# Patient Record
Sex: MALE | Race: BLACK OR AFRICAN AMERICAN | NOT HISPANIC OR LATINO | Employment: UNEMPLOYED | URBAN - METROPOLITAN AREA
[De-identification: names, ages, dates, MRNs, and addresses within clinical notes are randomized per-mention and may not be internally consistent; named-entity substitution may affect disease eponyms.]

---

## 2017-01-25 ENCOUNTER — TRANSCRIBE ORDERS (OUTPATIENT)
Dept: LAB | Facility: HOSPITAL | Age: 62
End: 2017-01-25

## 2017-02-07 ENCOUNTER — OFFICE VISIT (OUTPATIENT)
Dept: FAMILY MEDICINE CLINIC | Facility: CLINIC | Age: 62
End: 2017-02-07

## 2017-02-07 VITALS
HEART RATE: 71 BPM | SYSTOLIC BLOOD PRESSURE: 110 MMHG | TEMPERATURE: 98 F | BODY MASS INDEX: 26.22 KG/M2 | WEIGHT: 177 LBS | OXYGEN SATURATION: 98 % | HEIGHT: 69 IN | DIASTOLIC BLOOD PRESSURE: 60 MMHG

## 2017-02-07 DIAGNOSIS — J00 ACUTE NASOPHARYNGITIS: Primary | ICD-10-CM

## 2017-02-07 PROBLEM — R42 VERTIGO: Status: ACTIVE | Noted: 2017-02-07

## 2017-02-07 PROBLEM — N41.0 ACUTE PROSTATITIS: Status: ACTIVE | Noted: 2017-02-07

## 2017-02-07 PROBLEM — F52.21 ERECTILE DYSFUNCTION OF NONORGANIC ORIGIN: Status: ACTIVE | Noted: 2017-02-07

## 2017-02-07 PROBLEM — R07.9 CHEST PAIN: Status: ACTIVE | Noted: 2017-02-07

## 2017-02-07 PROBLEM — R13.10 DYSPHAGIA: Status: ACTIVE | Noted: 2017-02-07

## 2017-02-07 PROBLEM — I10 HYPERTENSION: Status: ACTIVE | Noted: 2017-02-07

## 2017-02-07 PROBLEM — M54.50 LOW BACK PAIN: Status: ACTIVE | Noted: 2017-02-07

## 2017-02-07 PROBLEM — H81.10 BENIGN PAROXYSMAL POSITIONAL VERTIGO: Status: ACTIVE | Noted: 2017-02-07

## 2017-02-07 PROBLEM — K21.9 GASTROESOPHAGEAL REFLUX DISEASE: Status: ACTIVE | Noted: 2017-02-07

## 2017-02-07 PROBLEM — H83.09 LABYRINTHITIS: Status: ACTIVE | Noted: 2017-02-07

## 2017-02-07 PROBLEM — B19.20 HEPATITIS C VIRUS INFECTION: Status: ACTIVE | Noted: 2017-02-07

## 2017-02-07 PROBLEM — R94.31 ABNORMAL ELECTROCARDIOGRAM: Status: ACTIVE | Noted: 2017-02-07

## 2017-02-07 PROBLEM — N40.0 BENIGN PROSTATIC HYPERTROPHY WITHOUT URINARY OBSTRUCTION: Status: ACTIVE | Noted: 2017-02-07

## 2017-02-07 LAB
EXPIRATION DATE: NORMAL
FLUAV AG NPH QL: NORMAL
FLUBV AG NPH QL: NORMAL
INTERNAL CONTROL: NORMAL
Lab: NORMAL

## 2017-02-07 PROCEDURE — 87804 INFLUENZA ASSAY W/OPTIC: CPT | Performed by: NURSE PRACTITIONER

## 2017-02-07 PROCEDURE — 99213 OFFICE O/P EST LOW 20 MIN: CPT | Performed by: NURSE PRACTITIONER

## 2017-02-07 RX ORDER — AZITHROMYCIN 250 MG/1
TABLET, FILM COATED ORAL
Qty: 6 TABLET | Refills: 0 | Status: SHIPPED | OUTPATIENT
Start: 2017-02-07 | End: 2017-03-08

## 2017-02-07 NOTE — PROGRESS NOTES
Subjective   Mp Wen is a 61 y.o. male.     History of Present Illness Pt presents with two days history of sinus drainage and fever 100.8, body aches, sneezing  and chills . Denies cough, congestion, SOB, abdominal pain, or chest pain. Pt states people at work and at [employer] sick. Pt took sinus medication with no relief. Pt had the flu vacc in Nov 2016.     The following portions of the patient's history were reviewed and updated as appropriate: allergies, current medications, past family history, past medical history, past social history, past surgical history and problem list.    Review of Systems   Constitutional: Positive for chills and fever. Negative for fatigue and unexpected weight change.   HENT: Positive for rhinorrhea and sneezing. Negative for congestion, hearing loss, nosebleeds, sore throat, trouble swallowing and voice change.    Eyes: Positive for discharge. Negative for pain, redness and visual disturbance.   Respiratory: Negative for cough, chest tightness, shortness of breath and wheezing.    Cardiovascular: Negative for chest pain, palpitations and leg swelling.   Gastrointestinal: Negative for abdominal distention, abdominal pain, anal bleeding, blood in stool, constipation, diarrhea, nausea and vomiting.   Endocrine: Negative for cold intolerance, heat intolerance, polydipsia, polyphagia and polyuria.   Genitourinary: Negative for dysuria, flank pain, frequency and hematuria.   Musculoskeletal: Positive for myalgias. Negative for arthralgias, gait problem and joint swelling.   Skin: Negative for color change and rash.   Neurological: Positive for headaches. Negative for dizziness, tremors, seizures, syncope, speech difficulty, weakness and numbness.   Hematological: Negative.    Psychiatric/Behavioral: Negative.        Objective   Physical Exam   Constitutional: He is oriented to person, place, and time. He appears well-developed and well-nourished. No distress.   HENT:   Head:  Normocephalic and atraumatic.   Right Ear: External ear normal.   Left Ear: External ear normal.   Nose: Nose normal.   Mouth/Throat: Oropharynx is clear and moist. No oropharyngeal exudate.   Eyes: Conjunctivae are normal. Right eye exhibits no discharge. Left eye exhibits no discharge. No scleral icterus.   Neck: Normal range of motion. Neck supple.   Cardiovascular: Normal rate, regular rhythm and normal heart sounds.  Exam reveals no gallop and no friction rub.    No murmur heard.  Pulmonary/Chest: Effort normal and breath sounds normal. No respiratory distress. He has no wheezes. He has no rales.   Musculoskeletal: Normal range of motion. He exhibits no edema or deformity.   Lymphadenopathy:     He has no cervical adenopathy.   Neurological: He is alert and oriented to person, place, and time. He displays normal reflexes. No cranial nerve deficit. Coordination normal.   Skin: Skin is warm and dry. No rash noted.   Psychiatric: He has a normal mood and affect. His behavior is normal. Judgment and thought content normal.   Vitals reviewed.      Assessment/Plan   Mp was seen today for flu symptoms.    Diagnoses and all orders for this visit:    Acute nasopharyngitis  -     azithromycin (ZITHROMAX Z-PIERRE) 250 MG tablet; Take 2 tablets the first day, then 1 tablet daily for 4 days.  -     POC Influenza A / B      Symptomatic treatment. Try OTC antihistamine like Claritin or Zyrtec. Follow up symptoms persist or worsen.

## 2017-03-08 ENCOUNTER — OFFICE VISIT (OUTPATIENT)
Dept: FAMILY MEDICINE CLINIC | Facility: CLINIC | Age: 62
End: 2017-03-08

## 2017-03-08 VITALS
HEIGHT: 69 IN | SYSTOLIC BLOOD PRESSURE: 122 MMHG | HEART RATE: 109 BPM | WEIGHT: 180 LBS | BODY MASS INDEX: 26.66 KG/M2 | OXYGEN SATURATION: 95 % | TEMPERATURE: 100.9 F | DIASTOLIC BLOOD PRESSURE: 80 MMHG

## 2017-03-08 DIAGNOSIS — R60.0 EDEMA OF LEFT LOWER EXTREMITY: ICD-10-CM

## 2017-03-08 DIAGNOSIS — M79.605 LEG PAIN, LEFT: Primary | ICD-10-CM

## 2017-03-08 LAB
BASOPHILS # BLD AUTO: 0.03 10*3/MM3 (ref 0–0.2)
BASOPHILS NFR BLD AUTO: 0.3 % (ref 0–1)
DEPRECATED RDW RBC AUTO: 45.6 FL (ref 37–54)
EOSINOPHIL # BLD AUTO: 0.05 10*3/MM3 (ref 0.1–0.3)
EOSINOPHIL NFR BLD AUTO: 0.5 % (ref 0–3)
ERYTHROCYTE [DISTWIDTH] IN BLOOD BY AUTOMATED COUNT: 13.6 % (ref 11.3–14.5)
HCT VFR BLD AUTO: 40.5 % (ref 38.9–50.9)
HGB BLD-MCNC: 13.5 G/DL (ref 13.1–17.5)
IMM GRANULOCYTES # BLD: 0.02 10*3/MM3 (ref 0–0.03)
IMM GRANULOCYTES NFR BLD: 0.2 % (ref 0–0.6)
LYMPHOCYTES # BLD AUTO: 1.09 10*3/MM3 (ref 0.6–4.8)
LYMPHOCYTES NFR BLD AUTO: 10.6 % (ref 24–44)
MCH RBC QN AUTO: 30.5 PG (ref 27–31)
MCHC RBC AUTO-ENTMCNC: 33.3 G/DL (ref 32–36)
MCV RBC AUTO: 91.4 FL (ref 80–99)
MONOCYTES # BLD AUTO: 0.95 10*3/MM3 (ref 0–1)
MONOCYTES NFR BLD AUTO: 9.3 % (ref 0–12)
NEUTROPHILS # BLD AUTO: 8.12 10*3/MM3 (ref 1.5–8.3)
NEUTROPHILS NFR BLD AUTO: 79.1 % (ref 41–71)
PLATELET # BLD AUTO: 198 10*3/MM3 (ref 150–450)
PMV BLD AUTO: 10.7 FL (ref 6–12)
RBC # BLD AUTO: 4.43 10*6/MM3 (ref 4.2–5.76)
URATE SERPL-MCNC: 6.6 MG/DL (ref 3.7–9.2)
WBC NRBC COR # BLD: 10.26 10*3/MM3 (ref 3.5–10.8)

## 2017-03-08 PROCEDURE — 84550 ASSAY OF BLOOD/URIC ACID: CPT | Performed by: FAMILY MEDICINE

## 2017-03-08 PROCEDURE — 85025 COMPLETE CBC W/AUTO DIFF WBC: CPT | Performed by: FAMILY MEDICINE

## 2017-03-08 PROCEDURE — 36415 COLL VENOUS BLD VENIPUNCTURE: CPT | Performed by: FAMILY MEDICINE

## 2017-03-08 PROCEDURE — 99213 OFFICE O/P EST LOW 20 MIN: CPT | Performed by: FAMILY MEDICINE

## 2017-03-08 RX ORDER — AMOXICILLIN AND CLAVULANATE POTASSIUM 875; 125 MG/1; MG/1
1 TABLET, FILM COATED ORAL 2 TIMES DAILY
Qty: 14 TABLET | Refills: 0 | Status: SHIPPED | OUTPATIENT
Start: 2017-03-08 | End: 2017-03-10

## 2017-03-08 NOTE — PROGRESS NOTES
"Subjective   Mp Wen is a 62 y.o. male.     History of Present Illness   The patient presents today with severe pain and swelling of his left foot.    He states today he also noticed a knot/lump in the left upper thigh/groin.  No known injury.    Also temperature today of 101.9 at home and 100.9 here in the office on recheck.    Prior hx of MRSA infection of right leg.  Also with prior hx of hepatitis C, treated with harvoni.      The following portions of the patient's history were reviewed and updated as appropriate: allergies, current medications, past social history and problem list.    Review of Systems   Musculoskeletal:        Left foot pain and swelling.  And left groin pain with painful knot,         Objective   Visit Vitals   • /80   • Pulse 109   • Temp (!) 100.9 °F (38.3 °C)   • Ht 69\" (175.3 cm)   • Wt 180 lb (81.6 kg)   • SpO2 95%   • BMI 26.58 kg/m2     Physical Exam   Constitutional: He appears well-developed and well-nourished.   Cardiovascular:   No murmur heard.  Pulmonary/Chest: He has no wheezes. He has no rales.   Musculoskeletal: He exhibits edema.   Tender at dorsum of left foot.  Ankle and toes nontender.    Small, mildly tender node at left groin.    Left calf and shin with 1 plus edema.  Mild tenderness of left calf.   Skin:   Small area of erythema at center of dorsum of left foot.  No induration. No vesicles.   Nursing note and vitals reviewed.      Assessment/Plan   Problem List Items Addressed This Visit     None      Visit Diagnoses     Leg pain, left    -  Primary    Edema of left lower extremity                  Rest and prop foot up as much as possible.    Rx for Augmentin 875 mg twice a day for 7 days.    Sample for Xarelto 15 mg to take one tablet twice a day for 2 days.    Check a venous duplex scan of the left leg.  Check a CBC and Uric acid level.    Follow up on Friday.      Scribed for Dr Denny Howard by Edith Javed CMA.    I, Denny Howard MD, personally " performed the services described in this documentation, as scribed by Edith Javed in my presence, and is both accurate and complete.

## 2017-03-10 ENCOUNTER — OFFICE VISIT (OUTPATIENT)
Dept: FAMILY MEDICINE CLINIC | Facility: CLINIC | Age: 62
End: 2017-03-10

## 2017-03-10 VITALS
TEMPERATURE: 98.6 F | WEIGHT: 175 LBS | HEIGHT: 69 IN | HEART RATE: 104 BPM | BODY MASS INDEX: 25.92 KG/M2 | SYSTOLIC BLOOD PRESSURE: 110 MMHG | OXYGEN SATURATION: 97 % | DIASTOLIC BLOOD PRESSURE: 78 MMHG

## 2017-03-10 DIAGNOSIS — L03.116 CELLULITIS OF LEFT FOOT: Primary | ICD-10-CM

## 2017-03-10 PROCEDURE — 96372 THER/PROPH/DIAG INJ SC/IM: CPT | Performed by: FAMILY MEDICINE

## 2017-03-10 PROCEDURE — 99214 OFFICE O/P EST MOD 30 MIN: CPT | Performed by: FAMILY MEDICINE

## 2017-03-10 RX ORDER — CEFTRIAXONE 500 MG/1
500 INJECTION, POWDER, FOR SOLUTION INTRAMUSCULAR; INTRAVENOUS EVERY 24 HOURS
Status: DISCONTINUED | OUTPATIENT
Start: 2017-03-10 | End: 2017-03-13

## 2017-03-10 RX ORDER — SULFAMETHOXAZOLE AND TRIMETHOPRIM 800; 160 MG/1; MG/1
1 TABLET ORAL 2 TIMES DAILY
Qty: 14 TABLET | Refills: 0 | Status: SHIPPED | OUTPATIENT
Start: 2017-03-10 | End: 2017-03-16 | Stop reason: SDUPTHER

## 2017-03-10 RX ADMIN — CEFTRIAXONE 500 MG: 500 INJECTION, POWDER, FOR SOLUTION INTRAMUSCULAR; INTRAVENOUS at 15:19

## 2017-03-10 NOTE — PROGRESS NOTES
"Subjective   Mp Wen is a 62 y.o. male.     History of Present Illness   The patient is here for a follow up on left foot and leg pain and swelling. Venous duplex scan of the left lower extremity revealed no deep vein thrombosis. Laboratory studies were unremarkable.    He states the pain and swelling is worse and now having more redness.    States he is unable to bear weight on it.    The following portions of the patient's history were reviewed and updated as appropriate: allergies, current medications, past social history and problem list.    Review of Systems   Constitutional: Positive for chills and fever.   HENT: Negative for congestion.    Eyes: Negative for visual disturbance.   Respiratory: Negative for cough and shortness of breath.    Cardiovascular: Negative for chest pain, palpitations and leg swelling.   Gastrointestinal: Negative for abdominal pain.   Musculoskeletal: Positive for gait problem. Negative for back pain.   Skin: Positive for color change.   Hematological: Does not bruise/bleed easily.       Objective   Visit Vitals   • /78   • Pulse 104   • Temp 98.6 °F (37 °C)   • Ht 69\" (175.3 cm)   • Wt 175 lb (79.4 kg)   • SpO2 97%   • BMI 25.84 kg/m2     Physical Exam   Constitutional: He appears well-developed and well-nourished.   Eyes: Pupils are equal, round, and reactive to light.   Cardiovascular: Normal rate, regular rhythm and normal heart sounds.    Pulmonary/Chest: Effort normal and breath sounds normal. He has no wheezes. He has no rales.   Skin: Rash noted. There is erythema.   Examination of the left foot reveals a.m. wide distribution of erythema and tenderness of the skin at the dorsum of the foot. There is no involvement of the sole. The ankle joint is nontender and the distal aspect of the toes themselves are nontender. The central portion of the mid foot, however, is very states tender at its midportion. The patient has pain with weightbearing. There is no tenderness of " the ankle and none of the knee. There is a small node palpable. The left groin which is tender.   Nursing note and vitals reviewed.      Assessment/Plan   Problem List Items Addressed This Visit     None      Visit Diagnoses     Cellulitis of left foot    -  Primary              Prop foot up as much as possible.    Stop the Augmentin. Stop the Xarelto.    Rx for Bactrim DS twice a day for 7 days.  Rx for Dicloxacillin 500 mg 4 times a day #28+0.    Rx for Hydrocodone 7.5/325 mg 4 times a day as needed #20+0.    Injection of Ceftriaxone 500 mg IM today.    Refer to Infectious disease.    Follow up on Monday    Go to the ER if foot swelling and redness worsens.      Scribed for Dr Denny Howard by Edith Javed CMA.    I, Denny Howard MD, personally performed the services described in this documentation, as scribed by Edith Javed in my presence, and is both accurate and complete.

## 2017-03-13 ENCOUNTER — OFFICE VISIT (OUTPATIENT)
Dept: FAMILY MEDICINE CLINIC | Facility: CLINIC | Age: 62
End: 2017-03-13

## 2017-03-13 ENCOUNTER — HOSPITAL ENCOUNTER (OUTPATIENT)
Dept: GENERAL RADIOLOGY | Facility: HOSPITAL | Age: 62
Discharge: HOME | End: 2017-03-13
Attending: FAMILY MEDICINE | Admitting: FAMILY MEDICINE

## 2017-03-13 VITALS
SYSTOLIC BLOOD PRESSURE: 102 MMHG | HEART RATE: 82 BPM | TEMPERATURE: 98.2 F | DIASTOLIC BLOOD PRESSURE: 68 MMHG | BODY MASS INDEX: 26.07 KG/M2 | OXYGEN SATURATION: 98 % | WEIGHT: 176 LBS | HEIGHT: 69 IN

## 2017-03-13 DIAGNOSIS — L03.119 CELLULITIS OF FOOT: Primary | ICD-10-CM

## 2017-03-13 DIAGNOSIS — L03.119 CELLULITIS OF FOOT: ICD-10-CM

## 2017-03-13 PROBLEM — H81.10 BENIGN PAROXYSMAL POSITIONAL VERTIGO: Status: RESOLVED | Noted: 2017-02-07 | Resolved: 2017-03-13

## 2017-03-13 PROBLEM — R42 VERTIGO: Status: RESOLVED | Noted: 2017-02-07 | Resolved: 2017-03-13

## 2017-03-13 PROBLEM — R07.9 CHEST PAIN: Status: RESOLVED | Noted: 2017-02-07 | Resolved: 2017-03-13

## 2017-03-13 PROBLEM — N41.0 ACUTE PROSTATITIS: Status: RESOLVED | Noted: 2017-02-07 | Resolved: 2017-03-13

## 2017-03-13 PROBLEM — H83.09 LABYRINTHITIS: Status: RESOLVED | Noted: 2017-02-07 | Resolved: 2017-03-13

## 2017-03-13 PROCEDURE — 99213 OFFICE O/P EST LOW 20 MIN: CPT | Performed by: FAMILY MEDICINE

## 2017-03-13 PROCEDURE — 73630 X-RAY EXAM OF FOOT: CPT

## 2017-03-13 NOTE — PROGRESS NOTES
"Subjective   Mp Wen is a 62 y.o. male.     History of Present Illness   The patient is here for a follow up on cellulitis of his left foot.    He states he is doing better.  Still sore but not as bad. Able to put weight on foot now.        The following portions of the patient's history were reviewed and updated as appropriate: allergies, current medications, past social history and problem list.    Review of Systems   Constitutional: Positive for appetite change (decreased).   Gastrointestinal: Positive for nausea.   Musculoskeletal:        Pain of left foot         Objective   Visit Vitals   • /68   • Pulse 82   • Temp 98.2 °F (36.8 °C)   • Ht 69\" (175.3 cm)   • Wt 176 lb (79.8 kg)   • SpO2 98%   • BMI 25.99 kg/m2     Physical Exam   Constitutional: He appears well-developed and well-nourished.   Cardiovascular: Normal rate, regular rhythm and normal heart sounds.    Pulmonary/Chest: Effort normal and breath sounds normal.   Skin:   The area of redness at the dorsum of the foot has diminished in distribution and the tenderness is decreased in intensity. There is a small area centrally at the dorsum of the foot that is still mildly tender.   Nursing note and vitals reviewed.      Assessment/Plan   Problem List Items Addressed This Visit     None      Visit Diagnoses     Cellulitis of foot    -  Primary    left                Finish out the Dicloxacillin and Bactrim.    Use Yogurt or cottage cheese once or twice a day or use OTC probiotic.    Check an x-ray of the left foot.    Follow up on Thursday.    Scribed for Dr Denny Howard by Edith Javed CMA.    I, Denny Howard MD, personally performed the services described in this documentation, as scribed by Edith Javed in my presence, and is both accurate and complete.        "

## 2017-03-16 ENCOUNTER — OFFICE VISIT (OUTPATIENT)
Dept: FAMILY MEDICINE CLINIC | Facility: CLINIC | Age: 62
End: 2017-03-16

## 2017-03-16 VITALS
DIASTOLIC BLOOD PRESSURE: 70 MMHG | SYSTOLIC BLOOD PRESSURE: 108 MMHG | WEIGHT: 179 LBS | BODY MASS INDEX: 26.51 KG/M2 | OXYGEN SATURATION: 96 % | HEIGHT: 69 IN | TEMPERATURE: 97.6 F | HEART RATE: 71 BPM

## 2017-03-16 DIAGNOSIS — L03.116 CELLULITIS OF LEFT FOOT: ICD-10-CM

## 2017-03-16 PROCEDURE — 99213 OFFICE O/P EST LOW 20 MIN: CPT | Performed by: FAMILY MEDICINE

## 2017-03-16 RX ORDER — SULFAMETHOXAZOLE AND TRIMETHOPRIM 800; 160 MG/1; MG/1
1 TABLET ORAL 2 TIMES DAILY
Qty: 14 TABLET | Refills: 0 | Status: SHIPPED | OUTPATIENT
Start: 2017-03-16 | End: 2017-08-23

## 2017-03-16 NOTE — PROGRESS NOTES
"Subjective   Mp Wen is a 62 y.o. male.     History of Present Illness    The patient is here for a follow up on  left foot.    He states he is doing much better  Still some redness and tenderness but much improved.          The following portions of the patient's history were reviewed and updated as appropriate: allergies, current medications, past social history and problem list.    Review of Systems   Gastrointestinal: Positive for diarrhea (occasional).   Musculoskeletal:        Left foot pain         Objective   Visit Vitals   • /70   • Pulse 71   • Temp 97.6 °F (36.4 °C)   • Ht 69\" (175.3 cm)   • Wt 179 lb (81.2 kg)   • SpO2 96%   • BMI 26.43 kg/m2     Physical Exam   Constitutional: He appears well-developed and well-nourished.   Cardiovascular: Normal rate, regular rhythm and normal heart sounds.    Pulmonary/Chest: Effort normal and breath sounds normal.   Skin:   Exam of foot reveals no erythema and no tenderness.   Nursing note and vitals reviewed.      Assessment/Plan   Problem List Items Addressed This Visit     None      Visit Diagnoses     Cellulitis of left foot                  Drink plenty fluids.    Rx for 7 more days on Bactrim DS #14+0.    Follow up as needed.    Scribed for Dr Denny Howard by Edith Javed CMA.    I, Denny Howard MD, personally performed the services described in this documentation, as scribed by Edith Javed in my presence, and is both accurate and complete.    "

## 2017-04-25 RX ORDER — LISINOPRIL 20 MG/1
TABLET ORAL
Qty: 90 TABLET | Refills: 0 | Status: SHIPPED | OUTPATIENT
Start: 2017-04-25 | End: 2017-07-26 | Stop reason: SDUPTHER

## 2017-07-26 RX ORDER — LISINOPRIL 20 MG/1
TABLET ORAL
Qty: 90 TABLET | Refills: 0 | Status: SHIPPED | OUTPATIENT
Start: 2017-07-26 | End: 2017-10-21 | Stop reason: SDUPTHER

## 2017-08-23 ENCOUNTER — OFFICE VISIT (OUTPATIENT)
Dept: FAMILY MEDICINE CLINIC | Facility: CLINIC | Age: 62
End: 2017-08-23

## 2017-08-23 VITALS
HEART RATE: 85 BPM | BODY MASS INDEX: 26.22 KG/M2 | HEIGHT: 69 IN | WEIGHT: 177 LBS | SYSTOLIC BLOOD PRESSURE: 128 MMHG | DIASTOLIC BLOOD PRESSURE: 70 MMHG | OXYGEN SATURATION: 95 % | TEMPERATURE: 99.8 F

## 2017-08-23 DIAGNOSIS — K76.0 HEPATIC STEATOSIS: Primary | ICD-10-CM

## 2017-08-23 PROCEDURE — 99213 OFFICE O/P EST LOW 20 MIN: CPT | Performed by: FAMILY MEDICINE

## 2017-08-23 NOTE — PROGRESS NOTES
"Subjective   Mp Wen is a 62 y.o. male.     History of Present Illness   The patient is here for a follow up on episodic vertigo and fill out FMLA forms.  Also a follow up on elevated liver function studies.    States he saw Dr Guzmán two weeks ago and she did repeat lab studies.    States he is doing well.  Denies any chest pain or shortness of breath.    The following portions of the patient's history were reviewed and updated as appropriate: allergies, current medications, past social history and problem list.    Review of Systems    Objective   /70  Pulse 85  Temp 99.8 °F (37.7 °C)  Ht 69\" (175.3 cm)  Wt 177 lb (80.3 kg)  SpO2 95%  BMI 26.14 kg/m2  Physical Exam   Constitutional: He appears well-developed and well-nourished.   Cardiovascular: Normal rate, regular rhythm and normal heart sounds.    Pulmonary/Chest: Effort normal and breath sounds normal. He has no wheezes. He has no rales.   Abdominal: Soft. Bowel sounds are normal. He exhibits no distension and no mass. There is no tenderness.   Nursing note and vitals reviewed.      Assessment/Plan   Problem List Items Addressed This Visit     None      Visit Diagnoses     Hepatic steatosis    -  Primary              Drink plenty fluids.    Continue medications as doing.    Follow up in 5 months.  Sooner if needed.                  Scribed for Dr Denny Howard by Edith Javed CMA.          I, Denny Howard MD, personally performed the services described in this documentation, as scribed by Edith Javed in my presence, and is both accurate and complete.      "

## 2017-10-23 RX ORDER — LISINOPRIL 20 MG/1
TABLET ORAL
Qty: 90 TABLET | Refills: 0 | Status: SHIPPED | OUTPATIENT
Start: 2017-10-23 | End: 2018-02-21 | Stop reason: SDUPTHER

## 2017-10-30 RX ORDER — LISINOPRIL 20 MG/1
TABLET ORAL
Qty: 90 TABLET | Refills: 0 | Status: SHIPPED | OUTPATIENT
Start: 2017-10-30 | End: 2018-05-05 | Stop reason: SDUPTHER

## 2018-01-22 ENCOUNTER — TELEPHONE (OUTPATIENT)
Dept: FAMILY MEDICINE CLINIC | Facility: CLINIC | Age: 63
End: 2018-01-22

## 2018-01-22 RX ORDER — SILDENAFIL 100 MG/1
50-100 TABLET, FILM COATED ORAL AS NEEDED
Qty: 10 TABLET | Refills: 5 | Status: SHIPPED | OUTPATIENT
Start: 2018-01-22 | End: 2018-02-21 | Stop reason: SDUPTHER

## 2018-02-21 ENCOUNTER — OFFICE VISIT (OUTPATIENT)
Dept: FAMILY MEDICINE CLINIC | Facility: CLINIC | Age: 63
End: 2018-02-21

## 2018-02-21 VITALS
HEIGHT: 69 IN | BODY MASS INDEX: 27.4 KG/M2 | OXYGEN SATURATION: 98 % | WEIGHT: 185 LBS | RESPIRATION RATE: 16 BRPM | HEART RATE: 89 BPM | TEMPERATURE: 98.6 F | SYSTOLIC BLOOD PRESSURE: 120 MMHG | DIASTOLIC BLOOD PRESSURE: 80 MMHG

## 2018-02-21 DIAGNOSIS — Z12.5 PROSTATE CANCER SCREENING: ICD-10-CM

## 2018-02-21 DIAGNOSIS — M54.40 CHRONIC LOW BACK PAIN WITH SCIATICA, SCIATICA LATERALITY UNSPECIFIED, UNSPECIFIED BACK PAIN LATERALITY: ICD-10-CM

## 2018-02-21 DIAGNOSIS — G89.29 CHRONIC LOW BACK PAIN WITH SCIATICA, SCIATICA LATERALITY UNSPECIFIED, UNSPECIFIED BACK PAIN LATERALITY: ICD-10-CM

## 2018-02-21 DIAGNOSIS — M79.604 PAIN OF RIGHT LOWER EXTREMITY: ICD-10-CM

## 2018-02-21 DIAGNOSIS — Z23 IMMUNIZATION DUE: ICD-10-CM

## 2018-02-21 DIAGNOSIS — Z00.00 HEALTH CARE MAINTENANCE: Primary | ICD-10-CM

## 2018-02-21 PROCEDURE — 90715 TDAP VACCINE 7 YRS/> IM: CPT | Performed by: NURSE PRACTITIONER

## 2018-02-21 PROCEDURE — 90471 IMMUNIZATION ADMIN: CPT | Performed by: NURSE PRACTITIONER

## 2018-02-21 PROCEDURE — 99396 PREV VISIT EST AGE 40-64: CPT | Performed by: NURSE PRACTITIONER

## 2018-02-21 RX ORDER — CYCLOBENZAPRINE HCL 10 MG
10 TABLET ORAL 3 TIMES DAILY PRN
Qty: 60 TABLET | Refills: 1 | Status: SHIPPED | OUTPATIENT
Start: 2018-02-21 | End: 2018-05-23

## 2018-02-21 NOTE — PROGRESS NOTES
Patient is here for annual wellness exam.    Concerns today complains of RLE pain off and on for 3 months. Pain is daily and feels deep below the muscle. It is not positional. Seems to be worse at night. Pain is throbbing in nature and can last up to 30 min at a time. Treated with topical creams with lidocaine without relief. No known injury and new shoes did not help. Pain can be in right lateral calf and also into thigh and right buttock. Pain is not in all three locations at once. It is worse with the weather.    Last health maintenance visit was 4 years . Overall they feel their health is good . 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 Patient's diet is likes to snack but has a varied diet. Exercises regularly not at all . Tobacco use Former smoker. Alcohol use is none . Illicit drug no history of illicit drug use     Screening Tests  Vision Impairment. Uses Glasses/Contacts and Eye Exam Up To Date   Hearingnormal  Dental: Brushes does teeth twice a day . Dental exam every six months?no Has full dentures  Colonoscopy yes  Prostate Exam yes          Immunization History  Tdap? yes  HPV? not applicable  Pneumonia? no  Shingles? yes    The following portions of the patient's history were reviewed and updated as appropriate: allergies, current medications, past family history, past medical history, past social history, past surgical history and problem list.    Past Medical History:   Diagnosis Date   • Abnormal EKG    • Acute bacterial prostatitis    • Benign prostatic hypertrophy    • ED (erectile dysfunction) of non-organic origin    • Esophageal reflux    • Hepatitis C    • Hypertension    • Labyrinthitis    • Retinal detachment    • Retinal detachment    • Vertigo        Family History   Problem Relation Age of Onset   • Coronary artery disease Mother    • Prostate cancer Father    • No Known Problems Paternal Grandmother        Past Surgical History:   Procedure Laterality Date   • HERNIA REPAIR          Social History     Social History   • Marital status:      Spouse name: N/A   • Number of children: N/A   • Years of education: N/A     Occupational History   • [employer]       Social History Main Topics   • Smoking status: Former Smoker     Types: Cigarettes   • Smokeless tobacco: Never Used   • Alcohol use No   • Drug use: No   • Sexual activity: Yes     Partners: Female     Other Topics Concern   • Not on file     Social History Narrative    Lives with significant other        Review of Systems  Do you have pain that bothers you in your daily life? yes  Review of Systems   Constitutional: Negative for fatigue, fever and unexpected weight change.   HENT: Negative for congestion, hearing loss, nosebleeds, rhinorrhea, sore throat, trouble swallowing and voice change.    Eyes: Negative for pain, discharge, redness and visual disturbance.   Respiratory: Negative for cough, chest tightness, shortness of breath and wheezing.    Cardiovascular: Negative for chest pain, palpitations and leg swelling.   Gastrointestinal: Negative for abdominal distention, abdominal pain, anal bleeding, blood in stool, constipation, diarrhea, nausea and vomiting.   Endocrine: Negative for cold intolerance, heat intolerance, polydipsia, polyphagia and polyuria.   Genitourinary: Negative for dysuria, flank pain, frequency and hematuria.   Musculoskeletal: Positive for myalgias. Negative for arthralgias, gait problem and joint swelling.   Skin: Negative for color change and rash.   Neurological: Negative for dizziness, tremors, seizures, syncope, speech difficulty, weakness, numbness and headaches.   Hematological: Negative.    Psychiatric/Behavioral: Negative.        Objective   Physical Exam   Constitutional: He is oriented to person, place, and time. He appears well-developed and well-nourished. No distress.   HENT:   Head: Normocephalic and atraumatic.   Right Ear: External ear normal.   Left Ear: External ear normal.   Nose: Nose  normal.   Mouth/Throat: Oropharynx is clear and moist. No oropharyngeal exudate.   Eyes: Conjunctivae are normal. Right eye exhibits no discharge. Left eye exhibits no discharge. No scleral icterus.   Neck: Normal range of motion. Neck supple. No thyromegaly present.   Cardiovascular: Normal rate, regular rhythm, normal heart sounds and intact distal pulses.  Exam reveals no gallop and no friction rub.    No murmur heard.  Pulmonary/Chest: Effort normal and breath sounds normal. No respiratory distress. He has no wheezes. He has no rales.   Abdominal: Soft. Bowel sounds are normal. He exhibits no distension and no mass. There is no tenderness. There is no rebound and no guarding.   Musculoskeletal: Normal range of motion. He exhibits no edema, tenderness or deformity.    +2 patellar DTR bilat. Strong dorsiflexion of the toes bilat. Toe/heel gait is intact. Pain with palpation of  right sciatic notch. Spams of paraspinous muscles appreciated on the right areas     Lymphadenopathy:     He has no cervical adenopathy.   Neurological: He is alert and oriented to person, place, and time. He has normal reflexes. He displays normal reflexes. No cranial nerve deficit. Coordination normal.   Skin: Skin is warm and dry. No rash noted.   Psychiatric: He has a normal mood and affect. His behavior is normal. Judgment and thought content normal.   Vitals reviewed.       Mp was seen today for annual exam and leg pain.    Diagnoses and all orders for this visit:    Health care maintenance  -     CBC & Differential; Future  -     Comprehensive Metabolic Panel; Future  -     Lipid Panel; Future  -     T4, Free; Future  -     TSH; Future  -     Vitamin D 25 Hydroxy; Future    Immunization due  -     Tdap Vaccine Greater Than or Equal To 6yo IM    Pain of right lower extremity  -     FREDERICK With / DsDNA, RNP, Sjogrens A / B, Smith; Future  -     C-reactive Protein; Future  -     Rheumatoid Factor; Future  -     Sedimentation Rate;  Future  -     Uric Acid; Future    Chronic low back pain with sciatica, sciatica laterality unspecified, unspecified back pain laterality  -     diclofenac (VOLTAREN) 50 MG EC tablet; Take 1 tablet by mouth 2 (Two) Times a Day.    Prostate cancer screening  -     PSA Screen; Future    Other orders  -     cyclobenzaprine (FLEXERIL) 10 MG tablet; Take 1 tablet by mouth 3 (Three) Times a Day As Needed for Muscle Spasms.      1. VIS provided.  2. Patient Counseling:  --Nutrition: Stressed importance of moderation in sodium/caffeine intake, saturated fat and cholesterol, caloric balance, sufficient intake of fresh fruits, vegetables, fiber, calcium, iron, and 1 mg of folate supplement per day (for females capable of pregnancy).  --Exercise: Stressed the importance of regular exercise.   --Injury prevention: Discussed safety belts, safety helmets, smoke detector, smoking near bedding or upholstery.   --Dental health: Discussed importance of regular tooth brushing, flossing, and dental visits.  --Immunizations reviewed.  --Discussed benefits of screening colonoscopy.  --After hours service discussed with patient    3. Discussed the patient's BMI with him.  The BMI is above average; BMI management plan is completed  4. Follow up next physical in 1 year  5. Discussed the nature of the disease including, risks, complications, implications, management, safe and proper use of medications. Encouraged therapeutic lifestyle changes including low calorie diet with plenty of fruits and vegetables, daily exercise, medication compliance, and keeping scheduled follow up appointments with me and any other providers. Encouraged patient to have appointment for complete physical, fasting labs, appropriate screenings, and immunizations on an annual basis.  6. Return in 2 weeks. He may need x-ray, PT and steroids. Consider RLS.  7. Caution GI upset with nsaids. No driving with flexeril.

## 2018-02-23 ENCOUNTER — LAB (OUTPATIENT)
Dept: FAMILY MEDICINE CLINIC | Facility: CLINIC | Age: 63
End: 2018-02-23

## 2018-02-23 DIAGNOSIS — Z00.00 HEALTH CARE MAINTENANCE: ICD-10-CM

## 2018-02-23 DIAGNOSIS — M79.604 PAIN OF RIGHT LOWER EXTREMITY: ICD-10-CM

## 2018-02-23 DIAGNOSIS — Z12.5 PROSTATE CANCER SCREENING: ICD-10-CM

## 2018-02-23 LAB
25(OH)D3 SERPL-MCNC: 8.9 NG/ML
ALBUMIN SERPL-MCNC: 4.4 G/DL (ref 3.2–4.8)
ALBUMIN/GLOB SERPL: 1.3 G/DL (ref 1.5–2.5)
ALP SERPL-CCNC: 75 U/L (ref 25–100)
ALT SERPL W P-5'-P-CCNC: 23 U/L (ref 7–40)
ANION GAP SERPL CALCULATED.3IONS-SCNC: 5 MMOL/L (ref 3–11)
ARTICHOKE IGE QN: 199 MG/DL (ref 0–130)
AST SERPL-CCNC: 25 U/L (ref 0–33)
BASOPHILS # BLD AUTO: 0.04 10*3/MM3 (ref 0–0.2)
BASOPHILS NFR BLD AUTO: 0.8 % (ref 0–1)
BILIRUB SERPL-MCNC: 0.8 MG/DL (ref 0.3–1.2)
BUN BLD-MCNC: 12 MG/DL (ref 9–23)
BUN/CREAT SERPL: 12 (ref 7–25)
CALCIUM SPEC-SCNC: 9.3 MG/DL (ref 8.7–10.4)
CHLORIDE SERPL-SCNC: 103 MMOL/L (ref 99–109)
CHOLEST SERPL-MCNC: 248 MG/DL (ref 0–200)
CO2 SERPL-SCNC: 30 MMOL/L (ref 20–31)
CREAT BLD-MCNC: 1 MG/DL (ref 0.6–1.3)
CRP SERPL-MCNC: 0.04 MG/DL (ref 0–1)
DEPRECATED RDW RBC AUTO: 42.3 FL (ref 37–54)
EOSINOPHIL # BLD AUTO: 0.12 10*3/MM3 (ref 0–0.3)
EOSINOPHIL NFR BLD AUTO: 2.4 % (ref 0–3)
ERYTHROCYTE [DISTWIDTH] IN BLOOD BY AUTOMATED COUNT: 13 % (ref 11.3–14.5)
ERYTHROCYTE [SEDIMENTATION RATE] IN BLOOD: 24 MM/HR (ref 0–20)
GFR SERPL CREATININE-BSD FRML MDRD: 91 ML/MIN/1.73
GLOBULIN UR ELPH-MCNC: 3.3 GM/DL
GLUCOSE BLD-MCNC: 90 MG/DL (ref 70–100)
HCT VFR BLD AUTO: 41.5 % (ref 38.9–50.9)
HDLC SERPL-MCNC: 43 MG/DL (ref 40–60)
HGB BLD-MCNC: 13.7 G/DL (ref 13.1–17.5)
IMM GRANULOCYTES # BLD: 0 10*3/MM3 (ref 0–0.03)
IMM GRANULOCYTES NFR BLD: 0 % (ref 0–0.6)
LYMPHOCYTES # BLD AUTO: 2.91 10*3/MM3 (ref 0.6–4.8)
LYMPHOCYTES NFR BLD AUTO: 57.1 % (ref 24–44)
MCH RBC QN AUTO: 29.7 PG (ref 27–31)
MCHC RBC AUTO-ENTMCNC: 33 G/DL (ref 32–36)
MCV RBC AUTO: 90 FL (ref 80–99)
MONOCYTES # BLD AUTO: 0.49 10*3/MM3 (ref 0–1)
MONOCYTES NFR BLD AUTO: 9.6 % (ref 0–12)
NEUTROPHILS # BLD AUTO: 1.54 10*3/MM3 (ref 1.5–8.3)
NEUTROPHILS NFR BLD AUTO: 30.1 % (ref 41–71)
PLATELET # BLD AUTO: 205 10*3/MM3 (ref 150–450)
PMV BLD AUTO: 11 FL (ref 6–12)
POTASSIUM BLD-SCNC: 4.3 MMOL/L (ref 3.5–5.5)
PROT SERPL-MCNC: 7.7 G/DL (ref 5.7–8.2)
PSA SERPL-MCNC: 0.4 NG/ML (ref 0–4)
RBC # BLD AUTO: 4.61 10*6/MM3 (ref 4.2–5.76)
SODIUM BLD-SCNC: 138 MMOL/L (ref 132–146)
T4 FREE SERPL-MCNC: 0.89 NG/DL (ref 0.89–1.76)
TRIGL SERPL-MCNC: 165 MG/DL (ref 0–150)
TSH SERPL DL<=0.05 MIU/L-ACNC: 1.34 MIU/ML (ref 0.35–5.35)
URATE SERPL-MCNC: 7.7 MG/DL (ref 3.7–9.2)
WBC NRBC COR # BLD: 5.1 10*3/MM3 (ref 3.5–10.8)

## 2018-02-23 PROCEDURE — 84443 ASSAY THYROID STIM HORMONE: CPT | Performed by: NURSE PRACTITIONER

## 2018-02-23 PROCEDURE — 86038 ANTINUCLEAR ANTIBODIES: CPT | Performed by: NURSE PRACTITIONER

## 2018-02-23 PROCEDURE — 80061 LIPID PANEL: CPT | Performed by: NURSE PRACTITIONER

## 2018-02-23 PROCEDURE — 80053 COMPREHEN METABOLIC PANEL: CPT | Performed by: NURSE PRACTITIONER

## 2018-02-23 PROCEDURE — 85652 RBC SED RATE AUTOMATED: CPT | Performed by: NURSE PRACTITIONER

## 2018-02-23 PROCEDURE — G0103 PSA SCREENING: HCPCS | Performed by: NURSE PRACTITIONER

## 2018-02-23 PROCEDURE — 82306 VITAMIN D 25 HYDROXY: CPT | Performed by: NURSE PRACTITIONER

## 2018-02-23 PROCEDURE — 85025 COMPLETE CBC W/AUTO DIFF WBC: CPT | Performed by: NURSE PRACTITIONER

## 2018-02-23 PROCEDURE — 84550 ASSAY OF BLOOD/URIC ACID: CPT | Performed by: NURSE PRACTITIONER

## 2018-02-23 PROCEDURE — 86431 RHEUMATOID FACTOR QUANT: CPT | Performed by: NURSE PRACTITIONER

## 2018-02-23 PROCEDURE — 86140 C-REACTIVE PROTEIN: CPT | Performed by: NURSE PRACTITIONER

## 2018-02-23 PROCEDURE — 84439 ASSAY OF FREE THYROXINE: CPT | Performed by: NURSE PRACTITIONER

## 2018-02-26 ENCOUNTER — TELEPHONE (OUTPATIENT)
Dept: FAMILY MEDICINE CLINIC | Facility: CLINIC | Age: 63
End: 2018-02-26

## 2018-02-26 DIAGNOSIS — E55.9 VITAMIN D DEFICIENCY: ICD-10-CM

## 2018-02-26 DIAGNOSIS — E78.2 MIXED HYPERLIPIDEMIA: Primary | ICD-10-CM

## 2018-02-26 LAB
ANA SER QL: NEGATIVE
RHEUMATOID FACT SERPL-ACNC: NEGATIVE [IU]/ML

## 2018-02-26 RX ORDER — ERGOCALCIFEROL 1.25 MG/1
50000 CAPSULE ORAL WEEKLY
Qty: 4 CAPSULE | Refills: 2 | Status: SHIPPED | OUTPATIENT
Start: 2018-02-26 | End: 2018-05-13 | Stop reason: SDUPTHER

## 2018-02-26 RX ORDER — ATORVASTATIN CALCIUM 20 MG/1
20 TABLET, FILM COATED ORAL DAILY
Qty: 30 TABLET | Refills: 5 | Status: SHIPPED | OUTPATIENT
Start: 2018-02-26 | End: 2018-04-23 | Stop reason: SDUPTHER

## 2018-03-07 ENCOUNTER — OFFICE VISIT (OUTPATIENT)
Dept: FAMILY MEDICINE CLINIC | Facility: CLINIC | Age: 63
End: 2018-03-07

## 2018-03-07 VITALS
TEMPERATURE: 98.3 F | SYSTOLIC BLOOD PRESSURE: 132 MMHG | BODY MASS INDEX: 26.81 KG/M2 | DIASTOLIC BLOOD PRESSURE: 74 MMHG | WEIGHT: 181 LBS | HEIGHT: 69 IN | HEART RATE: 87 BPM | OXYGEN SATURATION: 97 %

## 2018-03-07 DIAGNOSIS — E78.00 PURE HYPERCHOLESTEROLEMIA: ICD-10-CM

## 2018-03-07 DIAGNOSIS — M54.31 SCIATICA OF RIGHT SIDE: Primary | ICD-10-CM

## 2018-03-07 PROCEDURE — 99213 OFFICE O/P EST LOW 20 MIN: CPT | Performed by: FAMILY MEDICINE

## 2018-03-07 RX ORDER — GABAPENTIN 300 MG/1
300 CAPSULE ORAL NIGHTLY
Qty: 30 CAPSULE | Refills: 2
Start: 2018-03-07 | End: 2018-05-23

## 2018-03-07 NOTE — PATIENT INSTRUCTIONS
"Fat and Cholesterol Restricted Diet  Getting too much fat and cholesterol in your diet may cause health problems. Following this diet helps keep your fat and cholesterol at normal levels. This can keep you from getting sick.  What types of fat should I choose?  · Choose monosaturated and polyunsaturated fats. These are found in foods such as olive oil, canola oil, flaxseeds, walnuts, almonds, and seeds.  · Eat more omega-3 fats. Good choices include salmon, mackerel, sardines, tuna, flaxseed oil, and ground flaxseeds.  · Limit saturated fats. These are in animal products such as meats, butter, and cream. They can also be in plant products such as palm oil, palm kernel oil, and coconut oil.  · Avoid foods with partially hydrogenated oils in them. These contain trans fats. Examples of foods that have trans fats are stick margarine, some tub margarines, cookies, crackers, and other baked goods.  What general guidelines do I need to follow?  · Check food labels. Look for the words \"trans fat\" and \"saturated fat.\"  · When preparing a meal:  ¨ Fill half of your plate with vegetables and green salads.  ¨ Fill one fourth of your plate with whole grains. Look for the word \"whole\" as the first word in the ingredient list.  ¨ Fill one fourth of your plate with lean protein foods.  · Eat more foods that have fiber, like apples, carrots, beans, peas, and barley.  · Eat more home-cooked foods. Eat less at restaurants and buffets.  · Limit or avoid alcohol.  · Limit foods high in starch and sugar.  · Limit fried foods.  · Cook foods without frying them. Baking, boiling, grilling, and broiling are all great options.  · Lose weight if you are overweight. Losing even a small amount of weight can help your overall health. It can also help prevent diseases such as diabetes and heart disease.  What foods can I eat?  Grains   Whole grains, such as whole wheat or whole grain breads, crackers, cereals, and pasta. Unsweetened oatmeal, " bulgur, barley, quinoa, or brown rice. Corn or whole wheat flour tortillas.  Vegetables   Fresh or frozen vegetables (raw, steamed, roasted, or grilled). Green salads.  Fruits   All fresh, canned (in natural juice), or frozen fruits.  Meat and Other Protein Products   Ground beef (85% or leaner), grass-fed beef, or beef trimmed of fat. Skinless chicken or turkey. Ground chicken or turkey. Pork trimmed of fat. All fish and seafood. Eggs. Dried beans, peas, or lentils. Unsalted nuts or seeds. Unsalted canned or dry beans.  Dairy   Low-fat dairy products, such as skim or 1% milk, 2% or reduced-fat cheeses, low-fat ricotta or cottage cheese, or plain low-fat yogurt.  Fats and Oils   Tub margarines without trans fats. Light or reduced-fat mayonnaise and salad dressings. Avocado. Olive, canola, sesame, or safflower oils. Natural peanut or almond butter (choose ones without added sugar and oil).  The items listed above may not be a complete list of recommended foods or beverages. Contact your dietitian for more options.   What foods are not recommended?  Grains   White bread. White pasta. White rice. Cornbread. Bagels, pastries, and croissants. Crackers that contain trans fat.  Vegetables   White potatoes. Corn. Creamed or fried vegetables. Vegetables in a cheese sauce.  Fruits   Dried fruits. Canned fruit in light or heavy syrup. Fruit juice.  Meat and Other Protein Products   Fatty cuts of meat. Ribs, chicken wings, carpenter, sausage, bologna, salami, chitterlings, fatback, hot dogs, bratwurst, and packaged luncheon meats. Liver and organ meats.  Dairy   Whole or 2% milk, cream, half-and-half, and cream cheese. Whole milk cheeses. Whole-fat or sweetened yogurt. Full-fat cheeses. Nondairy creamers and whipped toppings. Processed cheese, cheese spreads, or cheese curds.  Sweets and Desserts   Corn syrup, sugars, honey, and molasses. Candy. Jam and jelly. Syrup. Sweetened cereals. Cookies, pies, cakes, donuts, muffins, and ice  cream.  Fats and Oils   Butter, stick margarine, lard, shortening, ghee, or carpenter fat. Coconut, palm kernel, or palm oils.  Beverages   Alcohol. Sweetened drinks (such as sodas, lemonade, and fruit drinks or punches).  The items listed above may not be a complete list of foods and beverages to avoid. Contact your dietitian for more information.   This information is not intended to replace advice given to you by your health care provider. Make sure you discuss any questions you have with your health care provider.  Document Released: 06/18/2013 Document Revised: 08/24/2017 Document Reviewed: 03/19/2015  [employer] Interactive Patient Education © 2017 [employer]

## 2018-03-07 NOTE — PROGRESS NOTES
"Subjective   Mp Wen is a 63 y.o. male.     History of Present Illness   The patient is here today to follow up on right leg pain and to discuss recent lab studies.    Right lower extremity pain off and on x 2 months. Some times in the thigh and into the right buttock.  States he also has pain in the right ankle that is worse with palpation.    Diclofenac has not helped.    The following portions of the patient's history were reviewed and updated as appropriate: allergies, current medications, past social history and problem list.    Review of Systems   Respiratory: Negative for shortness of breath.    Cardiovascular: Negative for chest pain.   Musculoskeletal:        Right lower extremity pain and right ankle pain         Objective   /74  Pulse 87  Temp 98.3 °F (36.8 °C)  Ht 175.3 cm (69.02\")  Wt 82.1 kg (181 lb)  SpO2 97%  BMI 26.72 kg/m2  Physical Exam   Constitutional: He appears well-developed and well-nourished.   HENT:   Mouth/Throat: Oropharynx is clear and moist.   Eyes: Pupils are equal, round, and reactive to light.   Cardiovascular: Normal rate and regular rhythm.    Pulmonary/Chest: Effort normal and breath sounds normal.   Musculoskeletal: Normal range of motion. He exhibits no edema.   Neurological: He is alert.   Nursing note and vitals reviewed.      Assessment/Plan   Problem List Items Addressed This Visit     None      Visit Diagnoses     Sciatica of right side    -  Primary    Pure hypercholesterolemia                  Drink plenty fluids.  Work on low fat diet.    Do some low back exercises.    Continue medications as doing.    Rx for Gabapentin 300 mg  Nightly #30+2.  Benito # [id number] reviewed.        Follow up in 3 months fasting.          Scribed for Dr Denny Howard by Edith Javed CMA.          I, Denny Howard MD, personally performed the services described in this documentation, as scribed by Edith Javed in my presence, and is both accurate and complete.      "

## 2018-03-14 ENCOUNTER — TELEPHONE (OUTPATIENT)
Dept: FAMILY MEDICINE CLINIC | Facility: CLINIC | Age: 63
End: 2018-03-14

## 2018-03-14 NOTE — TELEPHONE ENCOUNTER
Dr wsift spoke with pt.  ----- Message from Dana Lunsford Rep sent at 3/9/2018  3:59 PM EST -----  Regarding: MED INTERACTIONS  Contact: [phone]  PT WOULD LIKE TO SPEAK TO SOMEONE ABOUT THE MEDS HE IS ON. HE FEELS LIKE HIS MEDS MAYBE INTERACTING TOGETHER.

## 2018-04-23 DIAGNOSIS — E78.2 MIXED HYPERLIPIDEMIA: ICD-10-CM

## 2018-04-23 RX ORDER — ATORVASTATIN CALCIUM 20 MG/1
20 TABLET, FILM COATED ORAL DAILY
Qty: 90 TABLET | Refills: 0 | Status: SHIPPED | OUTPATIENT
Start: 2018-04-23 | End: 2018-10-29 | Stop reason: SDUPTHER

## 2018-05-06 RX ORDER — LISINOPRIL 20 MG/1
TABLET ORAL
Qty: 90 TABLET | Refills: 0 | Status: SHIPPED | OUTPATIENT
Start: 2018-05-06 | End: 2018-11-19 | Stop reason: SDUPTHER

## 2018-05-07 RX ORDER — LISINOPRIL 20 MG/1
TABLET ORAL
Qty: 90 TABLET | Refills: 0 | Status: SHIPPED | OUTPATIENT
Start: 2018-05-07 | End: 2018-05-23 | Stop reason: SDUPTHER

## 2018-05-13 DIAGNOSIS — E55.9 VITAMIN D DEFICIENCY: ICD-10-CM

## 2018-05-16 RX ORDER — ERGOCALCIFEROL 1.25 MG/1
50000 CAPSULE ORAL WEEKLY
Qty: 4 CAPSULE | Refills: 2 | Status: SHIPPED | OUTPATIENT
Start: 2018-05-16 | End: 2018-10-06 | Stop reason: SDUPTHER

## 2018-05-23 ENCOUNTER — OFFICE VISIT (OUTPATIENT)
Dept: FAMILY MEDICINE CLINIC | Facility: CLINIC | Age: 63
End: 2018-05-23

## 2018-05-23 VITALS
TEMPERATURE: 98.1 F | BODY MASS INDEX: 27.16 KG/M2 | OXYGEN SATURATION: 98 % | HEART RATE: 79 BPM | DIASTOLIC BLOOD PRESSURE: 62 MMHG | WEIGHT: 183.4 LBS | HEIGHT: 69 IN | SYSTOLIC BLOOD PRESSURE: 118 MMHG

## 2018-05-23 DIAGNOSIS — M17.12 PRIMARY OSTEOARTHRITIS OF LEFT KNEE: Primary | ICD-10-CM

## 2018-05-23 PROCEDURE — 99213 OFFICE O/P EST LOW 20 MIN: CPT | Performed by: FAMILY MEDICINE

## 2018-05-23 RX ORDER — MELOXICAM 15 MG/1
15 TABLET ORAL DAILY
Qty: 30 TABLET | Refills: 1 | Status: SHIPPED | OUTPATIENT
Start: 2018-05-23 | End: 2018-10-29 | Stop reason: SDUPTHER

## 2018-05-23 NOTE — PROGRESS NOTES
"Subjective   Mp Wen is a 63 y.o. male.     History of Present Illness   The patient is here for a follow up on bilateral leg pain.    States he is still have pain. Sometime severe. The pain is worse in the left knee. Hurts with walking and standing.  Denies any chest pain or shortness of breath.    The following portions of the patient's history were reviewed and updated as appropriate: allergies, current medications, past social history and problem list.    Review of Systems   Respiratory: Negative for shortness of breath.    Cardiovascular: Negative for chest pain.   Musculoskeletal: Positive for arthralgias (left knee).       Objective   /62 (BP Location: Left arm, Patient Position: Sitting, Cuff Size: Adult)   Pulse 79   Temp 98.1 °F (36.7 °C) (Temporal Artery )   Ht 175.3 cm (69.02\")   Wt 83.2 kg (183 lb 6.4 oz)   SpO2 98%   BMI 27.07 kg/m²   Physical Exam   Constitutional: He appears well-developed and well-nourished.   Cardiovascular: Normal rate and regular rhythm.    Pulmonary/Chest: Effort normal and breath sounds normal.   Musculoskeletal:   Tenderness at the medial aspect of the left knee joint.  Is no effusion.   Nursing note and vitals reviewed.      Assessment/Plan   Problem List Items Addressed This Visit     None      Visit Diagnoses     Primary osteoarthritis of left knee    -  Primary              Drink plenty fluids.  Continue the back exercises. Do some straight leg raising exercises.    Stop the Gabapentin and the Cyclobenzaprine.  Rx for Meloxicam 15 mg daily #30+1.    Continue other medications as doing.    Check an xray of the left knee. Report results by letter.    Follow up as needed.              Scribed for Dr Denny Howard by Edith Javed CMA.          I, Denny Howard MD, personally performed the services described in this documentation, as scribed by Edith Javed in my presence, and is both accurate and complete.      "

## 2018-05-29 ENCOUNTER — HOSPITAL ENCOUNTER (OUTPATIENT)
Dept: GENERAL RADIOLOGY | Facility: HOSPITAL | Age: 63
Discharge: HOME | End: 2018-05-29
Attending: FAMILY MEDICINE | Admitting: FAMILY MEDICINE

## 2018-05-29 DIAGNOSIS — M17.12 PRIMARY OSTEOARTHRITIS OF LEFT KNEE: ICD-10-CM

## 2018-05-29 PROCEDURE — 73560 X-RAY EXAM OF KNEE 1 OR 2: CPT

## 2018-07-26 ENCOUNTER — OFFICE VISIT (OUTPATIENT)
Dept: FAMILY MEDICINE CLINIC | Facility: CLINIC | Age: 63
End: 2018-07-26

## 2018-07-26 VITALS
HEART RATE: 80 BPM | HEIGHT: 69 IN | TEMPERATURE: 99 F | SYSTOLIC BLOOD PRESSURE: 110 MMHG | OXYGEN SATURATION: 98 % | DIASTOLIC BLOOD PRESSURE: 70 MMHG | WEIGHT: 175.2 LBS | BODY MASS INDEX: 25.95 KG/M2

## 2018-07-26 DIAGNOSIS — J40 BRONCHITIS: Primary | ICD-10-CM

## 2018-07-26 PROCEDURE — 99213 OFFICE O/P EST LOW 20 MIN: CPT | Performed by: FAMILY MEDICINE

## 2018-07-26 RX ORDER — AMOXICILLIN AND CLAVULANATE POTASSIUM 875; 125 MG/1; MG/1
1 TABLET, FILM COATED ORAL 2 TIMES DAILY
Qty: 20 TABLET | Refills: 0 | Status: SHIPPED | OUTPATIENT
Start: 2018-07-26 | End: 2018-08-05

## 2018-07-26 RX ORDER — FLUTICASONE PROPIONATE 50 MCG
2 SPRAY, SUSPENSION (ML) NASAL DAILY
Qty: 1 BOTTLE | Refills: 2 | Status: SHIPPED | OUTPATIENT
Start: 2018-07-26 | End: 2021-05-24

## 2018-07-26 RX ORDER — BENZONATATE 100 MG/1
100 CAPSULE ORAL 3 TIMES DAILY PRN
Qty: 15 CAPSULE | Refills: 1 | Status: SHIPPED | OUTPATIENT
Start: 2018-07-26 | End: 2018-11-19

## 2018-07-26 NOTE — PROGRESS NOTES
"Subjective   Mp Wen is a 63 y.o. male.     Cough   This is a new problem. The current episode started in the past 7 days. The cough is productive of purulent sputum and productive of blood-tinged sputum (brown to yellow). Associated symptoms include nasal congestion and postnasal drip. Pertinent negatives include no chest pain, chills, fever or shortness of breath. The symptoms are aggravated by lying down. He has tried OTC cough suppressant (moist heat) for the symptoms. The treatment provided mild relief.      States he is still having left knee pain. Taking Meloxicam 15 mg daily    The following portions of the patient's history were reviewed and updated as appropriate: allergies, current medications, past social history and problem list.    Review of Systems   Constitutional: Negative for chills and fever.   HENT: Positive for congestion (head), postnasal drip and sinus pressure.    Respiratory: Positive for cough. Negative for shortness of breath.    Cardiovascular: Negative for chest pain.       Objective   /70 (BP Location: Left arm, Patient Position: Sitting, Cuff Size: Adult)   Pulse 80   Temp 99 °F (37.2 °C) (Temporal Artery )   Ht 175.3 cm (69.02\")   Wt 79.5 kg (175 lb 3.2 oz)   SpO2 98%   BMI 25.86 kg/m²   Physical Exam   Constitutional: He appears well-developed and well-nourished.   Cardiovascular: Normal rate and regular rhythm.    Pulmonary/Chest: Effort normal.   Nursing note and vitals reviewed.      Assessment/Plan   Problem List Items Addressed This Visit     None      Visit Diagnoses     Bronchitis    -  Primary              Drink plenty fluids.    Continue Meloxicam. Can use OTC Tylenol as needed. Avoid Advil and Aleve.    Rx for Augmentin 875 mg twice a day for 10 days #20+0.  Rx for Benzonatate 100 mg three times a day #15+1.  Rx for Fluticasone nasal spray 2 sprays in each nostril daily #1+2.    Follow up as needed.                  Scribed for Dr Denny Howard by Edith" Tegan CARPENTER.          I, Denny Howard MD, personally performed the services described in this documentation, as scribed by Edith Javed in my presence, and is both accurate and complete.

## 2018-07-30 DIAGNOSIS — E55.9 VITAMIN D DEFICIENCY: ICD-10-CM

## 2018-08-02 DIAGNOSIS — E55.9 VITAMIN D DEFICIENCY: ICD-10-CM

## 2018-08-02 RX ORDER — ERGOCALCIFEROL 1.25 MG/1
50000 CAPSULE ORAL WEEKLY
Qty: 4 CAPSULE | Refills: 2 | OUTPATIENT
Start: 2018-08-02

## 2018-08-03 DIAGNOSIS — E55.9 VITAMIN D DEFICIENCY: ICD-10-CM

## 2018-08-03 RX ORDER — ERGOCALCIFEROL 1.25 MG/1
50000 CAPSULE ORAL WEEKLY
Qty: 4 CAPSULE | Refills: 2 | OUTPATIENT
Start: 2018-08-03

## 2018-10-06 DIAGNOSIS — E55.9 VITAMIN D DEFICIENCY: ICD-10-CM

## 2018-10-07 RX ORDER — ERGOCALCIFEROL 1.25 MG/1
CAPSULE ORAL
Qty: 4 CAPSULE | Refills: 0 | Status: SHIPPED | OUTPATIENT
Start: 2018-10-07 | End: 2018-10-29 | Stop reason: SDUPTHER

## 2018-10-29 DIAGNOSIS — M17.12 PRIMARY OSTEOARTHRITIS OF LEFT KNEE: ICD-10-CM

## 2018-10-29 DIAGNOSIS — E78.2 MIXED HYPERLIPIDEMIA: ICD-10-CM

## 2018-10-29 DIAGNOSIS — E55.9 VITAMIN D DEFICIENCY: ICD-10-CM

## 2018-10-29 RX ORDER — GABAPENTIN 300 MG/1
CAPSULE ORAL
Qty: 30 CAPSULE | OUTPATIENT
Start: 2018-10-29

## 2018-10-29 RX ORDER — ERGOCALCIFEROL 1.25 MG/1
CAPSULE ORAL
Qty: 4 CAPSULE | Refills: 0 | Status: SHIPPED | OUTPATIENT
Start: 2018-10-29 | End: 2018-11-19 | Stop reason: SDUPTHER

## 2018-10-30 RX ORDER — LISINOPRIL 20 MG/1
TABLET ORAL
Qty: 90 TABLET | Refills: 0 | Status: SHIPPED | OUTPATIENT
Start: 2018-10-30 | End: 2019-01-27 | Stop reason: SDUPTHER

## 2018-10-30 RX ORDER — MELOXICAM 15 MG/1
TABLET ORAL
Qty: 30 TABLET | Refills: 1 | Status: SHIPPED | OUTPATIENT
Start: 2018-10-30 | End: 2018-11-19

## 2018-10-30 RX ORDER — ATORVASTATIN CALCIUM 20 MG/1
20 TABLET, FILM COATED ORAL DAILY
Qty: 90 TABLET | Refills: 0 | Status: SHIPPED | OUTPATIENT
Start: 2018-10-30 | End: 2019-01-27 | Stop reason: SDUPTHER

## 2018-11-16 ENCOUNTER — TELEPHONE (OUTPATIENT)
Dept: FAMILY MEDICINE CLINIC | Facility: CLINIC | Age: 63
End: 2018-11-16

## 2018-11-16 NOTE — TELEPHONE ENCOUNTER
----- Message from Dana Lunsford Rep sent at 11/16/2018  3:50 PM EST -----  Regarding: CALL PT  Contact: [phone]  PT WOULD LIKE TO SPEAK TO YOU OR DR IZQUIERDO TODAY BEFORE YOU LEAVE IF POSSIBLE ABOUT HIS HEALTH ISSUES.

## 2018-11-16 NOTE — TELEPHONE ENCOUNTER
S/w patient regarding his health concerns.     Questions - pains in legs and was Rx medications to help with pain and none are working. Pain is getting worse. Pt states it is hard to walk. Pt states this is in both legs. When he came in previously, pain was only on right leg but now the pain is also in the left knee. Pt states Dr. Howard suggested an orthopedic. Can you refer him to orthopedics or does he need to come in to see you again?     Please advise or call patient at [phone]    Thank you.

## 2018-11-16 NOTE — TELEPHONE ENCOUNTER
I spoke to the patient on the telephone.  He is been having increasing pain in both of his knees.  I recommended that he return to see me on Monday at about 3:15.  We can consider a getting a cortisone injection for him at that time and also setting him up for regular x-rays.  If indicated we can refer him then to orthopedics.  In the meantime he can use some naproxen over-the-counter.

## 2018-11-19 ENCOUNTER — OFFICE VISIT (OUTPATIENT)
Dept: FAMILY MEDICINE CLINIC | Facility: CLINIC | Age: 63
End: 2018-11-19

## 2018-11-19 VITALS
BODY MASS INDEX: 27.34 KG/M2 | RESPIRATION RATE: 20 BRPM | HEART RATE: 92 BPM | DIASTOLIC BLOOD PRESSURE: 80 MMHG | OXYGEN SATURATION: 99 % | HEIGHT: 69 IN | TEMPERATURE: 98.6 F | WEIGHT: 184.6 LBS | SYSTOLIC BLOOD PRESSURE: 128 MMHG

## 2018-11-19 DIAGNOSIS — E55.9 VITAMIN D DEFICIENCY: ICD-10-CM

## 2018-11-19 DIAGNOSIS — Z12.11 SCREEN FOR COLON CANCER: ICD-10-CM

## 2018-11-19 DIAGNOSIS — M79.604 BILATERAL LEG PAIN: Primary | ICD-10-CM

## 2018-11-19 DIAGNOSIS — M79.605 BILATERAL LEG PAIN: Primary | ICD-10-CM

## 2018-11-19 PROCEDURE — 99213 OFFICE O/P EST LOW 20 MIN: CPT | Performed by: FAMILY MEDICINE

## 2018-11-19 RX ORDER — ERGOCALCIFEROL 1.25 MG/1
50000 CAPSULE ORAL
Qty: 10 CAPSULE | Refills: 0 | Status: SHIPPED | OUTPATIENT
Start: 2018-11-19 | End: 2019-03-29 | Stop reason: SDUPTHER

## 2018-11-19 NOTE — PROGRESS NOTES
"Subjective   Mp Wen is a 63 y.o. male seen today for Med Refill and Pain (hips down into legs).     History of Present Illness   The patient is here for a follow up on bilateral hip pain that radiates down both legs.  Also pain in both knees.  Hurts all the time. Worse with walking and sometimes will give away.  States it is like a stinging pain.  States he is taking Meloxicam 15 mg and has tried Aleve with some relief.  Denies any chest pain or shortness of breath.      The following portions of the patient's history were reviewed and updated as appropriate: allergies, current medications, past social history and problem list.    Review of Systems   Constitutional: Negative for fever.   Eyes: Negative for visual disturbance.   Respiratory: Negative for shortness of breath.    Cardiovascular: Negative for chest pain.   Genitourinary: Negative for difficulty urinating.   Musculoskeletal: Positive for arthralgias and gait problem. Negative for back pain, joint swelling, myalgias and neck pain.       Objective   /80   Pulse 92   Temp 98.6 °F (37 °C) (Temporal)   Resp 20   Ht 175.3 cm (69\")   Wt 83.7 kg (184 lb 9.6 oz)   SpO2 99%   BMI 27.26 kg/m²   Physical Exam   Constitutional: He appears well-developed and well-nourished.   Musculoskeletal:   Left knee exam reveals no effusion.  There is tenderness medially and to a lesser extent laterally at the joint line.  There is no edema.  There is no tenderness proximally in the left thigh.  Examination of the right knee reveals no effusion.  There is no joint line tenderness.  No edema.   Nursing note and vitals reviewed.      Assessment/Plan   Problem List Items Addressed This Visit     None      Visit Diagnoses     Bilateral leg pain    -  Primary    Vitamin D deficiency        Screen for colon cancer                  Drink plenty fluids.    Stop Aleve and Meloxicam.  Stop the Gabapentin.    Continue other medications as doing.    Refill Vitamin D " 50,000 unit every 7 days #10+0.    Rx for Tramadol 50 mg twice a day #60+1.  Use with OTC Tylenol.    Refer to Orthopedics. Dr Granados or Waqar.    Schedule for a screening colonoscopy.    Follow up in 2 months.            Scribed for Dr Denny Howard by Edith Javed CMA.          I, Denny Howard MD, personally performed the services described in this documentation, as scribed by Edith Javed in my presence, and is both accurate and complete.

## 2018-11-21 ENCOUNTER — TELEPHONE (OUTPATIENT)
Dept: FAMILY MEDICINE CLINIC | Facility: CLINIC | Age: 63
End: 2018-11-21

## 2018-11-21 NOTE — TELEPHONE ENCOUNTER
"LFT MESSAGE TO RT MY CALL----- Message from Dana Lunsford Rep sent at 11/20/2018  4:01 PM EST -----  Regarding: ISSUES WITH MEDS  Contact: [phone]  PT CALLED TO LET YOU KNOW THAT HE FELLS \"DRUCK\" ON THE TRAMADOL. HE SAYS THAT IT HAS TAKEN THE PAIN AWAY BUT HE FEELS ILL AND IS NOT ABLE TO FUNCTION AS HE SHOULD AND HAD TO LEAVE WORK.     [facility]    "

## 2018-11-26 DIAGNOSIS — E55.9 VITAMIN D DEFICIENCY: ICD-10-CM

## 2018-11-27 RX ORDER — ERGOCALCIFEROL 1.25 MG/1
CAPSULE ORAL
Qty: 4 CAPSULE | Refills: 0 | OUTPATIENT
Start: 2018-11-27

## 2018-12-17 ENCOUNTER — OFFICE VISIT (OUTPATIENT)
Dept: ORTHOPEDIC SURGERY | Facility: CLINIC | Age: 63
End: 2018-12-17

## 2018-12-17 VITALS — HEIGHT: 69 IN | HEART RATE: 78 BPM | OXYGEN SATURATION: 98 % | WEIGHT: 181 LBS | BODY MASS INDEX: 26.81 KG/M2

## 2018-12-17 DIAGNOSIS — M17.12 PRIMARY OSTEOARTHRITIS OF LEFT KNEE: Primary | ICD-10-CM

## 2018-12-17 PROCEDURE — 99203 OFFICE O/P NEW LOW 30 MIN: CPT | Performed by: ORTHOPAEDIC SURGERY

## 2018-12-17 PROCEDURE — 20610 DRAIN/INJ JOINT/BURSA W/O US: CPT | Performed by: ORTHOPAEDIC SURGERY

## 2018-12-17 RX ORDER — TRAMADOL HYDROCHLORIDE 50 MG/1
TABLET ORAL
Refills: 1 | COMMUNITY
Start: 2018-11-19 | End: 2019-04-03

## 2018-12-17 RX ORDER — ROPIVACAINE HYDROCHLORIDE 5 MG/ML
4 INJECTION, SOLUTION EPIDURAL; INFILTRATION; PERINEURAL
Status: COMPLETED | OUTPATIENT
Start: 2018-12-17 | End: 2018-12-17

## 2018-12-17 RX ORDER — TRIAMCINOLONE ACETONIDE 40 MG/ML
40 INJECTION, SUSPENSION INTRA-ARTICULAR; INTRAMUSCULAR
Status: COMPLETED | OUTPATIENT
Start: 2018-12-17 | End: 2018-12-17

## 2018-12-17 RX ADMIN — ROPIVACAINE HYDROCHLORIDE 4 ML: 5 INJECTION, SOLUTION EPIDURAL; INFILTRATION; PERINEURAL at 16:23

## 2018-12-17 RX ADMIN — TRIAMCINOLONE ACETONIDE 40 MG: 40 INJECTION, SUSPENSION INTRA-ARTICULAR; INTRAMUSCULAR at 16:23

## 2018-12-17 NOTE — PROGRESS NOTES
[facility]    Subjective     Chief Complaint   Patient presents with   • Left Knee - Pain        HPI    Mp Wen is a 63 y.o. male.  He presents today for evaluation of left knee pain.  He has had left knee pain for 3-4 months, following no injury.  The pain is associated with swelling, popping and grinding.  It is worse with walking, standing, sitting and climbing stairs.  Pain is 7-8 out of 10, aching, burning, throbbing and stabbing.  Over time it is worsening.      Patient Active Problem List   Diagnosis   • Abnormal electrocardiogram   • Benign prostatic hypertrophy without urinary obstruction   • Dysphagia   • Erectile dysfunction of nonorganic origin   • Gastroesophageal reflux disease   • Hepatitis C virus infection   • Hypertension   • Low back pain   • Retinal detachment   • Esophageal reflux   • Vertigo   • Labyrinthitis   • Hepatitis C   • ED (erectile dysfunction) of non-organic origin   • Abnormal EKG     Past Medical History:   Diagnosis Date   • Abnormal EKG    • Acute bacterial prostatitis    • Benign prostatic hypertrophy    • ED (erectile dysfunction) of non-organic origin    • Esophageal reflux    • Hepatitis C    • Hypertension    • Labyrinthitis    • Retinal detachment    • Retinal detachment    • Vertigo       Past Surgical History:   Procedure Laterality Date   • HERNIA REPAIR        Family History   Problem Relation Age of Onset   • Coronary artery disease Mother    • Prostate cancer Father    • No Known Problems Paternal Grandmother      Social History     Socioeconomic History   • Marital status:      Spouse name: Not on file   • Number of children: Not on file   • Years of education: Not on file   • Highest education level: Not on file   Social Needs   • Financial resource strain: Not on file   • Food insecurity - worry: Not on file   • Food insecurity - inability: Not on file   • Transportation needs - medical: Not on file   • Transportation  needs - non-medical: Not on file   Occupational History   • 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     • atorvastatin (LIPITOR) 20 MG tablet TAKE 1 TABLET BY MOUTH DAILY. 90 tablet 0   • fluticasone (FLONASE) 50 MCG/ACT nasal spray 2 sprays into the nostril(s) as directed by provider Daily. Administer 2 sprays in each nostril for each dose. 1 bottle 2   • lisinopril (PRINIVIL,ZESTRIL) 20 MG tablet TAKE 1 TABLET BY MOUTH EVERY DAY 90 tablet 0   • meclizine (ANTIVERT) 25 MG tablet Take 1 tablet by mouth 3 (three) times a day as needed.     • sildenafil (REVATIO) 20 MG tablet Take 20 mg by mouth Daily As Needed.     • traMADol (ULTRAM) 50 MG tablet TAKE 1 TABLET BY MOUTH TWICE A DAY LEG PAIN**TAKE WITH TYLENOL 325 MG  1   • vitamin D (ERGOCALCIFEROL) 61929 units capsule capsule Take 1 capsule by mouth Every 7 (Seven) Days. 10 capsule 0     No current facility-administered medications on file prior to visit.       No Known Allergies     Review of Systems   Constitutional: Negative for activity change, appetite change, chills, diaphoresis, fatigue, fever and unexpected weight change.   HENT: Negative for congestion, dental problem, drooling, ear discharge, ear pain, facial swelling, hearing loss, mouth sores, nosebleeds, postnasal drip, rhinorrhea, sinus pressure, sneezing, sore throat, tinnitus, trouble swallowing and voice change.    Eyes: Negative for photophobia, pain, discharge, redness, itching and visual disturbance.   Respiratory: Negative for apnea, cough,  "choking, chest tightness, shortness of breath, wheezing and stridor.    Cardiovascular: Positive for leg swelling. Negative for chest pain and palpitations.   Gastrointestinal: Negative for abdominal distention, abdominal pain, anal bleeding, blood in stool, constipation, diarrhea, nausea, rectal pain and vomiting.   Endocrine: Negative for cold intolerance, heat intolerance, polydipsia, polyphagia and polyuria.   Genitourinary: Negative for decreased urine volume, difficulty urinating, dysuria, enuresis, flank pain, frequency, genital sores, hematuria and urgency.   Musculoskeletal: Positive for joint swelling. Negative for arthralgias, back pain, gait problem, myalgias, neck pain and neck stiffness.        Joint Pain    Skin: Negative for color change, pallor, rash and wound.   Allergic/Immunologic: Negative for environmental allergies, food allergies and immunocompromised state.   Neurological: Negative for dizziness, tremors, seizures, syncope, facial asymmetry, speech difficulty, weakness, light-headedness, numbness and headaches.   Hematological: Negative for adenopathy. Does not bruise/bleed easily.   Psychiatric/Behavioral: Negative for agitation, behavioral problems, confusion, decreased concentration, dysphoric mood, hallucinations, self-injury, sleep disturbance and suicidal ideas. The patient is not nervous/anxious and is not hyperactive.         Objective      Physical Exam  Pulse 78   Ht 176 cm (69.29\")   Wt 82.1 kg (180 lb 16 oz)   SpO2 98%   BMI 26.50 kg/m²     Body mass index is 26.5 kg/m².    General:   Mental Status:  Alert   Appearance: Cooperative, in no acute distress   Build and Nutrition: Well-nourished and well developed male   Orientation: Alert and oriented to person, place and time   Posture: Normal   Gait: Normal    Integument:   Left knee: No skin lesions, no rash, no ecchymosis    Neurologic:   Sensation:    Left foot: Intact to light touch on the dorsal and plantar " aspect   Motor:  Left lower extremity: 5/5 quadriceps, hamstrings, ankle dorsiflexors, and ankle plantar flexors  Vascular:   Left lower extremity: 2+ dorsalis pedis pulse, prompt capillary refill    Lower Extremities:   Left Knee:    Tenderness:  Medial and lateral joint line tenderness    Effusion:  None    Swelling:  None    Crepitus:  Positive    Atrophy:  None    Range of motion:  Extension: 0°       Flexion: 125°  Instability:  No varus laxity, no valgus laxity, negative anterior drawer  Deformities:  Mild varus        Imaging/Studies  Imaging Results (last 24 hours)     Procedure Component Value Units Date/Time    XR Knee 4+ View Left [[id number]] Resulted:  12/17/18 1608     Updated:  12/17/18 1609    Narrative:       Left Knee Radiographs  Indication: left knee pain  Views: Standing AP's and skiers of both knees, with lateral and sunrise   views of the left knee    Comparison: Nonweightbearing films from 5/29/2018    Findings:   Arthritic changes are seen, with medial joint space narrowing, mild   peaking of the tibial spine, and mild patellofemoral spurring.  No acute   bony abnormalities.            Assessment and Plan     Mp was seen today for pain.    Diagnoses and all orders for this visit:    Primary osteoarthritis of left knee  -     Cancel: XR Knee 4+ View Right  -     XR Knee 4+ View Left  -     Large Joint Arthrocentesis: L knee        I reviewed my findings with patient today.  At this point, we will try an intra-articular injection for both diagnostic and therapeutic purposes.  I will see him back in 2 months, for recheck.  I will see him back sooner for any problems.  Further imaging with MRI may be also considered in the future if he has no improvement or worsening.    Of note, he had 30% improvement just a few minutes following the injection.    Return in about 2 months (around 2/17/2019).      Medical Decision Making  Management Options : prescription/IM medicine  Data/Risk: radiology  tests and independent visualization of imaging, lab tests, or EMG/NCV      Jose Granados MD  12/17/18  4:32 PM

## 2018-12-17 NOTE — PROGRESS NOTES
Procedure   Large Joint Arthrocentesis: L knee  Date/Time: 12/17/2018 4:23 PM  Consent given by: patient  Site marked: site marked  Timeout: Immediately prior to procedure a time out was called to verify the correct patient, procedure, equipment, support staff and site/side marked as required   Supporting Documentation  Indications: pain   Procedure Details  Location: knee - L knee  Preparation: Patient was prepped and draped in the usual sterile fashion  Needle size: 22 G  Medications administered: 40 mg triamcinolone acetonide 40 MG/ML; 4 mL ropivacaine 0.5 %  Patient tolerance: patient tolerated the procedure well with no immediate complications

## 2018-12-18 RX ORDER — GABAPENTIN 300 MG/1
CAPSULE ORAL
Qty: 30 CAPSULE | OUTPATIENT
Start: 2018-12-18

## 2018-12-27 DIAGNOSIS — Z12.11 SCREENING FOR COLON CANCER: Primary | ICD-10-CM

## 2018-12-27 RX ORDER — SODIUM, POTASSIUM,MAG SULFATES 17.5-3.13G
SOLUTION, RECONSTITUTED, ORAL ORAL
Qty: 2 BOTTLE | Refills: 0 | Status: SHIPPED | OUTPATIENT
Start: 2018-12-27 | End: 2019-04-03

## 2019-01-03 ENCOUNTER — TELEPHONE (OUTPATIENT)
Dept: GASTROENTEROLOGY | Facility: CLINIC | Age: 64
End: 2019-01-03

## 2019-01-03 NOTE — TELEPHONE ENCOUNTER
PATIENT CALLED LVM REGARDING QUESTIONS TO PREP  RETURNED PATIENT CALL, ANSWERED QUESTION FOR HIS INSTRUCTION;  PATIENT VOICED UNDERSTANDING.

## 2019-01-03 NOTE — TELEPHONE ENCOUNTER
Patient called. Went over Suprep bowel prep instructions with patient. Patient voiced understanding.

## 2019-01-04 ENCOUNTER — LAB REQUISITION (OUTPATIENT)
Dept: LAB | Facility: HOSPITAL | Age: 64
End: 2019-01-04

## 2019-01-04 ENCOUNTER — OUTSIDE FACILITY SERVICE (OUTPATIENT)
Dept: GASTROENTEROLOGY | Facility: CLINIC | Age: 64
End: 2019-01-04

## 2019-01-04 DIAGNOSIS — Z12.11 ENCOUNTER FOR SCREENING FOR MALIGNANT NEOPLASM OF COLON: ICD-10-CM

## 2019-01-04 PROCEDURE — 88305 TISSUE EXAM BY PATHOLOGIST: CPT | Performed by: INTERNAL MEDICINE

## 2019-01-04 PROCEDURE — 45385 COLONOSCOPY W/LESION REMOVAL: CPT | Performed by: INTERNAL MEDICINE

## 2019-01-07 LAB
CYTO UR: NORMAL
LAB AP CASE REPORT: NORMAL
LAB AP CLINICAL INFORMATION: NORMAL
PATH REPORT.FINAL DX SPEC: NORMAL
PATH REPORT.GROSS SPEC: NORMAL

## 2019-01-11 ENCOUNTER — TELEPHONE (OUTPATIENT)
Dept: GASTROENTEROLOGY | Facility: CLINIC | Age: 64
End: 2019-01-11

## 2019-01-11 DIAGNOSIS — K64.8 INTERNAL HEMORRHOIDS: Primary | ICD-10-CM

## 2019-01-11 NOTE — TELEPHONE ENCOUNTER
I spoke with Mr. Wen today regarding the results of the pathology.  He did have 4 adenomatous type polyps and will need a repeat examination in 3 years.  There was also evidence of hemorrhoids and an area in the anorectal region that was inflamed along the column hemorrhoid.  I will go ahead and refer him to a colorectal colleague at the [facility].  The patient is agreeable for this referral.

## 2019-01-27 DIAGNOSIS — E78.2 MIXED HYPERLIPIDEMIA: ICD-10-CM

## 2019-01-29 RX ORDER — ATORVASTATIN CALCIUM 20 MG/1
TABLET, FILM COATED ORAL
Qty: 90 TABLET | Refills: 0 | Status: SHIPPED | OUTPATIENT
Start: 2019-01-29 | End: 2019-04-28 | Stop reason: SDUPTHER

## 2019-01-29 RX ORDER — LISINOPRIL 20 MG/1
TABLET ORAL
Qty: 90 TABLET | Refills: 0 | Status: SHIPPED | OUTPATIENT
Start: 2019-01-29 | End: 2019-04-28 | Stop reason: SDUPTHER

## 2019-01-31 DIAGNOSIS — E78.2 MIXED HYPERLIPIDEMIA: ICD-10-CM

## 2019-01-31 RX ORDER — ATORVASTATIN CALCIUM 20 MG/1
TABLET, FILM COATED ORAL
Qty: 90 TABLET | Refills: 0 | OUTPATIENT
Start: 2019-01-31

## 2019-01-31 RX ORDER — LISINOPRIL 20 MG/1
TABLET ORAL
Qty: 90 TABLET | Refills: 0 | OUTPATIENT
Start: 2019-01-31

## 2019-02-18 ENCOUNTER — OFFICE VISIT (OUTPATIENT)
Dept: ORTHOPEDIC SURGERY | Facility: CLINIC | Age: 64
End: 2019-02-18

## 2019-02-18 VITALS — HEART RATE: 91 BPM | WEIGHT: 180.34 LBS | OXYGEN SATURATION: 98 % | BODY MASS INDEX: 26.71 KG/M2 | HEIGHT: 69 IN

## 2019-02-18 DIAGNOSIS — M17.12 PRIMARY OSTEOARTHRITIS OF LEFT KNEE: Primary | ICD-10-CM

## 2019-02-18 PROCEDURE — 99212 OFFICE O/P EST SF 10 MIN: CPT | Performed by: ORTHOPAEDIC SURGERY

## 2019-02-18 NOTE — PROGRESS NOTES
[facility] Note    Subjective     Chief Complaint   Patient presents with   • Left Knee - Follow-up     2 month        HPI    Mp Wen is a 63 y.o. male.  He follows up today for his left knee.  No new complaints today.  Pain is improved from his last visit following the injection.  However, he is experiencing some giving way.  It bothers him at work in particular.  The pain is dull when it occurs, but his primary problem is the giving way at the current time.      Patient Active Problem List   Diagnosis   • Abnormal electrocardiogram   • Benign prostatic hypertrophy without urinary obstruction   • Dysphagia   • Erectile dysfunction of nonorganic origin   • Gastroesophageal reflux disease   • Hepatitis C virus infection   • Hypertension   • Low back pain   • Retinal detachment   • Esophageal reflux   • Vertigo   • Labyrinthitis   • Hepatitis C   • ED (erectile dysfunction) of non-organic origin   • Abnormal EKG     Past Medical History:   Diagnosis Date   • Abnormal EKG    • Acute bacterial prostatitis    • Benign prostatic hypertrophy    • ED (erectile dysfunction) of non-organic origin    • Esophageal reflux    • Hepatitis C    • Hypertension    • Labyrinthitis    • Retinal detachment    • Retinal detachment    • Vertigo       Past Surgical History:   Procedure Laterality Date   • HERNIA REPAIR        Family History   Problem Relation Age of Onset   • Coronary artery disease Mother    • Prostate cancer Father    • No Known Problems Paternal Grandmother      Social History     Socioeconomic History   • Marital status:      Spouse name: Not on file   • Number of children: Not on file   • Years of education: Not on file   • Highest education level: Not on file   Social Needs   • Financial resource strain: Not on file   • Food insecurity - worry: Not on file   • Food insecurity - inability: Not on file   • Transportation needs - medical: Not on file   • Transportation needs -  non-medical: Not on file   Occupational History   • 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     • atorvastatin (LIPITOR) 20 MG tablet TAKE 1 TABLET BY MOUTH EVERY DAY 90 tablet 0   • fluticasone (FLONASE) 50 MCG/ACT nasal spray 2 sprays into the nostril(s) as directed by provider Daily. Administer 2 sprays in each nostril for each dose. 1 bottle 2   • lisinopril (PRINIVIL,ZESTRIL) 20 MG tablet TAKE 1 TABLET BY MOUTH EVERY DAY 90 tablet 0   • meclizine (ANTIVERT) 25 MG tablet Take 1 tablet by mouth 3 (three) times a day as needed.     • sildenafil (REVATIO) 20 MG tablet Take 20 mg by mouth Daily As Needed.     • sodium-potassium-magnesium sulfates (SUPREP BOWEL PREP KIT) 17.5-3.13-1.6 GM/177ML solution oral solution Dispense 1 kit. Follow instructions that were mailed to your home. If you didn't receive these call [phone]. 2 bottle 0   • traMADol (ULTRAM) 50 MG tablet TAKE 1 TABLET BY MOUTH TWICE A DAY LEG PAIN**TAKE WITH TYLENOL 325 MG  1   • vitamin D (ERGOCALCIFEROL) 62418 units capsule capsule Take 1 capsule by mouth Every 7 (Seven) Days. 10 capsule 0     No current facility-administered medications on file prior to visit.       No Known Allergies     Review of Systems   Constitutional: Negative.  Negative for activity change, appetite change, chills, diaphoresis, fatigue, fever and unexpected weight change.   HENT: Negative.  Negative for congestion, dental problem, drooling, ear discharge, ear pain, facial swelling, hearing loss, mouth sores,  "nosebleeds, postnasal drip, rhinorrhea, sinus pressure, sneezing, sore throat, tinnitus, trouble swallowing and voice change.    Eyes: Negative.  Negative for photophobia, pain, discharge, redness, itching and visual disturbance.   Respiratory: Negative.  Negative for apnea, cough, choking, chest tightness, shortness of breath, wheezing and stridor.    Cardiovascular: Negative.  Negative for chest pain, palpitations and leg swelling.   Gastrointestinal: Negative.  Negative for abdominal distention, abdominal pain, anal bleeding, blood in stool, constipation, diarrhea, nausea, rectal pain and vomiting.   Endocrine: Negative.  Negative for cold intolerance, heat intolerance, polydipsia, polyphagia and polyuria.   Genitourinary: Negative.  Negative for decreased urine volume, difficulty urinating, dysuria, enuresis, flank pain, frequency, genital sores, hematuria and urgency.   Musculoskeletal: Positive for arthralgias. Negative for back pain, gait problem, joint swelling, myalgias, neck pain and neck stiffness.   Skin: Negative.  Negative for color change, pallor, rash and wound.   Allergic/Immunologic: Negative.  Negative for environmental allergies, food allergies and immunocompromised state.   Neurological: Negative.  Negative for dizziness, tremors, seizures, syncope, facial asymmetry, speech difficulty, weakness, light-headedness, numbness and headaches.   Hematological: Negative.  Negative for adenopathy. Does not bruise/bleed easily.   Psychiatric/Behavioral: Negative.  Negative for agitation, behavioral problems, confusion, decreased concentration, dysphoric mood, hallucinations, self-injury, sleep disturbance and suicidal ideas. The patient is not nervous/anxious and is not hyperactive.         Objective      Physical Exam  Pulse 91   Ht 176.5 cm (69.49\")   Wt 81.8 kg (180 lb 5.4 oz)   SpO2 98%   BMI 26.26 kg/m²     Body mass index is 26.26 kg/m².    General:   Mental Status:  Alert   Appearance: " Cooperative, in no acute distress   Build and Nutrition: Well-nourished and well developed male   Orientation: Alert and oriented to person, place and time   Posture: Normal   Gait: Normal    Integument:   Left knee: No skin lesions, no rash, no ecchymosis    Lower Extremities:   Left Knee:    Tenderness:  None    Effusion:  None    Swelling:  None    Crepitus: Positive    Range of motion:  Extension: 0°       Flexion: 130°  Instability: No varus laxity, no valgus laxity, negative anterior drawer  Deformities:  Varus        Assessment and Plan     Mp was seen today for follow-up.    Diagnoses and all orders for this visit:    Primary osteoarthritis of left knee  -     Ambulatory Referral to Physical Therapy Evaluate and treat        I reviewed my findings with the patient today.  Left knee pain has improved following the injection.  He is experiencing some giving way.  I recommended some physical therapy, and a referral was provided.  I will see him back in 3 months, but sooner for any problems.    Return in about 3 months (around 5/18/2019).      Medical Decision Making  Management Options : physical/occupational therapy      Jose Granados MD  02/18/19  4:35 PM

## 2019-03-11 ENCOUNTER — TELEPHONE (OUTPATIENT)
Dept: FAMILY MEDICINE CLINIC | Facility: CLINIC | Age: 64
End: 2019-03-11

## 2019-03-11 NOTE — TELEPHONE ENCOUNTER
----- Message from Dana Montes sent at 3/7/2019  2:46 PM EST -----  Regarding: [facility]  Contact: [phone]  PLEASE CALL PT WITH THE STATUS OF HIS [facility] PAPERWORK

## 2019-03-29 DIAGNOSIS — E55.9 VITAMIN D DEFICIENCY: ICD-10-CM

## 2019-04-01 RX ORDER — ERGOCALCIFEROL 1.25 MG/1
50000 CAPSULE ORAL
Qty: 10 CAPSULE | Refills: 0 | Status: SHIPPED | OUTPATIENT
Start: 2019-04-01 | End: 2019-04-03

## 2019-04-03 ENCOUNTER — OFFICE VISIT (OUTPATIENT)
Dept: FAMILY MEDICINE CLINIC | Facility: CLINIC | Age: 64
End: 2019-04-03

## 2019-04-03 VITALS
RESPIRATION RATE: 12 BRPM | WEIGHT: 181 LBS | DIASTOLIC BLOOD PRESSURE: 70 MMHG | HEART RATE: 74 BPM | SYSTOLIC BLOOD PRESSURE: 116 MMHG | BODY MASS INDEX: 26.35 KG/M2 | OXYGEN SATURATION: 99 % | TEMPERATURE: 98 F

## 2019-04-03 DIAGNOSIS — E55.9 VITAMIN D DEFICIENCY: ICD-10-CM

## 2019-04-03 DIAGNOSIS — R60.0 EDEMA OF RIGHT LOWER EXTREMITY: Primary | ICD-10-CM

## 2019-04-03 DIAGNOSIS — M79.604 PAIN OF RIGHT LOWER EXTREMITY: ICD-10-CM

## 2019-04-03 LAB — 25(OH)D3 SERPL-MCNC: 38.3 NG/ML

## 2019-04-03 PROCEDURE — 82306 VITAMIN D 25 HYDROXY: CPT | Performed by: FAMILY MEDICINE

## 2019-04-03 PROCEDURE — 36415 COLL VENOUS BLD VENIPUNCTURE: CPT | Performed by: FAMILY MEDICINE

## 2019-04-03 PROCEDURE — 99214 OFFICE O/P EST MOD 30 MIN: CPT | Performed by: FAMILY MEDICINE

## 2019-04-03 RX ORDER — NAPROXEN 500 MG/1
500 TABLET ORAL 2 TIMES DAILY WITH MEALS
Qty: 20 TABLET | Refills: 1 | Status: SHIPPED | OUTPATIENT
Start: 2019-04-03 | End: 2020-01-20

## 2019-04-03 NOTE — PROGRESS NOTES
Subjective   Mp Wen is a 64 y.o. male seen today for Leg Swelling.     History of Present Illness   The patient is here today with c/o pain, weakness and swelling in the right leg.    States he started to have pain and swelling in the right leg. States he has pain in the medial aspect  and posterior knee. States he can't bend the knee very well. Pain is a 4 on the scale of 1 - 10. Worse with activity. Will sometimes be an 8 to 10 on the pain scale.  Saw Dr Granados for the left leg and was given a steroid injection in the left knee and referred to PT. States his left knee is better but will occasionally give away.  Denies any chest pain or shortness of breath.    The patient has a prior history of vitamin D deficiency.  His level was 914 months ago.  He took 50,000 units a week for 10 weeks but is taken none since that time.  He needs to have a level rechecked.    The following portions of the patient's history were reviewed and updated as appropriate: allergies, current medications, past social history and problem list.    Review of Systems   Respiratory: Negative for shortness of breath.    Cardiovascular: Negative for chest pain.   Musculoskeletal: Positive for arthralgias (right knee) and joint swelling.       Objective   /70   Pulse 74   Temp 98 °F (36.7 °C)   Resp 12   Wt 82.1 kg (181 lb)   SpO2 99%   BMI 26.35 kg/m²   Physical Exam   Constitutional: He is oriented to person, place, and time. He appears well-developed and well-nourished.   Cardiovascular: Normal rate and regular rhythm.   Pulmonary/Chest: Effort normal and breath sounds normal. He has no rales.   Musculoskeletal: Normal range of motion. He exhibits edema and tenderness.   Examination of the right lower extremity reveals some tenderness along the medial aspect of the right calf.  There is some mild edema but no palpable cord.  No anterior edema.   Neurological: He is alert and oriented to person, place, and time.   Nursing note  and vitals reviewed.      Assessment/Plan   Problem List Items Addressed This Visit     None      Visit Diagnoses     Edema of right lower extremity    -  Primary    Pain of right lower extremity        Vitamin D deficiency            I believe his right lower extremity complaint likely a is a tendinitis of the flexor musculature of the left calf.  We will obtain a venous duplex to rule out DVT and go ahead and place him on naproxen.      Drink plenty fluids.    Continue medications as doing.    Check a Vitamin D level. Report results by letter.    Check a Venous duplex of the right lower extremity.    Follow up as needed.                Scribed for Dr Denny Howard by Edith Javed CMA.

## 2019-04-11 ENCOUNTER — HOSPITAL ENCOUNTER (OUTPATIENT)
Dept: CARDIOLOGY | Facility: HOSPITAL | Age: 64
Discharge: HOME | End: 2019-04-11
Admitting: FAMILY MEDICINE

## 2019-04-11 VITALS — HEIGHT: 69 IN | WEIGHT: 181 LBS | BODY MASS INDEX: 26.81 KG/M2

## 2019-04-11 DIAGNOSIS — R60.0 EDEMA OF RIGHT LOWER EXTREMITY: ICD-10-CM

## 2019-04-11 DIAGNOSIS — M79.604 PAIN OF RIGHT LOWER EXTREMITY: ICD-10-CM

## 2019-04-11 PROCEDURE — 93971 EXTREMITY STUDY: CPT | Performed by: INTERNAL MEDICINE

## 2019-04-11 PROCEDURE — 93971 EXTREMITY STUDY: CPT

## 2019-04-12 LAB
BH CV LOWER VASCULAR LEFT COMMON FEMORAL AUGMENT: NORMAL
BH CV LOWER VASCULAR LEFT COMMON FEMORAL COMPRESS: NORMAL
BH CV LOWER VASCULAR LEFT COMMON FEMORAL PHASIC: NORMAL
BH CV LOWER VASCULAR LEFT COMMON FEMORAL SPONT: NORMAL
BH CV LOWER VASCULAR RIGHT COMMON FEMORAL AUGMENT: NORMAL
BH CV LOWER VASCULAR RIGHT COMMON FEMORAL COMPRESS: NORMAL
BH CV LOWER VASCULAR RIGHT COMMON FEMORAL PHASIC: NORMAL
BH CV LOWER VASCULAR RIGHT COMMON FEMORAL SPONT: NORMAL
BH CV LOWER VASCULAR RIGHT DISTAL FEMORAL COMPRESS: NORMAL
BH CV LOWER VASCULAR RIGHT GASTRONEMIUS COMPRESS: NORMAL
BH CV LOWER VASCULAR RIGHT GREATER SAPH AK COMPRESS: NORMAL
BH CV LOWER VASCULAR RIGHT GREATER SAPH BK COMPRESS: NORMAL
BH CV LOWER VASCULAR RIGHT LESSER SAPH COMPRESS: NORMAL
BH CV LOWER VASCULAR RIGHT MID FEMORAL AUGMENT: NORMAL
BH CV LOWER VASCULAR RIGHT MID FEMORAL COMPRESS: NORMAL
BH CV LOWER VASCULAR RIGHT MID FEMORAL PHASIC: NORMAL
BH CV LOWER VASCULAR RIGHT MID FEMORAL SPONT: NORMAL
BH CV LOWER VASCULAR RIGHT PERONEAL COMPRESS: NORMAL
BH CV LOWER VASCULAR RIGHT POPLITEAL AUGMENT: NORMAL
BH CV LOWER VASCULAR RIGHT POPLITEAL COMPETENT: NORMAL
BH CV LOWER VASCULAR RIGHT POPLITEAL COMPRESS: NORMAL
BH CV LOWER VASCULAR RIGHT POPLITEAL PHASIC: NORMAL
BH CV LOWER VASCULAR RIGHT POPLITEAL SPONT: NORMAL
BH CV LOWER VASCULAR RIGHT POSTERIOR TIBIAL COMPRESS: NORMAL
BH CV LOWER VASCULAR RIGHT PROFUNDA FEMORAL COMPRESS: NORMAL
BH CV LOWER VASCULAR RIGHT PROXIMAL FEMORAL COMPRESS: NORMAL
BH CV LOWER VASCULAR RIGHT SAPHENOFEMORAL JUNCTION AUGMENT: NORMAL
BH CV LOWER VASCULAR RIGHT SAPHENOFEMORAL JUNCTION COMPRESS: NORMAL
BH CV LOWER VASCULAR RIGHT SAPHENOFEMORAL JUNCTION PHASIC: NORMAL
BH CV LOWER VASCULAR RIGHT SAPHENOFEMORAL JUNCTION SPONT: NORMAL
BH CV VAS POP FLUID COLLECTED: 1

## 2019-04-28 DIAGNOSIS — E78.2 MIXED HYPERLIPIDEMIA: ICD-10-CM

## 2019-04-29 RX ORDER — ATORVASTATIN CALCIUM 20 MG/1
TABLET, FILM COATED ORAL
Qty: 90 TABLET | Refills: 0 | Status: SHIPPED | OUTPATIENT
Start: 2019-04-29 | End: 2019-07-28 | Stop reason: SDUPTHER

## 2019-04-29 RX ORDER — LISINOPRIL 20 MG/1
TABLET ORAL
Qty: 90 TABLET | Refills: 0 | Status: SHIPPED | OUTPATIENT
Start: 2019-04-29 | End: 2019-07-28 | Stop reason: SDUPTHER

## 2019-05-01 ENCOUNTER — OFFICE VISIT (OUTPATIENT)
Dept: ORTHOPEDIC SURGERY | Facility: CLINIC | Age: 64
End: 2019-05-01

## 2019-05-01 VITALS — BODY MASS INDEX: 26.81 KG/M2 | WEIGHT: 181 LBS | HEART RATE: 60 BPM | HEIGHT: 69 IN | OXYGEN SATURATION: 98 %

## 2019-05-01 DIAGNOSIS — M17.11 PRIMARY OSTEOARTHRITIS OF RIGHT KNEE: Primary | ICD-10-CM

## 2019-05-01 PROCEDURE — 99213 OFFICE O/P EST LOW 20 MIN: CPT | Performed by: ORTHOPAEDIC SURGERY

## 2019-05-01 NOTE — PROGRESS NOTES
[facility]    Subjective     Chief Complaint   Patient presents with   • Right Knee - Pain        HPI    Mp Wen is a 64 y.o. male.  He presents today for evaluation of his right knee.  He was having right knee pain for a few months, but then it resolved a couple of weeks ago when he started an anti-inflammatory.  Pain is currently 1 out of 10, worsens with walking and standing.  He also has pain with standing at work, and the pain is associated with swelling.  No prior history of trauma.      Patient Active Problem List   Diagnosis   • Abnormal electrocardiogram   • Benign prostatic hypertrophy without urinary obstruction   • Dysphagia   • Erectile dysfunction of nonorganic origin   • Gastroesophageal reflux disease   • Hepatitis C virus infection   • Hypertension   • Low back pain   • Retinal detachment   • Esophageal reflux   • Vertigo   • Labyrinthitis   • Hepatitis C   • ED (erectile dysfunction) of non-organic origin   • Abnormal EKG     Past Medical History:   Diagnosis Date   • Abnormal EKG    • Acute bacterial prostatitis    • Benign prostatic hypertrophy    • ED (erectile dysfunction) of non-organic origin    • Esophageal reflux    • Hepatitis C    • Hypertension    • Labyrinthitis    • Retinal detachment    • Retinal detachment    • Vertigo       Past Surgical History:   Procedure Laterality Date   • HERNIA REPAIR        Family History   Problem Relation Age of Onset   • Coronary artery disease Mother    • Prostate cancer Father    • No Known Problems Paternal Grandmother      Social History     Socioeconomic History   • Marital status:      Spouse name: Not on file   • Number of children: Not on file   • Years of education: Not on file   • Highest education level: Not on file   Occupational History   • 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     • atorvastatin (LIPITOR) 20 MG tablet TAKE 1 TABLET BY MOUTH EVERY DAY 90 tablet 0   • fluticasone (FLONASE) 50 MCG/ACT nasal spray 2 sprays into the nostril(s) as directed by provider Daily. Administer 2 sprays in each nostril for each dose. 1 bottle 2   • lisinopril (PRINIVIL,ZESTRIL) 20 MG tablet TAKE 1 TABLET BY MOUTH EVERY DAY 90 tablet 0   • meclizine (ANTIVERT) 25 MG tablet Take 1 tablet by mouth 3 (three) times a day as needed.     • naproxen (NAPROSYN) 500 MG tablet Take 1 tablet by mouth 2 (Two) Times a Day With Meals. 20 tablet 1   • sildenafil (REVATIO) 20 MG tablet Take 20 mg by mouth Daily As Needed.       No current facility-administered medications on file prior to visit.       No Known Allergies     Review of Systems   Constitutional: Negative for activity change, appetite change, chills, diaphoresis, fatigue, fever and unexpected weight change.   HENT: Negative for congestion, dental problem, drooling, ear discharge, ear pain, facial swelling, hearing loss, mouth sores, nosebleeds, postnasal drip, rhinorrhea, sinus pressure, sneezing, sore throat, tinnitus, trouble swallowing and voice change.    Eyes: Negative for photophobia, pain, discharge, redness, itching and visual disturbance.   Respiratory: Negative for apnea, cough, choking, chest tightness, shortness of breath, wheezing and stridor.    Cardiovascular: Negative for chest pain, palpitations and leg swelling.   Gastrointestinal: Negative for abdominal distention, abdominal pain, anal bleeding, blood in stool, constipation, diarrhea, nausea, rectal pain and vomiting.   Endocrine: Negative for cold intolerance, heat intolerance, polydipsia,  "polyphagia and polyuria.   Genitourinary: Negative for decreased urine volume, difficulty urinating, dysuria, enuresis, flank pain, frequency, genital sores, hematuria and urgency.   Musculoskeletal: Positive for arthralgias. Negative for back pain, gait problem, joint swelling, myalgias, neck pain and neck stiffness.   Skin: Negative for color change, pallor, rash and wound.   Allergic/Immunologic: Negative for environmental allergies, food allergies and immunocompromised state.   Neurological: Negative for dizziness, tremors, seizures, syncope, facial asymmetry, speech difficulty, weakness, light-headedness, numbness and headaches.   Hematological: Negative for adenopathy. Does not bruise/bleed easily.   Psychiatric/Behavioral: Negative for agitation, behavioral problems, confusion, decreased concentration, dysphoric mood, hallucinations, self-injury, sleep disturbance and suicidal ideas. The patient is not nervous/anxious and is not hyperactive.         Objective      Physical Exam  Pulse 60   Ht 175.3 cm (69.02\")   Wt 82.1 kg (180 lb 16 oz)   SpO2 98%   BMI 26.72 kg/m²     Body mass index is 26.72 kg/m².    General:   Mental Status:  Alert   Appearance: Cooperative, in no acute distress   Build and Nutrition: Well-nourished well-developed male   Orientation: Alert and oriented to person, place and time   Posture: Normal   Gait: Normal    Integument:   Right knee: No skin lesions, no rash, no ecchymosis    Neurologic:   Sensation:    Right foot: Intact to light touch on the dorsal and plantar aspect   Motor:  Right lower extremity: 5/5 quadriceps, hamstrings, ankle dorsiflexors, and ankle plantar flexors    Vascular:   Right lower extremity: 2+ dorsalis pedis pulse, prompt capillary refill    Lower Extremities:   Right Knee:    Tenderness:  None    Effusion:  Trace    Swelling:  None    Crepitus:  None    Atrophy:  None    Range of motion:  Extension: 0°       Flexion: 130°  Instability:  No varus laxity, no " valgus laxity, negative anterior drawer  Deformities:  None      Imaging/Studies  Imaging Results (last 24 hours)     Procedure Component Value Units Date/Time    XR Knee 4+ View Right [[id number]] Resulted:  05/01/19 1614     Updated:  05/01/19 1615    Narrative:       Right Knee Radiographs  Indication: right knee pain  Views: Standing AP's and skiers of both knees, with lateral and sunrise   views of the right knee    Comparison: no prior studies available    Findings:   Medial joint space narrowing, bipartite patella, no acute bony   abnormalities, with mild patellofemoral spurring.    Impression: Right knee arthritis            Assessment and Plan     Mp was seen today for pain.    Diagnoses and all orders for this visit:    Primary osteoarthritis of right knee  -     XR Knee 4+ View Right  -     Ambulatory Referral to Physical Therapy Evaluate and treat        1. Primary osteoarthritis of right knee        I reviewed my findings with patient today.  He does have some right knee arthritis, and he is going to start doing physical therapy for his left knee.  We will add on the right knee.  I will see him back in 6 weeks to ensure improvement, but sooner for any problems.  He has already had improvement with the use of naproxen.    Return in about 6 weeks (around 6/12/2019).      Medical Decision Making  Management Options : prescription/IM medicine and physical/occupational therapy  Data/Risk: radiology tests and independent visualization of imaging, lab tests, or EMG/NCV      Jose Granados MD  05/01/19  5:00 PM

## 2019-05-08 ENCOUNTER — HOSPITAL ENCOUNTER (OUTPATIENT)
Dept: PHYSICAL THERAPY | Facility: HOSPITAL | Age: 64
Setting detail: THERAPIES SERIES
Discharge: HOME | End: 2019-05-08

## 2019-05-08 DIAGNOSIS — M17.11 PRIMARY OSTEOARTHRITIS OF RIGHT KNEE: Primary | ICD-10-CM

## 2019-05-08 DIAGNOSIS — M25.561 CHRONIC PAIN OF BOTH KNEES: Primary | ICD-10-CM

## 2019-05-08 DIAGNOSIS — G89.29 CHRONIC PAIN OF BOTH KNEES: Primary | ICD-10-CM

## 2019-05-08 DIAGNOSIS — M25.562 CHRONIC PAIN OF BOTH KNEES: Primary | ICD-10-CM

## 2019-05-08 DIAGNOSIS — M17.0 PRIMARY OSTEOARTHRITIS OF BOTH KNEES: ICD-10-CM

## 2019-05-08 PROCEDURE — 97161 PT EVAL LOW COMPLEX 20 MIN: CPT

## 2019-05-09 NOTE — THERAPY EVALUATION
"    Outpatient Physical Therapy Ortho Initial Evaluation  [facility]     Patient Name: Mp Wen  : 1955  MRN: [id number]  Today's Date: 2019      Visit Date: 2019    Patient Active Problem List   Diagnosis   • Abnormal electrocardiogram   • Benign prostatic hypertrophy without urinary obstruction   • Dysphagia   • Erectile dysfunction of nonorganic origin   • Gastroesophageal reflux disease   • Hepatitis C virus infection   • Hypertension   • Low back pain   • Retinal detachment   • Esophageal reflux   • Vertigo   • Labyrinthitis   • Hepatitis C   • ED (erectile dysfunction) of non-organic origin   • Abnormal EKG        Past Medical History:   Diagnosis Date   • Abnormal EKG    • Acute bacterial prostatitis    • Benign prostatic hypertrophy    • ED (erectile dysfunction) of non-organic origin    • Esophageal reflux    • Hepatitis C    • Hypertension    • Labyrinthitis    • Retinal detachment    • Retinal detachment    • Vertigo         Past Surgical History:   Procedure Laterality Date   • HERNIA REPAIR       Visit Dx:     ICD-10-CM ICD-9-CM   1. Chronic pain of both knees M25.561 719.46    M25.562 338.29    G89.29    2. Primary osteoarthritis of both knees M17.0 715.16     Patient History     Row Name 19 1550             History    Chief Complaint  Pain  -AC      Type of Pain  Knee pain  -AC      Date Current Problem(s) Began  -- Several months ago, progressively worsening  -AC      Brief Description of Current Complaint  Pt initially had L knee pain, then noticed later on R knee swelling. Was screened for blood clots and returned to Dr. Granados and requested therapy for both knees. Pain has become proressively worse, unsure of any patterns except maybe with weather changes. Other times notices pain simply at rest. Left leg feels like it might \"pop out\" at times with walking. Tried compression sleeve, which improved sensation unless he twists on LLE. Has pain with squats. Notices pain " walking from car into work, on feet 8 hrs/day.  -AC      Previous treatment for THIS PROBLEM  Injections;Medication L knee injection, didn't help; NSAIDs  -AC      Patient/Caregiver Goals  Relieve pain;Return to prior level of function  -AC      Patient's Rating of General Health  Very good  -AC      Occupation/sports/leisure activities  Pt builds air conditioners. Cares ofr a pet, yard/house work. States he isn't able to run with grandkids before onset of pain.  -AC      Patient seeing anyone else for problem(s)?  [place]  -AC      How has patient tried to help current problem?  Biofreeze, heating pad, heated knee brace (none helped), shoe inserts  -AC      What clinical tests have you had for this problem?  X-ray  -AC      Results of Clinical Tests  Arthritic changes; mild patellofemoral spurring, medial joint space narrowing  -AC      History of Previous Related Injuries  None  -AC         Pain     Pain Location  Knee R: superior to knee; L: anterior knee  -AC      Pain at Present  0  -AC      Pain at Best  0  -AC      Pain at Worst  7;8  -AC      Pain Frequency  Several days a week  -AC      Pain Description  Throbbing;Nagging  -AC      What Performance Factors Make the Current Problem(s) WORSE?  Pivoting on LLE, increased activity, squatting  -AC      What Performance Factors Make the Current Problem(s) BETTER?  Rest  -AC      Is your sleep disturbed?  Yes  -AC      Is medication used to assist with sleep?  No  -AC      Total hours of sleep per night  5-6  -AC      What position do you sleep in?  Right sidelying;Left sidelying Favor L side with knees apart to avoidpressure on medial L   -AC      Difficulties at work?  Inr'd pain with standing  -AC      Difficulties with ADL's?  Squatting, sharp turns  -AC      Difficulties with recreational activities?  Running with grandkids  -AC         Fall Risk Assessment    Any falls in the past year:  No  -AC         Daily Activities    Primary Language  English  -AC       How does patient learn best?  Listening;Reading;Demonstration  -AC      Barriers to learning  None  -AC      Pt Participated in POC and Goals  Yes  -AC        User Key  (r) = Recorded By, (t) = Taken By, (c) = Cosigned By    Initials Name Provider Type    AC Krista Banda, PT Physical Therapist        PT Ortho     Row Name 05/08/19 8930       Posture/Observations    Posture/Observations Comments  Squat elicited medial joint line pain at mid-range  -AC       Quarter Clearing    Quarter Clearing  Lower Quarter Clearing  -AC       DTR- Lower Quarter Clearing    Patellar tendon (L2-4)  Bilateral:;2- Normal response  -AC    Achilles tendon (S1-2)  Bilateral:;2- Normal response  -AC       Sensory Screen for Light Touch- Lower Quarter Clearing    L1 (inguinal area)  Bilateral:;Intact  -AC    L2 (anterior mid thigh)  Left:;Diminished;Right:;Intact  -AC    L3 (distal anterior thigh)  Right:;Diminished;Left:;Intact  -AC    L4 (medial lower leg/foot)  Bilateral:;Intact  -AC    L5 (lateral lower leg/great toe)  Bilateral:;Intact  -AC    S1 (bottom of foot)  Bilateral:;Intact  -AC       Myotomal Screen- Lower Quarter Clearing    Hip flexion (L2)  Right:;5 (Normal);Left:;4+ (Good +) Increased medial L knee pain  -AC    Knee extension (L3)  Bilateral:;4+ (Good +) Elicited crepitus in L knee  -AC    Ankle DF (L4)  Bilateral:;4+ (Good +)  -AC    Great toe extension (L5)  Bilateral:;5 (Normal)  -AC    Ankle PF (S1)  Right:;4 (Good);Left:;5 (Normal)  -AC    Knee flexion (S2)  Bilateral:;4+ (Good +) Joint line pain   -AC       Special Tests/Palpation    Special Tests/Palpation  Knee  -AC       Knee Palpation    Knee Palpation?  Yes  -AC    Medial Joint Line  Bilateral:;Tender  -AC       Patellar Accessory Motions    Patellar Accessory Motions Tested?  Yes L hypermobile in all directions; p! with med-lat/inf glides  -AC       General ROM    RT Lower Ext  Rt Knee Extension/Flexion  -AC    LT Lower Ext  Lt Knee Extension/Flexion   -AC       Right Lower Ext    Rt Knee Extension/Flexion AROM  0-126  -AC       Left Lower Ext    Lt Knee Extension/Flexion AROM  0-124  -AC       MMT (Manual Muscle Testing)    Rt Lower Ext  Rt Hip Internal (Medial) Rotation;Rt Hip External (Lateral) Rotation;Rt Hip ABduction;Rt Hip Extension  -AC    Lt Lower Ext  Lt Hip Internal (Medial) Rotation;Lt Hip External (Lateral) Rotation;Lt Hip ABduction;Lt Hip Extension  -AC       MMT Right Lower Ext    Rt Hip Extension MMT, Gross Movement  (4/5) good  -AC    Rt Hip ABduction MMT, Gross Movement  (4/5) good  -AC    Rt Hip Internal (Medial) Rotation MMT, Gross Movement  (4+/5) good plus  -AC    Rt Hip External (Lateral) Rotation MMT, Gross Movement  (4+/5) good plus  -AC       MMT Left Lower Ext    Lt Hip Extension MMT, Gross Movement  (4-/5) good minus  -AC    Lt Hip ABduction MMT, Gross Movement  (3+/5) fair plus  -AC    Lt Hip Internal (Medial) Rotation MMT, Gross Movement  (4/5) good  -AC    Lt Hip External (Lateral) Rotation MMT, Gross Movement  (4/5) good Jt line pain  -AC      User Key  (r) = Recorded By, (t) = Taken By, (c) = Cosigned By    Initials Name Provider Type    AC Krista Banda, PT Physical Therapist        Therapy Education  Education Details: HEP provided including: bridges with band, standing hip ABD with band, SLR, and monster walks with band. Red TB provided.  Given: HEP  Program: New  How Provided: Verbal, Demonstration, Written  Provided to: Patient  Level of Understanding: Verbalized     PT OP Goals     Row Name 05/08/19 5877          PT Short Term Goals    STG Date to Achieve  05/30/19  -AC     STG 1  Decrease bilateral knee pain frequency/intensity by > or = 50%.  -AC     STG 1 Progress  New  -AC     STG 2  Perform independent HEP to improve strength/stability of bilateral knees.  -AC     STG 2 Progress  New  -AC     STG 3  Improve hip strength to grossly 4+/5 bilaterally.  -AC     STG 3 Progress  New  -AC        Long Term Goals    LTG  Date to Achieve  06/20/19  -AC     LTG 1  Decrease bilateral knee pain frequency/intensity by > or = 75%.  -AC     LTG 1 Progress  New  -AC     LTG 2  Improve LEFS to > or = 60/80 to reflect a significant improvement in functional mobility.  -AC     LTG 2 Progress  New  -AC     LTG 3  Enable ability to walk into work, squat, and play with grandchildren with minimal-no knee pain (< or = 3/10).  -AC     LTG 3 Progress  New  -AC        Time Calculation    PT Goal Re-Cert Due Date  08/07/19  -AC       User Key  (r) = Recorded By, (t) = Taken By, (c) = Cosigned By    Initials Name Provider Type    AC Krista Banda, PT Physical Therapist        PT Assessment/Plan     Row Name 05/08/19 5846          PT Assessment    Functional Limitations  Limitation in home management;Impaired locomotion;Performance in leisure activities;Performance in work activities;Limitations in community activities  -AC     Impairments  Pain;Joint integrity;Joint mobility;Muscle strength;Sensation;Impaired muscle endurance  -AC     Assessment Comments  Pt presents with signs and symptoms consistent with diagnoses, with evolving symptoms of low complexity.  Pt has associated limitations in strength, pain, and limitations with walking, work, and leisure activities. PT intervention is warranted to reduce pain with daily activities and return to PLOF.   -AC     Please refer to paper survey for additional self-reported information  Yes  -AC     Rehab Potential  Good  -AC     Patient/caregiver participated in establishment of treatment plan and goals  Yes  -AC     Patient would benefit from skilled therapy intervention  Yes  -AC        PT Plan    PT Frequency  1x/week;2x/week  -AC     Predicted Duration of Therapy Intervention (Therapy Eval)  12 weeks   -AC     Planned CPT's?  PT EVAL LOW COMPLEXITY: 54238;PT THER PROC EA 15 MIN: 99939;PT MANUAL THERAPY EA 15 MIN: 28125;PT GAIT TRAINING EA 15 MIN: 18944;PT HOT/COLD PACK WC NONMCARE: 02716;PT SELF  CARE/MGMT/TRAIN 15 MIN: 24595;PT RE-EVAL: 96992;PT THER ACT EA 15 MIN: 77661;PT NEUROMUSC RE-EDUCATION EA 15 MIN: 81183;PT SELF CARE/HOME MGMT/TRAIN EA 15: 29311;PT ELECTRICAL STIM UNATTEND: ;PT THER MASS EA 15 MIN: 21951;PT THER SUPP EA 15 MIN;PT IONTOPHORESIS EA 15 MIN: 58733  -AC     PT Plan Comments  Progress mobility/strengthening exercises as tolerated, with manual therapy and modalities as needed.  -AC       User Key  (r) = Recorded By, (t) = Taken By, (c) = Cosigned By    Initials Name Provider Type    AC Krista Banda, PT Physical Therapist        Outcome Measure Options: Lower Extremity Functional Scale (LEFS)  Lower Extremity Functional Index  Any of your usual work, housework or school activities: A little bit of difficulty  Your usual hobbies, recreational or sporting activities: Quite a bit of difficulty  Getting into or out of the bath: A little bit of difficulty  Walking between rooms: No difficulty  Putting on your shoes or socks: Moderate difficulty  Squatting: Quite a bit of difficulty  Lifting an object, like a bag of groceries from the floor: Quite a bit of difficulty  Performing light activities around your home: A little bit of difficulty  Performing heavy activities around your home: Moderate difficulty  Getting into or out of a car: No difficulty  Walking 2 blocks: A little bit of difficulty  Walking a mile: A little bit of difficulty  Going up or down 10 stairs (about 1 flight of stairs): A little bit of difficulty  Standing for 1 hour: A little bit of difficulty  Sitting for 1 hour: A little bit of difficulty  Running on even ground: Quite a bit of difficulty  Running on uneven ground: Quite a bit of difficulty  Making sharp turns while running fast: Extreme difficulty or unable to perform activity  Hopping: A little bit of difficulty  Rolling over in bed: No difficulty  Total: 48    Time Calculation:     Start Time: 1550     Therapy Charges for Today     Code Description  Service Date Service Provider Modifiers Qty    26555651939 HC PT EVAL LOW COMPLEXITY 4 5/8/2019 Krista Banda, PT GP 1        PT G-Codes  Outcome Measure Options: Lower Extremity Functional Scale (LEFS)  Total: 48     Krista Banda, PT  5/9/2019

## 2019-05-16 ENCOUNTER — HOSPITAL ENCOUNTER (OUTPATIENT)
Dept: PHYSICAL THERAPY | Facility: HOSPITAL | Age: 64
Setting detail: THERAPIES SERIES
Discharge: HOME | End: 2019-05-16

## 2019-05-16 DIAGNOSIS — M25.561 CHRONIC PAIN OF BOTH KNEES: Primary | ICD-10-CM

## 2019-05-16 DIAGNOSIS — M25.562 CHRONIC PAIN OF BOTH KNEES: Primary | ICD-10-CM

## 2019-05-16 DIAGNOSIS — G89.29 CHRONIC PAIN OF BOTH KNEES: Primary | ICD-10-CM

## 2019-05-16 PROCEDURE — 97110 THERAPEUTIC EXERCISES: CPT | Performed by: PHYSICAL THERAPIST

## 2019-05-16 NOTE — THERAPY TREATMENT NOTE
Outpatient Physical Therapy Ortho Treatment Note   [facility]     Patient Name: Mp Wen  : 1955  MRN: [id number]  Today's Date: 2019      Visit Date: 2019    Visit Dx:    ICD-10-CM ICD-9-CM   1. Chronic pain of both knees M25.561 719.46    M25.562 338.29    G89.29        Patient Active Problem List   Diagnosis   • Abnormal electrocardiogram   • Benign prostatic hypertrophy without urinary obstruction   • Dysphagia   • Erectile dysfunction of nonorganic origin   • Gastroesophageal reflux disease   • Hepatitis C virus infection   • Hypertension   • Low back pain   • Retinal detachment   • Esophageal reflux   • Vertigo   • Labyrinthitis   • Hepatitis C   • ED (erectile dysfunction) of non-organic origin   • Abnormal EKG        Past Medical History:   Diagnosis Date   • Abnormal EKG    • Acute bacterial prostatitis    • Benign prostatic hypertrophy    • ED (erectile dysfunction) of non-organic origin    • Esophageal reflux    • Hepatitis C    • Hypertension    • Labyrinthitis    • Retinal detachment    • Retinal detachment    • Vertigo         Past Surgical History:   Procedure Laterality Date   • HERNIA REPAIR                         PT Assessment/Plan     Row Name 19 6341          PT Assessment    Assessment Comments  Ethan stretching and flexibility across anterior hips demonstrate signficant limitations left > right.  Client provided written HEP for stretching which should benefit anterior knee pain.    -CR        PT Plan    PT Plan Comments  Cont with POC.  flexibility and strength in hips as able.   -CR       User Key  (r) = Recorded By, (t) = Taken By, (c) = Cosigned By    Initials Name Provider Type    Joao Juares, PT Physical Therapist            Exercises     Row Name 19 1700 19 1600          Subjective Comments    Subjective Comments  --  Client reports bilateral knee pain continues to be bothersome  -CR        Subjective Pain    Able to rate  subjective pain?  --  yes  -CR     Pre-Treatment Pain Level  --  2  -CR     Post-Treatment Pain Level  --  2  -CR        Total Minutes    28275 - PT Therapeutic Exercise Minutes  --  30  -CR     77871 - PT Manual Therapy Minutes  5  -CR  --        Exercise 1    Exercise Name 1  --  Recubent bike   -CR     Time 1  --  6  -CR        Exercise 2    Exercise Name 2  --  hamstring, quad, kane stretching  -CR     Reps 2  --  5  -CR     Time 2  --  30  -CR     Additional Comments  --  bilaterally  -CR        Exercise 3    Exercise Name 3  --  bridging  -CR     Reps 3  --  2  -CR     Time 3  --  10  -CR        Exercise 4    Exercise Name 4  --  SLR   -CR     Sets 4  --  2  -CR     Reps 4  --  10  -CR     Additional Comments  --  bilaterally  -CR        Exercise 5    Exercise Name 5  --  monster walks  -CR     Sets 5  --  10  -CR     Additional Comments  --  red t band   -CR       User Key  (r) = Recorded By, (t) = Taken By, (c) = Cosigned By    Initials Name Provider Type    Joao Juares PT Physical Therapist                      Manual Rx (last 36 hours)      Manual Treatments     Row Name 05/16/19 1700             Total Minutes    03208 - PT Manual Therapy Minutes  5  -CR         Manual Rx 1    Manual Rx 1 Location  patellar mobilziations  -CR      Manual Rx 1 Type  bilateral  -CR        User Key  (r) = Recorded By, (t) = Taken By, (c) = Cosigned By    Initials Name Provider Type    Joao Juares PT Physical Therapist                             Time Calculation:   Start Time: 1700  Therapy Charges for Today     Code Description Service Date Service Provider Modifiers Qty    18054464326  PT THER PROC EA 15 MIN 5/16/2019 Joao Uribe, PT GP 2                    Joao Uribe PT  5/16/2019

## 2019-05-21 ENCOUNTER — HOSPITAL ENCOUNTER (OUTPATIENT)
Dept: PHYSICAL THERAPY | Facility: HOSPITAL | Age: 64
Setting detail: THERAPIES SERIES
Discharge: HOME | End: 2019-05-21

## 2019-05-21 DIAGNOSIS — G89.29 CHRONIC PAIN OF BOTH KNEES: Primary | ICD-10-CM

## 2019-05-21 DIAGNOSIS — M25.562 CHRONIC PAIN OF BOTH KNEES: Primary | ICD-10-CM

## 2019-05-21 DIAGNOSIS — M25.561 CHRONIC PAIN OF BOTH KNEES: Primary | ICD-10-CM

## 2019-05-21 PROCEDURE — 97110 THERAPEUTIC EXERCISES: CPT | Performed by: PHYSICAL THERAPIST

## 2019-05-21 NOTE — THERAPY TREATMENT NOTE
Outpatient Physical Therapy Ortho Treatment Note   [place]     Patient Name: Mp Wen  : 1955  MRN: [id number]  Today's Date: 2019      Visit Date: 2019    Visit Dx:    ICD-10-CM ICD-9-CM   1. Chronic pain of both knees M25.561 719.46    M25.562 338.29    G89.29        Patient Active Problem List   Diagnosis   • Abnormal electrocardiogram   • Benign prostatic hypertrophy without urinary obstruction   • Dysphagia   • Erectile dysfunction of nonorganic origin   • Gastroesophageal reflux disease   • Hepatitis C virus infection   • Hypertension   • Low back pain   • Retinal detachment   • Esophageal reflux   • Vertigo   • Labyrinthitis   • Hepatitis C   • ED (erectile dysfunction) of non-organic origin   • Abnormal EKG        Past Medical History:   Diagnosis Date   • Abnormal EKG    • Acute bacterial prostatitis    • Benign prostatic hypertrophy    • ED (erectile dysfunction) of non-organic origin    • Esophageal reflux    • Hepatitis C    • Hypertension    • Labyrinthitis    • Retinal detachment    • Retinal detachment    • Vertigo         Past Surgical History:   Procedure Laterality Date   • HERNIA REPAIR                         PT Assessment/Plan     Row Name 19 2554          PT Assessment    Assessment Comments  Client continues to demosntrate inflexibility throughout bilateral LE.  He will continue to benefit from flexibility and strength program for generalized motion and function.    -CR        PT Plan    PT Plan Comments  Cont with POC.   -CR       User Key  (r) = Recorded By, (t) = Taken By, (c) = Cosigned By    Initials Name Provider Type    Joao Juares, PT Physical Therapist            Exercises     Row Name 19 1700 19 1600          Subjective Comments    Subjective Comments  --  Client reports being able to tell when he misses a day because knees get stiff  -CR        Subjective Pain    Able to rate subjective pain?  --  yes  -CR      "Pre-Treatment Pain Level  --  1  -CR        Total Minutes    94947 - PT Therapeutic Exercise Minutes  --  35  -CR     47200 - PT Manual Therapy Minutes  5  -CR  --        Exercise 1    Exercise Name 1  --  Recubent bike   -CR     Time 1  --  6  -CR        Exercise 2    Exercise Name 2  --  hamstring, quad, kane stretching  -CR     Reps 2  --  5  -CR     Time 2  --  30  -CR        Exercise 3    Exercise Name 3  --  bridging  -CR     Reps 3  --  2  -CR     Time 3  --  10  -CR        Exercise 4    Exercise Name 4  --  SLR   -CR     Reps 4  --  10  -CR     Additional Comments  --  3#  -CR        Exercise 5    Exercise Name 5  --  monster walks  -CR     Sets 5  --  10  -CR     Additional Comments  --  green  -CR        Exercise 6    Exercise Name 6  --  prone hip extnesion  -CR     Sets 6  --  2  -CR     Reps 6  --  10  -CR     Additional Comments  --  3#  -CR        Exercise 7    Exercise Name 7  --  step ups   -CR     Sets 7  --  2  -CR     Reps 7  --  10  -CR     Additional Comments  --  6\" block  -CR       User Key  (r) = Recorded By, (t) = Taken By, (c) = Cosigned By    Initials Name Provider Type    Joao Juares, DEMETRIUS Physical Therapist                      Manual Rx (last 36 hours)      Manual Treatments     Row Name 05/21/19 1700             Total Minutes    84283 - PT Manual Therapy Minutes  5  -CR         Manual Rx 1    Manual Rx 1 Location  patellar mobilziations  -CR      Manual Rx 1 Type  bilateral  -CR        User Key  (r) = Recorded By, (t) = Taken By, (c) = Cosigned By    Initials Name Provider Type    Joao Juares PT Physical Therapist                             Time Calculation:   Start Time: 1700  Therapy Charges for Today     Code Description Service Date Service Provider Modifiers Qty    20134772265  PT THER PROC EA 15 MIN 5/21/2019 Joao Uribe, PT GP 3                    Joao Uribe PT  5/21/2019     "

## 2019-05-23 ENCOUNTER — HOSPITAL ENCOUNTER (OUTPATIENT)
Dept: PHYSICAL THERAPY | Facility: HOSPITAL | Age: 64
Setting detail: THERAPIES SERIES
Discharge: HOME | End: 2019-05-23

## 2019-05-23 DIAGNOSIS — M25.561 CHRONIC PAIN OF BOTH KNEES: Primary | ICD-10-CM

## 2019-05-23 DIAGNOSIS — M25.562 CHRONIC PAIN OF BOTH KNEES: Primary | ICD-10-CM

## 2019-05-23 DIAGNOSIS — G89.29 CHRONIC PAIN OF BOTH KNEES: Primary | ICD-10-CM

## 2019-05-23 PROCEDURE — 97110 THERAPEUTIC EXERCISES: CPT | Performed by: PHYSICAL THERAPIST

## 2019-05-23 NOTE — THERAPY TREATMENT NOTE
Outpatient Physical Therapy Ortho Treatment Note   [place]     Patient Name: Mp Wen  : 1955  MRN: [id number]  Today's Date: 2019      Visit Date: 2019    Visit Dx:    ICD-10-CM ICD-9-CM   1. Chronic pain of both knees M25.561 719.46    M25.562 338.29    G89.29        Patient Active Problem List   Diagnosis   • Abnormal electrocardiogram   • Benign prostatic hypertrophy without urinary obstruction   • Dysphagia   • Erectile dysfunction of nonorganic origin   • Gastroesophageal reflux disease   • Hepatitis C virus infection   • Hypertension   • Low back pain   • Retinal detachment   • Esophageal reflux   • Vertigo   • Labyrinthitis   • Hepatitis C   • ED (erectile dysfunction) of non-organic origin   • Abnormal EKG        Past Medical History:   Diagnosis Date   • Abnormal EKG    • Acute bacterial prostatitis    • Benign prostatic hypertrophy    • ED (erectile dysfunction) of non-organic origin    • Esophageal reflux    • Hepatitis C    • Hypertension    • Labyrinthitis    • Retinal detachment    • Retinal detachment    • Vertigo         Past Surgical History:   Procedure Laterality Date   • HERNIA REPAIR                         PT Assessment/Plan     Row Name 19 2043          PT Assessment    Assessment Comments  Client with soreness at distal quads today likley from kane stretching and monster walking.  Modified HEP to remove monster walk and will see client 2 visits next week to continue to progress and work towards development of HEP.    -CR        PT Plan    PT Plan Comments  Cont with POC and progress towards HEP   -CR       User Key  (r) = Recorded By, (t) = Taken By, (c) = Cosigned By    Initials Name Provider Type    Joao Juares, PT Physical Therapist            Exercises     Row Name 19 1706             Subjective Comments    Subjective Comments  Reprots increased soreness post session   -CR         Subjective Pain    Able to rate subjective pain?   yes  -CR      Pre-Treatment Pain Level  1  -CR         Total Minutes    36768 - PT Therapeutic Exercise Minutes  35  -CR      01326 - PT Manual Therapy Minutes  5  -CR         Exercise 1    Exercise Name 1  Elliptical  -CR      Time 1  6  -CR         Exercise 2    Exercise Name 2  hamstring, quad, kane stretching  -CR      Reps 2  5  -CR      Time 2  30  -CR         Exercise 3    Exercise Name 3  bridging  -CR      Reps 3  2  -CR      Time 3  10  -CR         Exercise 4    Exercise Name 4  SLR   -CR      Sets 4  2  -CR      Reps 4  10  -CR         Exercise 5    Exercise Name 5  monster walks  -CR      Sets 5  10  -CR      Additional Comments  green  -CR         Exercise 6    Exercise Name 6  prone hip extnesion  -CR      Sets 6  2  -CR      Reps 6  10  -CR      Additional Comments  3#  -CR        User Key  (r) = Recorded By, (t) = Taken By, (c) = Cosigned By    Initials Name Provider Type    Joao Juares, DEMETRIUS Physical Therapist                      Manual Rx (last 36 hours)      Manual Treatments     Row Name 05/23/19 1700             Total Minutes    61530 - PT Manual Therapy Minutes  5  -CR         Manual Rx 1    Manual Rx 1 Location  patellar mobilziations  -CR      Manual Rx 1 Type  bilateral  -CR        User Key  (r) = Recorded By, (t) = Taken By, (c) = Cosigned By    Initials Name Provider Type    Joao Juares PT Physical Therapist                             Time Calculation:   Start Time: 1645  Therapy Charges for Today     Code Description Service Date Service Provider Modifiers Qty    [id number]  PT THER PROC EA 15 MIN 5/23/2019 Joao Uribe, PT GP 3                    Joao Uribe PT  5/23/2019

## 2019-05-28 ENCOUNTER — HOSPITAL ENCOUNTER (OUTPATIENT)
Dept: PHYSICAL THERAPY | Facility: HOSPITAL | Age: 64
Setting detail: THERAPIES SERIES
Discharge: HOME | End: 2019-05-28

## 2019-05-28 DIAGNOSIS — G89.29 CHRONIC PAIN OF BOTH KNEES: Primary | ICD-10-CM

## 2019-05-28 DIAGNOSIS — M25.561 CHRONIC PAIN OF BOTH KNEES: Primary | ICD-10-CM

## 2019-05-28 DIAGNOSIS — M25.562 CHRONIC PAIN OF BOTH KNEES: Primary | ICD-10-CM

## 2019-05-28 DIAGNOSIS — M17.0 PRIMARY OSTEOARTHRITIS OF BOTH KNEES: ICD-10-CM

## 2019-05-28 PROCEDURE — 97110 THERAPEUTIC EXERCISES: CPT | Performed by: PHYSICAL THERAPIST

## 2019-05-29 NOTE — THERAPY TREATMENT NOTE
Outpatient Physical Therapy Ortho Treatment Note   [facility]     Patient Name: Mp Wen  : 1955  MRN: [id number]  Today's Date: 2019      Visit Date: 2019    Visit Dx:    ICD-10-CM ICD-9-CM   1. Chronic pain of both knees M25.561 719.46    M25.562 338.29    G89.29    2. Primary osteoarthritis of both knees M17.0 715.16       Patient Active Problem List   Diagnosis   • Abnormal electrocardiogram   • Benign prostatic hypertrophy without urinary obstruction   • Dysphagia   • Erectile dysfunction of nonorganic origin   • Gastroesophageal reflux disease   • Hepatitis C virus infection   • Hypertension   • Low back pain   • Retinal detachment   • Esophageal reflux   • Vertigo   • Labyrinthitis   • Hepatitis C   • ED (erectile dysfunction) of non-organic origin   • Abnormal EKG        Past Medical History:   Diagnosis Date   • Abnormal EKG    • Acute bacterial prostatitis    • Benign prostatic hypertrophy    • ED (erectile dysfunction) of non-organic origin    • Esophageal reflux    • Hepatitis C    • Hypertension    • Labyrinthitis    • Retinal detachment    • Retinal detachment    • Vertigo         Past Surgical History:   Procedure Laterality Date   • HERNIA REPAIR                         PT Assessment/Plan     Row Name 19 1150          PT Assessment    Assessment Comments  Client tolerates progression in strength training today well. He does demonstrate fatigue however is able to complete all sets. Progression towards ongoing HEP and independence with HEP over next few viists.    -CR        PT Plan    PT Frequency  1x/week;2x/week  -CR     PT Plan Comments  Cont with POC and progress toward HEP  -CR       User Key  (r) = Recorded By, (t) = Taken By, (c) = Cosigned By    Initials Name Provider Type    Joao Juares, PT Physical Therapist            Exercises     Row Name 19 0324             Subjective Comments    Subjective Comments  Reports decreased compliance with  HEP over the weekend.  States having a busy weekend of camping.    -CR         Subjective Pain    Able to rate subjective pain?  yes  -CR      Pre-Treatment Pain Level  2  -CR      Post-Treatment Pain Level  2  -CR         Total Minutes    74629 - PT Therapeutic Exercise Minutes  35  -CR         Exercise 1    Exercise Name 1  Elliptical  -CR      Time 1  6  -CR         Exercise 2    Exercise Name 2  hamstring, quad, kane stretching  -CR      Reps 2  5  -CR      Time 2  30  -CR         Exercise 4    Exercise Name 4  SLR   -CR      Sets 4  2  -CR      Reps 4  10  -CR      Additional Comments  3#  -CR         Exercise 5    Exercise Name 5  Standing hip abduction and extesnion  -CR      Additional Comments  red   -CR         Exercise 6    Exercise Name 6  leg press  -CR      Sets 6  2  -CR      Reps 6  10  -CR         Exercise 7    Exercise Name 7  LAQ   -CR      Sets 7  2  -CR      Reps 7  10  -CR        User Key  (r) = Recorded By, (t) = Taken By, (c) = Cosigned By    Initials Name Provider Type    CR Joao Uribe, PT Physical Therapist                                          Time Calculation:   Start Time: 1715  Therapy Charges for Today     Code Description Service Date Service Provider Modifiers Qty    62584304446  PT THER PROC EA 15 MIN 5/28/2019 Joao Uribe, PT GP 2                    Joao Uribe, DEMETRIUS  5/29/2019

## 2019-05-30 ENCOUNTER — HOSPITAL ENCOUNTER (OUTPATIENT)
Dept: PHYSICAL THERAPY | Facility: HOSPITAL | Age: 64
Setting detail: THERAPIES SERIES
Discharge: HOME | End: 2019-05-30

## 2019-05-30 PROCEDURE — 97110 THERAPEUTIC EXERCISES: CPT | Performed by: PHYSICAL THERAPIST

## 2019-05-30 NOTE — THERAPY TREATMENT NOTE
Outpatient Physical Therapy Ortho Treatment Note   [facility]     Patient Name: Mp Wen  : 1955  MRN: [id number]  Today's Date: 2019      Visit Date: 2019    Visit Dx:  No diagnosis found.    Patient Active Problem List   Diagnosis   • Abnormal electrocardiogram   • Benign prostatic hypertrophy without urinary obstruction   • Dysphagia   • Erectile dysfunction of nonorganic origin   • Gastroesophageal reflux disease   • Hepatitis C virus infection   • Hypertension   • Low back pain   • Retinal detachment   • Esophageal reflux   • Vertigo   • Labyrinthitis   • Hepatitis C   • ED (erectile dysfunction) of non-organic origin   • Abnormal EKG        Past Medical History:   Diagnosis Date   • Abnormal EKG    • Acute bacterial prostatitis    • Benign prostatic hypertrophy    • ED (erectile dysfunction) of non-organic origin    • Esophageal reflux    • Hepatitis C    • Hypertension    • Labyrinthitis    • Retinal detachment    • Retinal detachment    • Vertigo         Past Surgical History:   Procedure Laterality Date   • HERNIA REPAIR         PT Ortho     Row Name 19 1701       Subjective Comments    Subjective Comments  Client reports he has been exhausted from week this week.  Left knee intermittent pain but overall feeling well.   -CR       Subjective Pain    Able to rate subjective pain?  yes  -CR    Pre-Treatment Pain Level  2  -CR    Post-Treatment Pain Level  2  -CR       Right Lower Ext    Rt Knee Extension/Flexion AROM  0-135  -CR       Left Lower Ext    Lt Knee Extension/Flexion AROM  0-135  -CR      User Key  (r) = Recorded By, (t) = Taken By, (c) = Cosigned By    Initials Name Provider Type    Joao Juares, PT Physical Therapist                      PT Assessment/Plan     Row Name 19 4239          PT Assessment    Assessment Comments  Client reporting increases in functional outcome scores with improvement in LEFS to 64/80 and AROM improved bilaterally.  At  this time client is appropriate to trial HEP and will folllow up with PT in approximately 2 weeks prior to next MD follow up.  Anticipate transition to HEP at that time.   -CR        PT Plan    PT Plan Comments  2 week trial of HEP  -CR       User Key  (r) = Recorded By, (t) = Taken By, (c) = Cosigned By    Initials Name Provider Type    Joao Juares PT Physical Therapist            Exercises     Row Name 05/30/19 1700             Subjective Comments    Subjective Comments  Client reports he has been exhausted from week this week.  Left knee intermittent pain but overall feeling well.   -CR         Subjective Pain    Able to rate subjective pain?  yes  -CR      Pre-Treatment Pain Level  2  -CR      Post-Treatment Pain Level  2  -CR         Total Minutes    45793 - PT Therapeutic Exercise Minutes  25  -CR         Exercise 1    Exercise Name 1  recumbent bike  -CR      Time 1  6  -CR         Exercise 2    Exercise Name 2  hamstring, quad, kane stretching  -CR      Reps 2  5  -CR      Time 2  30  -CR         Exercise 3    Exercise Name 3  functional measure and ROM, review of HEP technique and POC  -CR      Time 3  15  -CR        User Key  (r) = Recorded By, (t) = Taken By, (c) = Cosigned By    Initials Name Provider Type    Joao Juares PT Physical Therapist                           Therapy Education  Education Details: Review of HEP and pOC  Program: Reinforced  How Provided: Verbal  Provided to: Patient    Outcome Measure Options: Lower Extremity Functional Scale (LEFS)  Lower Extremity Functional Index  Any of your usual work, housework or school activities: A little bit of difficulty  Your usual hobbies, recreational or sporting activities: A little bit of difficulty  Getting into or out of the bath: No difficulty  Walking between rooms: No difficulty  Putting on your shoes or socks: No difficulty  Squatting: A little bit of difficulty  Lifting an object, like a bag of groceries from the  floor: No difficulty  Performing light activities around your home: No difficulty  Performing heavy activities around your home: A little bit of difficulty  Getting into or out of a car: No difficulty  Walking 2 blocks: No difficulty  Walking a mile: No difficulty  Going up or down 10 stairs (about 1 flight of stairs): A little bit of difficulty  Standing for 1 hour: No difficulty  Sitting for 1 hour: No difficulty  Running on even ground: Quite a bit of difficulty  Running on uneven ground: Quite a bit of difficulty  Making sharp turns while running fast: Quite a bit of difficulty  Hopping: Moderate difficulty  Rolling over in bed: No difficulty  Total: 64      Time Calculation:   Start Time: 1715  Therapy Charges for Today     Code Description Service Date Service Provider Modifiers Qty    81402696063 HC PT THER PROC EA 15 MIN 5/30/2019 Joao Uribe, PT GP 2          PT G-Codes  Outcome Measure Options: Lower Extremity Functional Scale (LEFS)  Total: 64         Joao Uribe, PT  5/30/2019

## 2019-06-18 ENCOUNTER — HOSPITAL ENCOUNTER (OUTPATIENT)
Dept: PHYSICAL THERAPY | Facility: HOSPITAL | Age: 64
Setting detail: THERAPIES SERIES
Discharge: HOME | End: 2019-06-18

## 2019-06-18 DIAGNOSIS — G89.29 CHRONIC PAIN OF BOTH KNEES: Primary | ICD-10-CM

## 2019-06-18 DIAGNOSIS — M25.561 CHRONIC PAIN OF BOTH KNEES: Primary | ICD-10-CM

## 2019-06-18 DIAGNOSIS — M25.562 CHRONIC PAIN OF BOTH KNEES: Primary | ICD-10-CM

## 2019-06-18 PROCEDURE — 97110 THERAPEUTIC EXERCISES: CPT | Performed by: PHYSICAL THERAPIST

## 2019-06-18 NOTE — THERAPY DISCHARGE NOTE
Outpatient Physical Therapy Ortho Treatment Note/Discharge Summary   [place]     Patient Name: Mp Wen  : 1955  MRN: [id number]  Today's Date: 2019      Visit Date: 2019    Visit Dx:    ICD-10-CM ICD-9-CM   1. Chronic pain of both knees M25.561 719.46    M25.562 338.29    G89.29        Patient Active Problem List   Diagnosis   • Abnormal electrocardiogram   • Benign prostatic hypertrophy without urinary obstruction   • Dysphagia   • Erectile dysfunction of nonorganic origin   • Gastroesophageal reflux disease   • Hepatitis C virus infection   • Hypertension   • Low back pain   • Retinal detachment   • Esophageal reflux   • Vertigo   • Labyrinthitis   • Hepatitis C   • ED (erectile dysfunction) of non-organic origin   • Abnormal EKG        Past Medical History:   Diagnosis Date   • Abnormal EKG    • Acute bacterial prostatitis    • Benign prostatic hypertrophy    • ED (erectile dysfunction) of non-organic origin    • Esophageal reflux    • Hepatitis C    • Hypertension    • Labyrinthitis    • Retinal detachment    • Retinal detachment    • Vertigo         Past Surgical History:   Procedure Laterality Date   • HERNIA REPAIR         PT Ortho     Row Name 19 1600       Right Lower Ext    Rt Knee Extension/Flexion AROM  0-145  -CR       Left Lower Ext    Lt Knee Extension/Flexion AROM  0-145  -CR       MMT Right Lower Ext    Rt Hip Extension MMT, Gross Movement  (5/5) normal  -CR    Rt Hip ABduction MMT, Gross Movement  (5/5) normal  -CR    Rt Hip Internal (Medial) Rotation MMT, Gross Movement  (5/5) normal  -CR    Rt Hip External (Lateral) Rotation MMT, Gross Movement  (5/5) normal  -CR       MMT Left Lower Ext    Lt Hip Extension MMT, Gross Movement  (4+/5) good plus  -CR    Lt Hip ABduction MMT, Gross Movement  (4+/5) good plus  -CR    Lt Hip Internal (Medial) Rotation MMT, Gross Movement  (4+/5) good plus  -CR    Lt Hip External (Lateral) Rotation MMT, Gross Movement  (4+/5)  good plus  -CR      User Key  (r) = Recorded By, (t) = Taken By, (c) = Cosigned By    Initials Name Provider Type    Joao Juares, PT Physical Therapist                      PT Assessment/Plan     Row Name 06/18/19 3054          PT Assessment    Assessment Comments  Client has completed a total of 6 therapy sessions and demonstrates independence with HEP as well as signficant improvemnts in AROM and strengthe thorughout LE.  LEFS signficantly improved and client is in verbal agreement with POC for discharge to [facility] at this time with excellent results from therapy intervnetion.    -CR        PT Plan    PT Plan Comments  Discharge to [facility] at this time.  Follow up with MD tomorrow.    -CR       User Key  (r) = Recorded By, (t) = Taken By, (c) = Cosigned By    Initials Name Provider Type    Joao Juares, PT Physical Therapist              Exercises     Row Name 06/18/19 1600             Subjective Comments    Subjective Comments  Client to see MD tomorrow, feels left knee is more bothersome than right overall.    -CR         Subjective Pain    Able to rate subjective pain?  yes  -CR      Pre-Treatment Pain Level  1  -CR         Total Minutes    39769 - PT Therapeutic Exercise Minutes  26  -CR         Exercise 1    Exercise Name 1  recumbent bike  -CR      Time 1  6  -CR         Exercise 2    Exercise Name 2  Re assessment and discharge process completed today, included in TE time  -CR      Time 2  20  -CR        User Key  (r) = Recorded By, (t) = Taken By, (c) = Cosigned By    Initials Name Provider Type    Joao Juares, PT Physical Therapist                         PT OP Goals     Row Name 06/18/19 1600          PT Short Term Goals    STG Date to Achieve  05/30/19  -CR     STG 1  Decrease bilateral knee pain frequency/intensity by > or = 50%.  -CR     STG 1 Progress  Met  -CR     STG 2  Perform independent HEP to improve strength/stability of bilateral knees.  -CR     STG 2 Progress  Met   -CR     STG 3  Improve hip strength to grossly 4+/5 bilaterally.  -CR     STG 3 Progress  Met  -CR        Long Term Goals    LTG Date to Achieve  06/20/19  -CR     LTG 1  Decrease bilateral knee pain frequency/intensity by > or = 75%.  -CR     LTG 1 Progress  Met  -CR     LTG 2  Improve LEFS to > or = 60/80 to reflect a significant improvement in functional mobility.  -CR     LTG 2 Progress  Met  -CR     LTG 3  Enable ability to walk into work, squat, and play with grandchildren with minimal-no knee pain (< or = 3/10).  -CR     LTG 3 Progress  Met  -CR       User Key  (r) = Recorded By, (t) = Taken By, (c) = Cosigned By    Initials Name Provider Type    Joao Juares PT Physical Therapist               Outcome Measure Options: Lower Extremity Functional Scale (LEFS)  Lower Extremity Functional Index  Any of your usual work, housework or school activities: No difficulty  Your usual hobbies, recreational or sporting activities: A little bit of difficulty  Getting into or out of the bath: No difficulty  Walking between rooms: Moderate difficulty  Putting on your shoes or socks: No difficulty  Squatting: A little bit of difficulty  Lifting an object, like a bag of groceries from the floor: No difficulty  Performing light activities around your home: No difficulty  Performing heavy activities around your home: A little bit of difficulty  Getting into or out of a car: No difficulty  Walking 2 blocks: Moderate difficulty  Walking a mile: Moderate difficulty  Going up or down 10 stairs (about 1 flight of stairs): A little bit of difficulty  Standing for 1 hour: No difficulty  Sitting for 1 hour: No difficulty  Running on even ground: A little bit of difficulty  Running on uneven ground: A little bit of difficulty  Making sharp turns while running fast: Quite a bit of difficulty  Hopping: No difficulty  Rolling over in bed: No difficulty  Total: 65      Time Calculation:   Start Time: 1630  Therapy Charges for  Today     Code Description Service Date Service Provider Modifiers Qty    11933344894 HC PT THER PROC EA 15 MIN 6/18/2019 Joao Uribe, PT GP 2          PT G-Codes  Outcome Measure Options: Lower Extremity Functional Scale (LEFS)  Total: 65     OP PT Discharge Summary  Date of Discharge: 06/18/19  Reason for Discharge: All goals achieved, Maximum functional potential achieved  Outcomes Achieved: Able to achieve all goals within established timeline  Discharge Destination: Home with home program  Discharge Instructions/Additional Comments: Discharge to [facility] at this time.  Excellent outcome.  All goals met.        Joao Uribe, PT  6/18/2019

## 2019-06-19 ENCOUNTER — OFFICE VISIT (OUTPATIENT)
Dept: ORTHOPEDIC SURGERY | Facility: CLINIC | Age: 64
End: 2019-06-19

## 2019-06-19 VITALS — HEART RATE: 83 BPM | OXYGEN SATURATION: 98 % | WEIGHT: 181 LBS | BODY MASS INDEX: 26.81 KG/M2 | HEIGHT: 69 IN

## 2019-06-19 DIAGNOSIS — M17.12 PRIMARY OSTEOARTHRITIS OF LEFT KNEE: ICD-10-CM

## 2019-06-19 DIAGNOSIS — M17.11 PRIMARY OSTEOARTHRITIS OF RIGHT KNEE: Primary | ICD-10-CM

## 2019-06-19 PROCEDURE — 99213 OFFICE O/P EST LOW 20 MIN: CPT | Performed by: ORTHOPAEDIC SURGERY

## 2019-06-19 NOTE — PROGRESS NOTES
[facility] [place] Orthopaedic Surgery Clinic Note    Subjective     Chief Complaint   Patient presents with   • Right Knee - Follow-up     6 weeks   • Left Knee - Pain, Follow-up        HPI    Mp Wen is a 64 y.o. male.  Was up today for both his knees.  He has had pain in both knees for several months, left greater than right.  The right knee actually improved with physical therapy.  He has only minor pain.  The left knee continues to bother him, although he did have improvement with physical therapy.  He does have residual pain both medially and laterally.  Previous injection with brief relief.      Patient Active Problem List   Diagnosis   • Abnormal electrocardiogram   • Benign prostatic hypertrophy without urinary obstruction   • Dysphagia   • Erectile dysfunction of nonorganic origin   • Gastroesophageal reflux disease   • Hepatitis C virus infection   • Hypertension   • Low back pain   • Retinal detachment   • Esophageal reflux   • Vertigo   • Labyrinthitis   • Hepatitis C   • ED (erectile dysfunction) of non-organic origin   • Abnormal EKG     Past Medical History:   Diagnosis Date   • Abnormal EKG    • Acute bacterial prostatitis    • Benign prostatic hypertrophy    • ED (erectile dysfunction) of non-organic origin    • Esophageal reflux    • Hepatitis C    • Hypertension    • Labyrinthitis    • Retinal detachment    • Retinal detachment    • Vertigo       Past Surgical History:   Procedure Laterality Date   • HERNIA REPAIR        Family History   Problem Relation Age of Onset   • Coronary artery disease Mother    • Prostate cancer Father    • No Known Problems Paternal Grandmother      Social History     Socioeconomic History   • Marital status:      Spouse name: Not on file   • Number of children: Not on file   • Years of education: Not on file   • Highest education level: Not on file   Occupational History   • 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     • atorvastatin (LIPITOR) 20 MG tablet TAKE 1 TABLET BY MOUTH EVERY DAY 90 tablet 0   • fluticasone (FLONASE) 50 MCG/ACT nasal spray 2 sprays into the nostril(s) as directed by provider Daily. Administer 2 sprays in each nostril for each dose. 1 bottle 2   • lisinopril (PRINIVIL,ZESTRIL) 20 MG tablet TAKE 1 TABLET BY MOUTH EVERY DAY 90 tablet 0   • meclizine (ANTIVERT) 25 MG tablet Take 1 tablet by mouth 3 (three) times a day as needed.     • naproxen (NAPROSYN) 500 MG tablet Take 1 tablet by mouth 2 (Two) Times a Day With Meals. 20 tablet 1   • sildenafil (REVATIO) 20 MG tablet Take 20 mg by mouth Daily As Needed.       No current facility-administered medications on file prior to visit.       No Known Allergies     Review of Systems   Constitutional: Negative for activity change, appetite change, chills, diaphoresis, fatigue, fever and unexpected weight change.   HENT: Negative for congestion, dental problem, drooling, ear discharge, ear pain, facial swelling, hearing loss, mouth sores, nosebleeds, postnasal drip, rhinorrhea, sinus pressure, sneezing, sore throat, tinnitus, trouble swallowing and voice change.    Eyes: Negative for photophobia, pain, discharge, redness, itching and visual disturbance.   Respiratory: Negative for apnea, cough, choking, chest tightness, shortness of breath, wheezing and stridor.    Cardiovascular: Negative for chest pain, palpitations and leg swelling.   Gastrointestinal: Negative for abdominal distention, abdominal pain, anal bleeding, blood in stool, constipation, diarrhea, nausea, rectal pain and vomiting.  "  Endocrine: Negative for cold intolerance, heat intolerance, polydipsia, polyphagia and polyuria.   Genitourinary: Negative for decreased urine volume, difficulty urinating, dysuria, enuresis, flank pain, frequency, genital sores, hematuria and urgency.   Musculoskeletal: Positive for arthralgias. Negative for back pain, gait problem, joint swelling, myalgias, neck pain and neck stiffness.   Skin: Negative for color change, pallor, rash and wound.   Allergic/Immunologic: Negative for environmental allergies, food allergies and immunocompromised state.   Neurological: Negative for dizziness, tremors, seizures, syncope, facial asymmetry, speech difficulty, weakness, light-headedness, numbness and headaches.   Hematological: Negative for adenopathy. Does not bruise/bleed easily.   Psychiatric/Behavioral: Negative for agitation, behavioral problems, confusion, decreased concentration, dysphoric mood, hallucinations, self-injury, sleep disturbance and suicidal ideas. The patient is not nervous/anxious and is not hyperactive.         Objective      Physical Exam  Pulse 83   Ht 175.3 cm (69.02\")   Wt 82.1 kg (180 lb 16 oz)   SpO2 98%   BMI 26.72 kg/m²     Body mass index is 26.72 kg/m².    General:   Mental Status:  Alert   Appearance: Cooperative, in no acute distress   Build and Nutrition: Well-nourished well-developed male   Orientation: Alert and oriented to person, place and time   Posture: Normal   Gait: Normal    Integument:   Right knee: no skin lesions, no rash, no ecchymosis   Left knee: no skin lesions, no rash, no ecchymosis    Lower Extremities:   Right Knee:    Tenderness:  None    Effusion:  None    Swelling:  None    Crepitus:  Positive    Atrophy:  None    Range of motion:  Extension: 0°       Flexion: 130°  Instability:  No varus laxity, no valgus laxity, negative anterior drawer  Deformities:  None   Left Knee:    Tenderness:  Medial and lateral joint line tenderness    Effusion:  None    Swelling: "  None    Crepitus: Positive    Atrophy:  None    Range of motion:  Extension: 0°       Flexion: 130°  Instability:  No varus laxity, no valgus laxity, negative anterior drawer  Deformities:  None      Assessment and Plan     Mp was seen today for follow-up, pain and follow-up.    Diagnoses and all orders for this visit:    Primary osteoarthritis of right knee    Primary osteoarthritis of left knee  -     MRI Knee Left Without Contrast; Future        1. Primary osteoarthritis of right knee    2. Primary osteoarthritis of left knee        I reviewed my findings with the patient today.  Right knee improved with physical therapy, as well as some improvement in the left knee, but he is left with residual pain in the left knee, and we discussed options at this point.  He would like to proceed with an MRI of his knee, specifically looking at the menisci.  I will see him back after the MRI, but sooner for any problems.    Return for After Imaging Study.      Medical Decision Making  Data/Risk: radiology tests      Jose Granados MD  06/19/19  6:27 PM

## 2019-06-24 ENCOUNTER — HOSPITAL ENCOUNTER (OUTPATIENT)
Dept: MRI IMAGING | Facility: HOSPITAL | Age: 64
Discharge: HOME | End: 2019-06-24

## 2019-06-24 DIAGNOSIS — M17.12 PRIMARY OSTEOARTHRITIS OF LEFT KNEE: ICD-10-CM

## 2019-07-09 ENCOUNTER — TELEPHONE (OUTPATIENT)
Dept: ORTHOPEDIC SURGERY | Facility: CLINIC | Age: 64
End: 2019-07-09

## 2019-07-09 NOTE — TELEPHONE ENCOUNTER
Called patient back. He states that when he went to get the MRI, they wanted to get an xray of his eye to check for metal. He refused the xray, so they will not do the MRI. He called with questions about this and wanted to know why they would not do the MRI. I tried to explain to him that this is a protocol in place in the radiology dept and that we do not have control over this here. He also thought that he had an MRI done before, but I looked into his chart, and it appears to be a CT scan. I told him that I would talk to Dr Granados about this and get back to him.      Raquel

## 2019-07-09 NOTE — TELEPHONE ENCOUNTER
Patient calling because he is unable to have an MRI because of a previous sx on his eye. He would like to know what to do next. His number [phone].

## 2019-07-11 NOTE — TELEPHONE ENCOUNTER
I spoke to Dr Granados and he states that patient really needs to have MRI done, CT scan will not be as good. I called Radiology at hospital, per Jen in MRI- they are going to waive the fee for the xrays that he needs to have. I called patient and let him know that he does need to have the MRI and explained about the xray fee being waived for him. He understood and will call back to get F/U appt scheduled once he has the MRI scheduled.     Raquel

## 2019-07-16 ENCOUNTER — HOSPITAL ENCOUNTER (OUTPATIENT)
Dept: MRI IMAGING | Facility: HOSPITAL | Age: 64
Discharge: HOME | End: 2019-07-16
Admitting: ORTHOPAEDIC SURGERY

## 2019-07-16 PROCEDURE — 73721 MRI JNT OF LWR EXTRE W/O DYE: CPT

## 2019-07-28 DIAGNOSIS — E78.2 MIXED HYPERLIPIDEMIA: ICD-10-CM

## 2019-07-29 RX ORDER — ATORVASTATIN CALCIUM 20 MG/1
TABLET, FILM COATED ORAL
Qty: 90 TABLET | Refills: 0 | Status: SHIPPED | OUTPATIENT
Start: 2019-07-29 | End: 2019-10-23 | Stop reason: SDUPTHER

## 2019-07-29 RX ORDER — LISINOPRIL 20 MG/1
TABLET ORAL
Qty: 90 TABLET | Refills: 0 | Status: SHIPPED | OUTPATIENT
Start: 2019-07-29 | End: 2019-10-23 | Stop reason: SDUPTHER

## 2019-09-04 ENCOUNTER — OFFICE VISIT (OUTPATIENT)
Dept: ORTHOPEDIC SURGERY | Facility: CLINIC | Age: 64
End: 2019-09-04

## 2019-09-04 VITALS — OXYGEN SATURATION: 98 % | WEIGHT: 190.04 LBS | BODY MASS INDEX: 28.15 KG/M2 | HEIGHT: 69 IN | HEART RATE: 80 BPM

## 2019-09-04 DIAGNOSIS — M17.12 PRIMARY OSTEOARTHRITIS OF LEFT KNEE: Primary | ICD-10-CM

## 2019-09-04 PROCEDURE — 20610 DRAIN/INJ JOINT/BURSA W/O US: CPT | Performed by: ORTHOPAEDIC SURGERY

## 2019-09-04 PROCEDURE — 99213 OFFICE O/P EST LOW 20 MIN: CPT | Performed by: ORTHOPAEDIC SURGERY

## 2019-09-04 RX ORDER — TRIAMCINOLONE ACETONIDE 40 MG/ML
40 INJECTION, SUSPENSION INTRA-ARTICULAR; INTRAMUSCULAR
Status: COMPLETED | OUTPATIENT
Start: 2019-09-04 | End: 2019-09-04

## 2019-09-04 RX ORDER — ROPIVACAINE HYDROCHLORIDE 5 MG/ML
4 INJECTION, SOLUTION EPIDURAL; INFILTRATION; PERINEURAL
Status: COMPLETED | OUTPATIENT
Start: 2019-09-04 | End: 2019-09-04

## 2019-09-04 RX ADMIN — TRIAMCINOLONE ACETONIDE 40 MG: 40 INJECTION, SUSPENSION INTRA-ARTICULAR; INTRAMUSCULAR at 16:53

## 2019-09-04 RX ADMIN — ROPIVACAINE HYDROCHLORIDE 4 ML: 5 INJECTION, SOLUTION EPIDURAL; INFILTRATION; PERINEURAL at 16:53

## 2019-09-04 NOTE — PROGRESS NOTES
Procedure   Large Joint Arthrocentesis: L knee  Date/Time: 9/4/2019 4:53 PM  Consent given by: patient  Site marked: site marked  Timeout: Immediately prior to procedure a time out was called to verify the correct patient, procedure, equipment, support staff and site/side marked as required   Supporting Documentation  Indications: pain   Procedure Details  Location: knee - L knee  Preparation: Patient was prepped and draped in the usual sterile fashion  Needle size: 22 G  Approach: anterolateral  Medications administered: 40 mg triamcinolone acetonide 40 MG/ML; 4 mL ropivacaine 0.5 %  Patient tolerance: patient tolerated the procedure well with no immediate complications

## 2019-09-04 NOTE — PROGRESS NOTES
[facility] Note    Subjective     Chief Complaint   Patient presents with   • Left Knee - Follow-up     After MRI 07/16/2019        HPI    Mp Wen is a 64 y.o. male.  He follows up today for the MRI results of his left knee.  No new complaints today.  Still having some medial pain.  Improvement overall with physical therapy in the past.  Pain is associate with popping, worse with walking and standing.  Pain is currently 6 out of 10, aching in quality.  Symptoms have been present for several months.      Patient Active Problem List   Diagnosis   • Abnormal electrocardiogram   • Benign prostatic hypertrophy without urinary obstruction   • Dysphagia   • Erectile dysfunction of nonorganic origin   • Gastroesophageal reflux disease   • Hepatitis C virus infection   • Hypertension   • Low back pain   • Retinal detachment   • Esophageal reflux   • Vertigo   • Labyrinthitis   • Hepatitis C   • ED (erectile dysfunction) of non-organic origin   • Abnormal EKG     Past Medical History:   Diagnosis Date   • Abnormal EKG    • Acute bacterial prostatitis    • Benign prostatic hypertrophy    • ED (erectile dysfunction) of non-organic origin    • Esophageal reflux    • Hepatitis C    • Hypertension    • Labyrinthitis    • Retinal detachment    • Retinal detachment    • Vertigo       Past Surgical History:   Procedure Laterality Date   • HERNIA REPAIR        Family History   Problem Relation Age of Onset   • Coronary artery disease Mother    • Prostate cancer Father    • No Known Problems Paternal Grandmother      Social History     Socioeconomic History   • Marital status:      Spouse name: Not on file   • Number of children: Not on file   • Years of education: Not on file   • Highest education level: Not on file   Occupational History   • 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     • atorvastatin (LIPITOR) 20 MG tablet TAKE 1 TABLET BY MOUTH EVERY DAY 90 tablet 0   • fluticasone (FLONASE) 50 MCG/ACT nasal spray 2 sprays into the nostril(s) as directed by provider Daily. Administer 2 sprays in each nostril for each dose. 1 bottle 2   • lisinopril (PRINIVIL,ZESTRIL) 20 MG tablet TAKE 1 TABLET BY MOUTH EVERY DAY 90 tablet 0   • meclizine (ANTIVERT) 25 MG tablet Take 1 tablet by mouth 3 (three) times a day as needed.     • naproxen (NAPROSYN) 500 MG tablet Take 1 tablet by mouth 2 (Two) Times a Day With Meals. 20 tablet 1   • sildenafil (REVATIO) 20 MG tablet Take 20 mg by mouth Daily As Needed.       No current facility-administered medications on file prior to visit.       No Known Allergies     Review of Systems   Constitutional: Negative for activity change, appetite change, chills, diaphoresis, fatigue, fever and unexpected weight change.   HENT: Negative for congestion, dental problem, drooling, ear discharge, ear pain, facial swelling, hearing loss, mouth sores, nosebleeds, postnasal drip, rhinorrhea, sinus pressure, sneezing, sore throat, tinnitus, trouble swallowing and voice change.    Eyes: Negative for photophobia, pain, discharge, redness, itching and visual disturbance.   Respiratory: Negative for apnea, cough, choking, chest tightness, shortness of breath, wheezing and stridor.    Cardiovascular: Negative for chest pain, palpitations and leg swelling.   Gastrointestinal: Negative for abdominal distention, abdominal pain, anal bleeding, blood in stool, constipation, diarrhea, nausea, rectal pain and vomiting.   Endocrine: Negative for cold intolerance, heat intolerance,  "polydipsia, polyphagia and polyuria.   Genitourinary: Negative for decreased urine volume, difficulty urinating, dysuria, enuresis, flank pain, frequency, genital sores, hematuria and urgency.   Musculoskeletal: Positive for arthralgias. Negative for back pain, gait problem, joint swelling, myalgias, neck pain and neck stiffness.   Skin: Negative for color change, pallor, rash and wound.   Allergic/Immunologic: Negative for environmental allergies, food allergies and immunocompromised state.   Neurological: Negative for dizziness, tremors, seizures, syncope, facial asymmetry, speech difficulty, weakness, light-headedness, numbness and headaches.   Hematological: Negative for adenopathy. Does not bruise/bleed easily.   Psychiatric/Behavioral: Negative for agitation, behavioral problems, confusion, decreased concentration, dysphoric mood, hallucinations, self-injury, sleep disturbance and suicidal ideas. The patient is not nervous/anxious and is not hyperactive.         Objective      Physical Exam  Pulse 80   Ht 175.3 cm (69.02\")   Wt 86.2 kg (190 lb 0.6 oz)   SpO2 98%   BMI 28.05 kg/m²     Body mass index is 28.05 kg/m².    General:   Mental Status:  Alert   Appearance: Cooperative, in no acute distress   Build and Nutrition: Well-nourished well-developed male   Orientation: Alert and oriented to person, place and time   Posture: Normal   Gait: Normal    Integument:   Left knee: No skin lesions, no rash, no ecchymosis    Lower Extremities:   Left Knee:    Tenderness:  Mild medial joint line tenderness    Effusion:  None    Swelling:  None    Crepitus: Positive    Range of motion:  Extension: 0°       Flexion: 130°  Instability: No varus laxity, no valgus laxity, negative anterior drawer  Deformities:  None      Imaging/Studies    EXAMINATION: MRI KNEE LEFT  WO CONTRAST-     INDICATION: Knee pain, x-ray internal derangement; M17.12-Unilateral  primary osteoarthritis, left knee.     TECHNIQUE: Axial gradient echo " and STIR, sagittal T1 and T2 fat sat,  coronal STIR gradient echo T1 and oblique sagittal T2-weighted images of  the left knee.     COMPARISON: Left knee plain films 12/17/2018.     FINDINGS: Patient's history indicates prior bilateral knee pain, right  improved with physical therapy, persistent left knee pain medially and  laterally. Transient relief with joint injection. Evaluate for meniscal  tear, etc.     Axial images show a normal amount of joint fluid. There is some  generalized thinning of the articular cartilage but no articular  cartilage defects. Patellofemoral ligaments appear intact.  There is an  elongated popliteal fossa cyst approximately 5.0 x 1.2 x 0.5 cm maximal  dimensions.      Sagittal and coronal images show the extensor mechanism to appear  intact. ACL and PCL appear normal. Collateral ligaments appear intact  and normal. There is again mild generalized thinning of the medial and  lateral compartment articular cartilage without obvious defect. Marrow  signal appears normal. Lateral meniscus appears normal. Posterior horn  of medial meniscus shows some degenerative signal extending very near to  the inferior meniscal surface on sagittal T1 image 8 series 6, but not a  well-defined tear, and it is questionable whether this represents  internal degenerative signal instead.     IMPRESSION:  1. Mild generalized knee joint chondromalacia. No focal defect is seen.   2. Small-moderate sized intact appearing popliteal fossa cyst.  3. Abnormal signal in the posterior horn of the medial meniscus only  questionable for a small tear to the inferior articular surface.     D:  07/18/2019  E:  07/18/2019     This report was finalized on 7/22/2019 9:38 AM by DR. Avery Ace MD.    Assessment and Plan     Mp was seen today for follow-up.    Diagnoses and all orders for this visit:    Primary osteoarthritis of left knee  -     Large Joint Arthrocentesis: L knee        1. Primary osteoarthritis of left knee         I reviewed my findings with the patient today.  MRI shows some chondromalacia in the knee, with a small Baker's cyst, and no clear evidence of a meniscus tear, although there may be a small irregularity in the posterior horn.  At this point, we will try an intra-articular injection, and this was provided.  I will see him back in 2 months, but sooner for any problems.    Of note, he had 40% improvement just a few minutes following the injection today.    Return in about 2 months (around 11/4/2019).      Medical Decision Making  Management Options : prescription/IM medicine  Data/Risk: independent visualization of imaging, lab tests, or EMG/NCV      Jose Granados MD  09/04/19  4:59 PM

## 2019-10-07 ENCOUNTER — HOSPITAL ENCOUNTER (EMERGENCY)
Facility: HOSPITAL | Age: 64
Discharge: HOME | End: 2019-10-07
Attending: EMERGENCY MEDICINE | Admitting: EMERGENCY MEDICINE

## 2019-10-07 ENCOUNTER — APPOINTMENT (OUTPATIENT)
Dept: CT IMAGING | Facility: HOSPITAL | Age: 64
End: 2019-10-07

## 2019-10-07 VITALS
BODY MASS INDEX: 27.11 KG/M2 | SYSTOLIC BLOOD PRESSURE: 120 MMHG | OXYGEN SATURATION: 98 % | DIASTOLIC BLOOD PRESSURE: 68 MMHG | WEIGHT: 183 LBS | TEMPERATURE: 98.5 F | HEIGHT: 69 IN | HEART RATE: 68 BPM | RESPIRATION RATE: 16 BRPM

## 2019-10-07 DIAGNOSIS — S39.012A STRAIN OF LUMBAR REGION, INITIAL ENCOUNTER: ICD-10-CM

## 2019-10-07 DIAGNOSIS — S16.1XXA STRAIN OF NECK MUSCLE, INITIAL ENCOUNTER: Primary | ICD-10-CM
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
## 2019-10-07 PROCEDURE — 72125 CT NECK SPINE W/O DYE: CPT

## 2019-10-07 PROCEDURE — 99283 EMERGENCY DEPT VISIT LOW MDM: CPT

## 2019-10-07 RX ORDER — CYCLOBENZAPRINE HCL 10 MG
10 TABLET ORAL 3 TIMES DAILY PRN
Qty: 15 TABLET | Refills: 0 | Status: SHIPPED | OUTPATIENT
Start: 2019-10-07 | End: 2020-01-20

## 2019-10-07 RX ORDER — ACETAMINOPHEN 500 MG
1000 TABLET ORAL ONCE
Status: COMPLETED | OUTPATIENT
Start: 2019-10-07 | End: 2019-10-07

## 2019-10-07 RX ORDER — IBUPROFEN 400 MG/1
400 TABLET ORAL ONCE
Status: COMPLETED | OUTPATIENT
Start: 2019-10-07 | End: 2019-10-07

## 2019-10-07 RX ADMIN — IBUPROFEN 400 MG: 400 TABLET, FILM COATED ORAL at 23:11

## 2019-10-07 RX ADMIN — ACETAMINOPHEN 1000 MG: 500 TABLET, FILM COATED ORAL at 23:11
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
## 2019-10-08 NOTE — DISCHARGE INSTRUCTIONS
Several studies have shown that the combination of ibuprofen and acetaminophen is more effective at pain relief than narcotics.    If you are not taking Naproxen you can take 2 over-the-counter Ibuprofen tablets every 6 hours as needed for pain. Try to take the medication with food. If you develop upper abdominal discomfort, discontinue use.    If not taking another medication which contains Tylenol/acetaminophen, you may also take Tylenol every 6 hours, with a maximum 1,000 mg (two extra strength tablets) per dose.    If you wake up with significant strain/muscle tightness I recommend filling the Flexeril prescription.  Otherwise I would avoid taking a potentially sedating medication.  Do not drive while taking this medication if it causes any sedation whatsoever.

## 2019-10-23 DIAGNOSIS — E78.2 MIXED HYPERLIPIDEMIA: ICD-10-CM

## 2019-10-23 RX ORDER — ATORVASTATIN CALCIUM 20 MG/1
TABLET, FILM COATED ORAL
Qty: 90 TABLET | Refills: 0 | Status: SHIPPED | OUTPATIENT
Start: 2019-10-23 | End: 2020-01-17

## 2019-10-23 RX ORDER — LISINOPRIL 20 MG/1
TABLET ORAL
Qty: 90 TABLET | Refills: 0 | Status: SHIPPED | OUTPATIENT
Start: 2019-10-23 | End: 2020-01-17

## 2019-11-27 ENCOUNTER — OFFICE VISIT (OUTPATIENT)
Dept: ORTHOPEDIC SURGERY | Facility: CLINIC | Age: 64
End: 2019-11-27

## 2019-11-27 VITALS — BODY MASS INDEX: 26.84 KG/M2 | WEIGHT: 181.2 LBS | OXYGEN SATURATION: 98 % | HEIGHT: 69 IN | HEART RATE: 98 BPM

## 2019-11-27 DIAGNOSIS — R20.0 NUMBNESS OF LEFT LOWER EXTREMITY: ICD-10-CM

## 2019-11-27 DIAGNOSIS — M17.12 PRIMARY OSTEOARTHRITIS OF LEFT KNEE: Primary | ICD-10-CM

## 2019-11-27 PROCEDURE — 99212 OFFICE O/P EST SF 10 MIN: CPT | Performed by: ORTHOPAEDIC SURGERY

## 2019-11-27 NOTE — PROGRESS NOTES
[facility] Note    Subjective     Chief Complaint   Patient presents with   • Left Knee - Follow-up, Pain     Primary Osteoarthritis of Left Knee   Cortisone Injection given 09/04/2019        HPI    Mp Wen is a 64 y.o. male.  He follows up today for his left knee.  He continues to have left knee pain, but most of his symptoms currently are numbness.  He says the knee is numb, and if he touches the medial side of his knee, it feels like he is touching the lateral aspect of his thigh and hip.  Pain is aching when it occurs, with no worsening compared to his previous visit.  The injection on his last visit helped for about 10 to 15 minutes.      Patient Active Problem List   Diagnosis   • Abnormal electrocardiogram   • Benign prostatic hypertrophy without urinary obstruction   • Dysphagia   • Erectile dysfunction of nonorganic origin   • Gastroesophageal reflux disease   • Hepatitis C virus infection   • Hypertension   • Low back pain   • Retinal detachment   • Esophageal reflux   • Vertigo   • Labyrinthitis   • Hepatitis C   • ED (erectile dysfunction) of non-organic origin   • Abnormal EKG     Past Medical History:   Diagnosis Date   • Abnormal EKG    • Acute bacterial prostatitis    • Benign prostatic hypertrophy    • ED (erectile dysfunction) of non-organic origin    • Esophageal reflux    • Hepatitis C    • Hypertension    • Labyrinthitis    • Retinal detachment    • Retinal detachment    • Vertigo       Past Surgical History:   Procedure Laterality Date   • HERNIA REPAIR        Family History   Problem Relation Age of Onset   • Coronary artery disease Mother    • Prostate cancer Father    • No Known Problems Paternal Grandmother      Social History     Socioeconomic History   • Marital status:      Spouse name: Not on file   • Number of children: Not on file   • Years of education: Not on file   • Highest education level: Not on file   Occupational History   • 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     • atorvastatin (LIPITOR) 20 MG tablet TAKE 1 TABLET BY MOUTH EVERY DAY 90 tablet 0   • cyclobenzaprine (FLEXERIL) 10 MG tablet Take 1 tablet by mouth 3 (Three) Times a Day As Needed for Muscle Spasms. 15 tablet 0   • fluticasone (FLONASE) 50 MCG/ACT nasal spray 2 sprays into the nostril(s) as directed by provider Daily. Administer 2 sprays in each nostril for each dose. 1 bottle 2   • lisinopril (PRINIVIL,ZESTRIL) 20 MG tablet TAKE 1 TABLET BY MOUTH EVERY DAY 90 tablet 0   • meclizine (ANTIVERT) 25 MG tablet Take 1 tablet by mouth 3 (three) times a day as needed.     • naproxen (NAPROSYN) 500 MG tablet Take 1 tablet by mouth 2 (Two) Times a Day With Meals. 20 tablet 1   • sildenafil (REVATIO) 20 MG tablet Take 20 mg by mouth Daily As Needed.       No current facility-administered medications on file prior to visit.       No Known Allergies     Review of Systems   Constitutional: Negative for activity change, appetite change, chills, diaphoresis, fatigue, fever and unexpected weight change.   HENT: Negative for congestion, dental problem, drooling, ear discharge, ear pain, facial swelling, hearing loss, mouth sores, nosebleeds, postnasal drip, rhinorrhea, sinus pressure, sneezing, sore throat, tinnitus, trouble swallowing and voice change.    Eyes: Negative for photophobia, pain, discharge, redness, itching and visual disturbance.   Respiratory: Negative for apnea, cough, choking, chest tightness, shortness of breath, wheezing and stridor.    Cardiovascular: Negative for  "chest pain, palpitations and leg swelling.   Gastrointestinal: Negative for abdominal distention, abdominal pain, anal bleeding, blood in stool, constipation, diarrhea, nausea, rectal pain and vomiting.   Endocrine: Negative for cold intolerance, heat intolerance, polydipsia, polyphagia and polyuria.   Genitourinary: Negative for decreased urine volume, difficulty urinating, dysuria, enuresis, flank pain, frequency, genital sores, hematuria and urgency.   Musculoskeletal: Positive for arthralgias. Negative for back pain, gait problem, joint swelling, myalgias, neck pain and neck stiffness.   Skin: Negative for color change, pallor, rash and wound.   Allergic/Immunologic: Negative for environmental allergies, food allergies and immunocompromised state.   Neurological: Negative for dizziness, tremors, seizures, syncope, facial asymmetry, speech difficulty, weakness, light-headedness, numbness and headaches.   Hematological: Negative for adenopathy. Does not bruise/bleed easily.   Psychiatric/Behavioral: Negative for agitation, behavioral problems, confusion, decreased concentration, dysphoric mood, hallucinations, self-injury, sleep disturbance and suicidal ideas. The patient is not nervous/anxious and is not hyperactive.         Objective      Physical Exam  Pulse 98   Ht 175.3 cm (69.02\")   Wt 82.2 kg (181 lb 3.2 oz)   SpO2 98%   BMI 26.75 kg/m²     Body mass index is 26.75 kg/m².    General:   Mental Status:  Alert   Appearance: Cooperative, in no acute distress   Build and Nutrition: Well-nourished well-developed male   Orientation: Alert and oriented to person, place and time   Posture: Normal   Gait: Normal    Integument:              Left knee: No skin lesions, no rash, no ecchymosis     Lower Extremities:              Left Knee:                          Tenderness:     None                          Effusion:          None                          Swelling:          None                          Crepitus:   "        Positive                          Range of motion:        Extension:       0°                                                              Flexion:           130°  Instability:        No varus laxity, no valgus laxity, negative anterior drawer  Deformities:     None      Assessment and Plan     Mp was seen today for follow-up and pain.    Diagnoses and all orders for this visit:    Primary osteoarthritis of left knee    Numbness of left lower extremity  -     Ambulatory Referral to Neurology        1. Primary osteoarthritis of left knee    2. Numbness of left lower extremity        I reviewed my findings with the patient today.  X-ray showed mild arthritis in the past, and MRI showed mild degeneration in the knee.  Symptoms are unusual, with numbness in the knee, and do not match a specific pattern for orthopedic condition.  At this point, recommended referral to a neurologist for an evaluation.  I will be happy to see him back in the future for any worsening or problems.    Return if symptoms worsen or fail to improve.          Jose Granados MD  11/27/19  4:38 PM

## 2020-01-17 DIAGNOSIS — E78.2 MIXED HYPERLIPIDEMIA: ICD-10-CM

## 2020-01-17 RX ORDER — ATORVASTATIN CALCIUM 20 MG/1
TABLET, FILM COATED ORAL
Qty: 30 TABLET | Refills: 0 | Status: SHIPPED | OUTPATIENT
Start: 2020-01-17 | End: 2020-02-19 | Stop reason: SDUPTHER

## 2020-01-17 RX ORDER — LISINOPRIL 20 MG/1
TABLET ORAL
Qty: 30 TABLET | Refills: 0 | Status: SHIPPED | OUTPATIENT
Start: 2020-01-17 | End: 2020-02-19 | Stop reason: SDUPTHER

## 2020-01-20 ENCOUNTER — OFFICE VISIT (OUTPATIENT)
Dept: NEUROLOGY | Facility: CLINIC | Age: 65
End: 2020-01-20

## 2020-01-20 VITALS
WEIGHT: 181 LBS | DIASTOLIC BLOOD PRESSURE: 60 MMHG | SYSTOLIC BLOOD PRESSURE: 114 MMHG | HEIGHT: 69 IN | BODY MASS INDEX: 26.81 KG/M2

## 2020-01-20 DIAGNOSIS — G57.82 SAPHENOUS NERVE NEUROPATHY, LEFT: Primary | ICD-10-CM

## 2020-01-20 PROCEDURE — 99204 OFFICE O/P NEW MOD 45 MIN: CPT | Performed by: PSYCHIATRY & NEUROLOGY

## 2020-01-20 NOTE — PROGRESS NOTES
Subjective:    CC: Mp Wen is seen today in consultation at the request of Jose Granados MD for Numbness (left knee)       HPI:  Patient is a 64-year-old male referred to the clinic for evaluation of medial and lateral knee area numbness.  He reports that he started having the symptoms after he underwent knee injection approximately a year ago.  He reports that first injection was done through medial aspect and soon after the injection, he started having numbness involving the medial aspect of the knee.  That after the injection, knee pain resolved only for a short period of time and then it returned.  Following this, he underwent second injection and is unsure whether it was done through medial however following this, he has started having numbness involving inferior lateral aspect of the knee on the left.  He reports that it feels like dead skin in these areas and sometimes it can lead to pain because of significant numbness.  The symptoms are worse mostly at nighttime when he is not moving around.  He denies any weakness.  He denies any low back pain or radicular symptoms coming from back.  Denies any numbness in the sole of his left foot and denies any problems with ankle dorsiflexion or plantarflexion.  No problems with bowel or bladder control.    The following portions of the patient's history were reviewed today and updated as of 01/20/2020  : allergies, social history and problem list.  This document will be scanned to patient's chart.      Current Outpatient Medications:   •  aspirin (ASPIRIN LOW DOSE) 81 MG tablet, Take 81 mg by mouth Daily., Disp: , Rfl:   •  atorvastatin (LIPITOR) 20 MG tablet, TAKE 1 TABLET BY MOUTH EVERY DAY, Disp: 30 tablet, Rfl: 0  •  fluticasone (FLONASE) 50 MCG/ACT nasal spray, 2 sprays into the nostril(s) as directed by provider Daily. Administer 2 sprays in each nostril for each dose., Disp: 1 bottle, Rfl: 2  •  lisinopril (PRINIVIL,ZESTRIL) 20 MG tablet, TAKE 1 TABLET  "BY MOUTH EVERY DAY, Disp: 30 tablet, Rfl: 0  •  meclizine (ANTIVERT) 25 MG tablet, Take 1 tablet by mouth 3 (three) times a day as needed., Disp: , Rfl:   •  sildenafil (REVATIO) 20 MG tablet, Take 20 mg by mouth Daily As Needed., Disp: , Rfl:    Past Medical History:   Diagnosis Date   • Abnormal EKG    • Acute bacterial prostatitis    • Benign prostatic hypertrophy    • ED (erectile dysfunction) of non-organic origin    • Esophageal reflux    • Hepatitis C    • Hypertension    • Labyrinthitis    • Retinal detachment    • Retinal detachment    • Vertigo       Past Surgical History:   Procedure Laterality Date   • HERNIA REPAIR        Family History   Problem Relation Age of Onset   • Coronary artery disease Mother    • Prostate cancer Father    • No Known Problems Paternal Grandmother       Review of Systems   Eyes: Positive for photophobia and visual disturbance.   Musculoskeletal: Positive for neck stiffness.   Neurological: Positive for numbness.       All other systems reviewed and are negative     Objective:    /60   Ht 175.3 cm (69.02\")   Wt 82.1 kg (181 lb)   BMI 26.71 kg/m²     Neurology Exam:    General apperance: NAD.     Mental status: Alert, awake and oriented to time place and person.    Recent and Remote memory: Can recall 3/3 objects at 5 minutes. Can recall historical events.     Attention span and Concentration: Serial 7s: Normal.     Fund of knowledge:  Normal.     Language and Speech: No aphasia or dysarthria.    Naming , Repitition and Comprehension:  Can name objects, repeat a sentence and follow commands. Speech is clear and fluent with good repetition, comprehension, and naming.    Cranial Nerves:   CN II: Visual fields are full. Intact. Fundi - Normal, No papillederma, Pupils - CHUNG  CN III, IV and VI: Extraocular movements are intact. Normal saccades.   CN V: Facial sensation is intact.   CN VII: Muscles of facial expression reveal no asymmetry. Intact.   CN VIII: Hearing is " "intact. Whispered voice intact.   CN IX and X: Palate elevates symmetrically. Intact  CN XI: Shoulder shrug is intact.   CN XII: Tongue is midline without evidence of atrophy or fasciculation.     Motor:  Right UE muscle strength 5/5. Normal tone.     Left UE muscle strength 5/5. Normal tone.      Right LE muscle strength5/5. Normal tone.     Left LE muscle strength 5/5. Normal tone.      Sensory: Complete loss of sensation involving the inferomedial and inferolateral aspect of left knee.    DTRs: 2+ bilaterally in upper and lower extremities.    Babinski: Negative bilaterally.    Co-ordination: Normal finger-to-nose, heel to shin B/L.    Rhomberg: Negative.    Gait: Normal.    Cardiovascular: Regular rate and rhythm without murmur, gallop or rub.    Ophthalmoscopic exam: Normal fundi, no papilledema.    Assessment and Plan:  1. Saphenous nerve neuropathy, left  2.  Lateral sural cutaneous nerve injury, left    -Based on the sensory loss, it is very well possible that he has had injury to the infrapatellar branch of the saphenous nerve on the left as well as lateral sural cutaneous nerve on the left causing complete loss of sensation in the infero-medial and infero-lateral aspect of the knee on the left.  The symptoms started soon after he had cortisone injection performed on the left knee.  I would like to get EMG/nerve conduction study for further evaluation.  Will prescribe him compounded cream consisting of gabapentin, muscle relaxer, NSAID to be applied locally in these areas for symptomatic relief.  I will see him back in 6 to 8 weeks for follow-up.  - EMG & Nerve Conduction Test; Future \"       Return in about 8 weeks (around 3/16/2020).     Ernesto Bertrand MD      "

## 2020-02-03 ENCOUNTER — HOSPITAL ENCOUNTER (OUTPATIENT)
Dept: NEUROLOGY | Facility: HOSPITAL | Age: 65
Discharge: HOME | End: 2020-02-03
Admitting: PSYCHIATRY & NEUROLOGY

## 2020-02-03 DIAGNOSIS — G57.82 SAPHENOUS NERVE NEUROPATHY, LEFT: ICD-10-CM

## 2020-02-03 PROCEDURE — 95886 MUSC TEST DONE W/N TEST COMP: CPT

## 2020-02-03 PROCEDURE — 95911 NRV CNDJ TEST 9-10 STUDIES: CPT

## 2020-02-03 PROCEDURE — 95910 NRV CNDJ TEST 7-8 STUDIES: CPT

## 2020-02-04 ENCOUNTER — TELEPHONE (OUTPATIENT)
Dept: NEUROLOGY | Facility: CLINIC | Age: 65
End: 2020-02-04

## 2020-02-04 NOTE — TELEPHONE ENCOUNTER
Called and informed pt of EMG results per . Pt acknowledged understanding.     Pt states that he can tell a difference since he started using the cream. Pt states his numbness has decreased. Confirmed follow-up appt with pt/.

## 2020-02-04 NOTE — TELEPHONE ENCOUNTER
----- Message from Ernesto Bertrand MD sent at 2/3/2020  1:32 PM EST -----  Please call the patient regarding his abnormal result.  EMG/nerve conduction study confirms mild injury to saphenous nerve and sural nerve causing the ongoing symptoms.  Hopefully with time, the symptoms will subside.  Ask him how he is doing with the compounded cream that I had prescribed.  Thanks.

## 2020-02-19 ENCOUNTER — OFFICE VISIT (OUTPATIENT)
Dept: FAMILY MEDICINE CLINIC | Facility: CLINIC | Age: 65
End: 2020-02-19

## 2020-02-19 VITALS
WEIGHT: 185 LBS | RESPIRATION RATE: 15 BRPM | SYSTOLIC BLOOD PRESSURE: 100 MMHG | OXYGEN SATURATION: 96 % | BODY MASS INDEX: 27.4 KG/M2 | TEMPERATURE: 98.1 F | DIASTOLIC BLOOD PRESSURE: 68 MMHG | HEIGHT: 69 IN | HEART RATE: 84 BPM

## 2020-02-19 DIAGNOSIS — J06.9 ACUTE URI: ICD-10-CM

## 2020-02-19 DIAGNOSIS — I10 ESSENTIAL HYPERTENSION: Primary | ICD-10-CM

## 2020-02-19 DIAGNOSIS — E78.2 MIXED HYPERLIPIDEMIA: ICD-10-CM

## 2020-02-19 DIAGNOSIS — Z12.5 SCREENING FOR PROSTATE CANCER: ICD-10-CM

## 2020-02-19 LAB
ALBUMIN SERPL-MCNC: 4.5 G/DL (ref 3.5–5.2)
ALBUMIN/GLOB SERPL: 1.3 G/DL
ALP SERPL-CCNC: 78 U/L (ref 39–117)
ALT SERPL W P-5'-P-CCNC: 20 U/L (ref 1–41)
ANION GAP SERPL CALCULATED.3IONS-SCNC: 12.9 MMOL/L (ref 5–15)
AST SERPL-CCNC: 23 U/L (ref 1–40)
BASOPHILS # BLD AUTO: 0.06 10*3/MM3 (ref 0–0.2)
BASOPHILS NFR BLD AUTO: 0.8 % (ref 0–1.5)
BILIRUB SERPL-MCNC: 0.8 MG/DL (ref 0.2–1.2)
BUN BLD-MCNC: 12 MG/DL (ref 8–23)
BUN/CREAT SERPL: 10.1 (ref 7–25)
CALCIUM SPEC-SCNC: 9.5 MG/DL (ref 8.6–10.5)
CHLORIDE SERPL-SCNC: 98 MMOL/L (ref 98–107)
CHOLEST SERPL-MCNC: 172 MG/DL (ref 0–200)
CO2 SERPL-SCNC: 28.1 MMOL/L (ref 22–29)
CREAT BLD-MCNC: 1.19 MG/DL (ref 0.76–1.27)
DEPRECATED RDW RBC AUTO: 36.9 FL (ref 37–54)
EOSINOPHIL # BLD AUTO: 0.2 10*3/MM3 (ref 0–0.4)
EOSINOPHIL NFR BLD AUTO: 2.8 % (ref 0.3–6.2)
ERYTHROCYTE [DISTWIDTH] IN BLOOD BY AUTOMATED COUNT: 11.4 % (ref 12.3–15.4)
GFR SERPL CREATININE-BSD FRML MDRD: 75 ML/MIN/1.73
GLOBULIN UR ELPH-MCNC: 3.6 GM/DL
GLUCOSE BLD-MCNC: 107 MG/DL (ref 65–99)
HCT VFR BLD AUTO: 39.9 % (ref 37.5–51)
HDLC SERPL-MCNC: 40 MG/DL (ref 40–60)
HGB BLD-MCNC: 14 G/DL (ref 13–17.7)
IMM GRANULOCYTES # BLD AUTO: 0.02 10*3/MM3 (ref 0–0.05)
IMM GRANULOCYTES NFR BLD AUTO: 0.3 % (ref 0–0.5)
LDLC SERPL CALC-MCNC: 111 MG/DL (ref 0–100)
LDLC/HDLC SERPL: 2.77 {RATIO}
LYMPHOCYTES # BLD AUTO: 2.38 10*3/MM3 (ref 0.7–3.1)
LYMPHOCYTES NFR BLD AUTO: 33 % (ref 19.6–45.3)
MCH RBC QN AUTO: 30.7 PG (ref 26.6–33)
MCHC RBC AUTO-ENTMCNC: 35.1 G/DL (ref 31.5–35.7)
MCV RBC AUTO: 87.5 FL (ref 79–97)
MONOCYTES # BLD AUTO: 0.99 10*3/MM3 (ref 0.1–0.9)
MONOCYTES NFR BLD AUTO: 13.7 % (ref 5–12)
NEUTROPHILS # BLD AUTO: 3.57 10*3/MM3 (ref 1.7–7)
NEUTROPHILS NFR BLD AUTO: 49.4 % (ref 42.7–76)
NRBC BLD AUTO-RTO: 0 /100 WBC (ref 0–0.2)
PLATELET # BLD AUTO: 230 10*3/MM3 (ref 140–450)
PMV BLD AUTO: 10.8 FL (ref 6–12)
POTASSIUM BLD-SCNC: 4.7 MMOL/L (ref 3.5–5.2)
PROT SERPL-MCNC: 8.1 G/DL (ref 6–8.5)
PSA SERPL-MCNC: 0.4 NG/ML (ref 0–4)
RBC # BLD AUTO: 4.56 10*6/MM3 (ref 4.14–5.8)
SODIUM BLD-SCNC: 139 MMOL/L (ref 136–145)
TRIGL SERPL-MCNC: 107 MG/DL (ref 0–150)
TSH SERPL DL<=0.05 MIU/L-ACNC: 1.93 UIU/ML (ref 0.27–4.2)
VLDLC SERPL-MCNC: 21.4 MG/DL (ref 5–40)
WBC NRBC COR # BLD: 7.22 10*3/MM3 (ref 3.4–10.8)

## 2020-02-19 PROCEDURE — 84443 ASSAY THYROID STIM HORMONE: CPT | Performed by: FAMILY MEDICINE

## 2020-02-19 PROCEDURE — 80053 COMPREHEN METABOLIC PANEL: CPT | Performed by: FAMILY MEDICINE

## 2020-02-19 PROCEDURE — G0103 PSA SCREENING: HCPCS | Performed by: FAMILY MEDICINE

## 2020-02-19 PROCEDURE — 99213 OFFICE O/P EST LOW 20 MIN: CPT | Performed by: FAMILY MEDICINE

## 2020-02-19 PROCEDURE — 80061 LIPID PANEL: CPT | Performed by: FAMILY MEDICINE

## 2020-02-19 PROCEDURE — 85025 COMPLETE CBC W/AUTO DIFF WBC: CPT | Performed by: FAMILY MEDICINE

## 2020-02-19 RX ORDER — ATORVASTATIN CALCIUM 20 MG/1
20 TABLET, FILM COATED ORAL DAILY
Qty: 90 TABLET | Refills: 3 | Status: SHIPPED | OUTPATIENT
Start: 2020-02-19 | End: 2021-02-23 | Stop reason: SDUPTHER

## 2020-02-19 RX ORDER — LISINOPRIL 20 MG/1
20 TABLET ORAL DAILY
Qty: 90 TABLET | Refills: 3 | Status: SHIPPED | OUTPATIENT
Start: 2020-02-19 | End: 2020-11-16 | Stop reason: ALTCHOICE

## 2020-02-19 RX ORDER — BENZONATATE 100 MG/1
100 CAPSULE ORAL 3 TIMES DAILY PRN
Qty: 20 CAPSULE | Refills: 1 | Status: SHIPPED | OUTPATIENT
Start: 2020-02-19 | End: 2020-06-22

## 2020-02-19 NOTE — PROGRESS NOTES
"Subjective   Mp Wen is a 64 y.o. male seen today for Hypertension; Hyperlipidemia; and Cough.     History of Present Illness   The patient is here for a follow up on Hypertension and Hyperlipidemia.    States he is doing good.  Does have some dry hacking cough that started last Thursday. Does occasionally get some sputum up but not very much.  Denies any chest pain or shortness of breath.    BP today is 100/68.  Taking Lisinopril 20 mg daily.    Taking Atorvastatin 20 mg daily for cholesterol.    The following portions of the patient's history were reviewed and updated as appropriate: allergies, current medications, past social history and problem list.    Review of Systems   Constitutional: Negative for chills, fatigue and fever.   HENT: Positive for congestion, postnasal drip, rhinorrhea and sore throat.    Respiratory: Negative for shortness of breath.    Cardiovascular: Negative for chest pain, palpitations and leg swelling.   Genitourinary: Positive for frequency and urgency. Negative for dysuria.       Objective   /68   Pulse 84   Temp 98.1 °F (36.7 °C)   Resp 15   Ht 175.3 cm (69\")   Wt 83.9 kg (185 lb)   SpO2 96%   BMI 27.32 kg/m²   Physical Exam   Constitutional: He is oriented to person, place, and time. He appears well-developed and well-nourished. No distress.   HENT:   Mouth/Throat: Oropharyngeal exudate present.   Cardiovascular: Normal rate and regular rhythm.   No murmur heard.  Pulmonary/Chest: Effort normal and breath sounds normal. He has no wheezes. He has no rales.   Neurological: He is alert and oriented to person, place, and time.   Skin: He is not diaphoretic.       Assessment/Plan   Problem List Items Addressed This Visit        Cardiovascular and Mediastinum    Hypertension - Primary      Other Visit Diagnoses     Mixed hyperlipidemia        Screening for prostate cancer        Acute URI                  Drink plenty fluids.    Continue medications as doing.  Refill " Lisinopril 20 mg and Atorvastatin 20 mg daily #90+3 on each.    Check a CBC,CMP,Lipids,PSA, and TSH. Report results by letter.    With regards to lab results, patient is instructed to call the office if they have not received test results within 2 weeks time.    Follow up as needed.            Scribed for Dr Denny Howard by Edith Javed CMA.          I, Denny Howard MD, personally performed the services described in this documentation, as scribed by Edith Javed in my presence, and is both accurate and complete.        (Please note that portions of this note were completed with a voice recognition program. Efforts were made to edit the dictations,but occasionally words are mis transcribed.)

## 2020-02-24 ENCOUNTER — CLINICAL SUPPORT (OUTPATIENT)
Dept: FAMILY MEDICINE CLINIC | Facility: CLINIC | Age: 65
End: 2020-02-24

## 2020-02-24 PROCEDURE — 90471 IMMUNIZATION ADMIN: CPT | Performed by: FAMILY MEDICINE

## 2020-02-24 PROCEDURE — 90732 PPSV23 VACC 2 YRS+ SUBQ/IM: CPT | Performed by: FAMILY MEDICINE

## 2020-06-22 ENCOUNTER — HOSPITAL ENCOUNTER (EMERGENCY)
Facility: HOSPITAL | Age: 65
Discharge: HOME | End: 2020-06-22
Attending: EMERGENCY MEDICINE | Admitting: EMERGENCY MEDICINE

## 2020-06-22 ENCOUNTER — APPOINTMENT (OUTPATIENT)
Dept: CT IMAGING | Facility: HOSPITAL | Age: 65
End: 2020-06-22

## 2020-06-22 ENCOUNTER — OFFICE VISIT (OUTPATIENT)
Dept: FAMILY MEDICINE CLINIC | Facility: CLINIC | Age: 65
End: 2020-06-22

## 2020-06-22 VITALS
WEIGHT: 190 LBS | HEIGHT: 70 IN | HEART RATE: 68 BPM | SYSTOLIC BLOOD PRESSURE: 133 MMHG | OXYGEN SATURATION: 97 % | DIASTOLIC BLOOD PRESSURE: 97 MMHG | TEMPERATURE: 98.2 F | BODY MASS INDEX: 27.2 KG/M2 | RESPIRATION RATE: 16 BRPM

## 2020-06-22 VITALS
BODY MASS INDEX: 27.55 KG/M2 | SYSTOLIC BLOOD PRESSURE: 124 MMHG | TEMPERATURE: 97.8 F | OXYGEN SATURATION: 96 % | WEIGHT: 186 LBS | HEIGHT: 69 IN | HEART RATE: 90 BPM | RESPIRATION RATE: 12 BRPM | DIASTOLIC BLOOD PRESSURE: 80 MMHG

## 2020-06-22 DIAGNOSIS — R55 SYNCOPE, UNSPECIFIED SYNCOPE TYPE: Primary | ICD-10-CM

## 2020-06-22 DIAGNOSIS — R94.31 ABNORMAL ELECTROCARDIOGRAM: Primary | ICD-10-CM

## 2020-06-22 DIAGNOSIS — R55 SYNCOPE AND COLLAPSE: ICD-10-CM

## 2020-06-22 DIAGNOSIS — R73.09 ELEVATED GLUCOSE: ICD-10-CM

## 2020-06-22 DIAGNOSIS — R55 SYNCOPE, UNSPECIFIED SYNCOPE TYPE: ICD-10-CM

## 2020-06-22 PROBLEM — E78.2 MIXED HYPERLIPIDEMIA: Status: ACTIVE | Noted: 2020-06-22

## 2020-06-22 PROBLEM — R00.2 PALPITATIONS: Status: ACTIVE | Noted: 2020-06-22

## 2020-06-22 LAB
ALBUMIN SERPL-MCNC: 4.6 G/DL (ref 3.5–5.2)
ALBUMIN/GLOB SERPL: 1.2 G/DL
ALP SERPL-CCNC: 63 U/L (ref 39–117)
ALT SERPL W P-5'-P-CCNC: 20 U/L (ref 1–41)
ANION GAP SERPL CALCULATED.3IONS-SCNC: 12 MMOL/L (ref 5–15)
AST SERPL-CCNC: 20 U/L (ref 1–40)
BASOPHILS # BLD AUTO: 0.04 10*3/MM3 (ref 0–0.2)
BASOPHILS NFR BLD AUTO: 0.7 % (ref 0–1.5)
BILIRUB SERPL-MCNC: 0.7 MG/DL (ref 0.2–1.2)
BUN BLD-MCNC: 19 MG/DL (ref 8–23)
BUN/CREAT SERPL: 13 (ref 7–25)
CALCIUM SPEC-SCNC: 10 MG/DL (ref 8.6–10.5)
CHLORIDE SERPL-SCNC: 99 MMOL/L (ref 98–107)
CO2 SERPL-SCNC: 25 MMOL/L (ref 22–29)
CREAT BLD-MCNC: 1.46 MG/DL (ref 0.76–1.27)
D DIMER PPP FEU-MCNC: 0.38 MCGFEU/ML (ref 0–0.56)
DEPRECATED RDW RBC AUTO: 41.2 FL (ref 37–54)
EOSINOPHIL # BLD AUTO: 0.07 10*3/MM3 (ref 0–0.4)
EOSINOPHIL NFR BLD AUTO: 1.1 % (ref 0.3–6.2)
ERYTHROCYTE [DISTWIDTH] IN BLOOD BY AUTOMATED COUNT: 12.4 % (ref 12.3–15.4)
GFR SERPL CREATININE-BSD FRML MDRD: 59 ML/MIN/1.73
GLOBULIN UR ELPH-MCNC: 3.7 GM/DL
GLUCOSE BLD-MCNC: 103 MG/DL (ref 65–99)
GLUCOSE BLDC GLUCOMTR-MCNC: 111 MG/DL (ref 70–130)
GLUCOSE BLDC GLUCOMTR-MCNC: 136 MG/DL (ref 70–130)
HBA1C MFR BLD: 6.2 %
HCT VFR BLD AUTO: 41.9 % (ref 37.5–51)
HGB BLD-MCNC: 13.6 G/DL (ref 13–17.7)
HGB BLDA-MCNC: 13.1 G/DL (ref 12–17)
HOLD SPECIMEN: NORMAL
HOLD SPECIMEN: NORMAL
IMM GRANULOCYTES # BLD AUTO: 0.01 10*3/MM3 (ref 0–0.05)
IMM GRANULOCYTES NFR BLD AUTO: 0.2 % (ref 0–0.5)
LYMPHOCYTES # BLD AUTO: 1.88 10*3/MM3 (ref 0.7–3.1)
LYMPHOCYTES NFR BLD AUTO: 30.7 % (ref 19.6–45.3)
MAGNESIUM SERPL-MCNC: 2.3 MG/DL (ref 1.6–2.4)
MCH RBC QN AUTO: 29.7 PG (ref 26.6–33)
MCHC RBC AUTO-ENTMCNC: 32.5 G/DL (ref 31.5–35.7)
MCV RBC AUTO: 91.5 FL (ref 79–97)
MONOCYTES # BLD AUTO: 0.59 10*3/MM3 (ref 0.1–0.9)
MONOCYTES NFR BLD AUTO: 9.6 % (ref 5–12)
NEUTROPHILS # BLD AUTO: 3.53 10*3/MM3 (ref 1.7–7)
NEUTROPHILS NFR BLD AUTO: 57.7 % (ref 42.7–76)
NRBC BLD AUTO-RTO: 0 /100 WBC (ref 0–0.2)
PLATELET # BLD AUTO: 235 10*3/MM3 (ref 140–450)
PMV BLD AUTO: 9.9 FL (ref 6–12)
POTASSIUM BLD-SCNC: 5 MMOL/L (ref 3.5–5.2)
PROT SERPL-MCNC: 8.3 G/DL (ref 6–8.5)
RBC # BLD AUTO: 4.58 10*6/MM3 (ref 4.14–5.8)
SODIUM BLD-SCNC: 136 MMOL/L (ref 136–145)
TROPONIN T SERPL-MCNC: <0.01 NG/ML (ref 0–0.03)
WBC NRBC COR # BLD: 6.12 10*3/MM3 (ref 3.4–10.8)
WHOLE BLOOD HOLD SPECIMEN: NORMAL
WHOLE BLOOD HOLD SPECIMEN: NORMAL

## 2020-06-22 PROCEDURE — 84484 ASSAY OF TROPONIN QUANT: CPT | Performed by: EMERGENCY MEDICINE

## 2020-06-22 PROCEDURE — 99283 EMERGENCY DEPT VISIT LOW MDM: CPT | Performed by: INTERNAL MEDICINE

## 2020-06-22 PROCEDURE — 85025 COMPLETE CBC W/AUTO DIFF WBC: CPT | Performed by: EMERGENCY MEDICINE

## 2020-06-22 PROCEDURE — 93005 ELECTROCARDIOGRAM TRACING: CPT

## 2020-06-22 PROCEDURE — 93000 ELECTROCARDIOGRAM COMPLETE: CPT | Performed by: FAMILY MEDICINE

## 2020-06-22 PROCEDURE — 85018 HEMOGLOBIN: CPT | Performed by: FAMILY MEDICINE

## 2020-06-22 PROCEDURE — 80053 COMPREHEN METABOLIC PANEL: CPT | Performed by: EMERGENCY MEDICINE

## 2020-06-22 PROCEDURE — 93005 ELECTROCARDIOGRAM TRACING: CPT | Performed by: EMERGENCY MEDICINE

## 2020-06-22 PROCEDURE — 85379 FIBRIN DEGRADATION QUANT: CPT | Performed by: NURSE PRACTITIONER

## 2020-06-22 PROCEDURE — 71275 CT ANGIOGRAPHY CHEST: CPT

## 2020-06-22 PROCEDURE — 83036 HEMOGLOBIN GLYCOSYLATED A1C: CPT | Performed by: FAMILY MEDICINE

## 2020-06-22 PROCEDURE — 70450 CT HEAD/BRAIN W/O DYE: CPT

## 2020-06-22 PROCEDURE — 82962 GLUCOSE BLOOD TEST: CPT | Performed by: FAMILY MEDICINE

## 2020-06-22 PROCEDURE — 82962 GLUCOSE BLOOD TEST: CPT

## 2020-06-22 PROCEDURE — 99285 EMERGENCY DEPT VISIT HI MDM: CPT

## 2020-06-22 PROCEDURE — 0 IOPAMIDOL PER 1 ML: Performed by: EMERGENCY MEDICINE

## 2020-06-22 PROCEDURE — 83735 ASSAY OF MAGNESIUM: CPT | Performed by: EMERGENCY MEDICINE

## 2020-06-22 PROCEDURE — 99214 OFFICE O/P EST MOD 30 MIN: CPT | Performed by: FAMILY MEDICINE

## 2020-06-22 RX ORDER — MELATONIN
1000 DAILY
COMMUNITY

## 2020-06-22 RX ORDER — SODIUM CHLORIDE 0.9 % (FLUSH) 0.9 %
10 SYRINGE (ML) INJECTION AS NEEDED
Status: DISCONTINUED | OUTPATIENT
Start: 2020-06-22 | End: 2020-06-22 | Stop reason: HOSPADM

## 2020-06-22 RX ADMIN — SODIUM CHLORIDE 1000 ML: 9 INJECTION, SOLUTION INTRAVENOUS at 14:44

## 2020-06-22 RX ADMIN — IOPAMIDOL 64 ML: 755 INJECTION, SOLUTION INTRAVENOUS at 17:24

## 2020-06-22 NOTE — CONSULTS
"Cardiology (on-call MD unless specified)  Consult performed by: Fay Smith APRN  Consult ordered by: Abe Juares MD        [facility]  Cardiovascular Consultation Note    Reason for consult: Syncope       Patient is a 65-year-old male with a history of hepatitis C, hypertension, GERD and benign positional vertigo who presented to [facility] emergency room earlier today at the request of his primary care provider.  Yesterday while sitting outside on his deck celebrating Father's Day with his family he had a sudden onset of feeling weak all over as if the blood was draining from his body and feeling very nauseated.  His head went backwards and his eyes rolled back in his head as he lost consciousness.  According to the wife the patient was out for only a few minutes as family was able to arouse him. Once awake he was able to ambulate into his house where he lied down for approximately an hour. After lying down he felt back to his normal self however his wife thinks he has been slower than normal and seems more agitated.  He followed up with his PCP today who recommended he be evaluated in the emergency room.  According to the patient he has never suffered a syncopal spell before and this episode was nothing like his vertigo spells.  The patient denies smoking cigarettes or use of any illicit drugs.  He does report drinking 1 beer yesterday prior to his episode.  Troponin is negative and EKG shows bifascicular block but unchanged from prior EKG. Stress testing performed in 2015 showed no evidence of ischemia and a normal LVEF.  He denies any chest pain/pressure or tightness, increased dyspnea, or orthopnea,.  He does report having palpitations and feeling \"fluttering\" over the past several months.  He is not symptomatic when the palpitations occur.  The palpitations may occur monthly but are brief lasting only a few seconds.  A CT of the chest and " had been ordered with results pending.      Cardiac risk factors: Advanced age, male gender, hypertension    Past medical and surgical history, social and family history reviewed in EMR.    REVIEW OF SYSTEMS:   H&P ROS reviewed and pertinent CV ROS as noted in HPI.         Vital Sign Min/Max for last 24 hours  Temp  Min: 97.8 °F (36.6 °C)  Max: 98.2 °F (36.8 °C)   BP  Min: 114/79  Max: 142/86   Pulse  Min: 69  Max: 90   Resp  Min: 12  Max: 16   SpO2  Min: 94 %  Max: 99 %   No data recorded    No intake or output data in the 24 hours ending 20 1558        Physical Exam   Constitutional: He is oriented to person, place, and time. He appears well-developed and well-nourished.   HENT:   Head: Normocephalic.   Neck: No JVD present. Carotid bruit is not present.   Cardiovascular: Normal rate, regular rhythm and normal heart sounds. Exam reveals no gallop and no friction rub.   No murmur heard.  Pulmonary/Chest: Effort normal and breath sounds normal.   Abdominal: Soft. Bowel sounds are normal. He exhibits no distension.   Neurological: He is alert and oriented to person, place, and time.   Skin: Skin is warm and dry.   Psychiatric: He has a normal mood and affect.         EK2020 normal sinus rhythm with bifascicular block (right bundle branch block, left anterior fascicular block).  EKG unchanged from EKG     Lab Review:   Labs reviewed in the electronic medical record.  Pertinent findings include:  Lab Results   Component Value Date    GLUCOSE 103 (H) 2020    BUN 19 2020    CREATININE 1.46 (H) 2020    EGFRIFAFRI 59 (L) 2020    BCR 13.0 2020    K 5.0 2020    CO2 25.0 2020    CALCIUM 10.0 2020    ALBUMIN 4.60 2020    AST 20 2020    ALT 20 2020     Lab Results   Component Value Date    WBC 6.12 2020    HGB 13.6 2020    HCT 41.9 2020    MCV 91.5 2020     2020     Lab Results   Component Value Date    CHOL  172 02/19/2020    CHLPL 223 (H) 04/13/2016    TRIG 107 02/19/2020    HDL 40 02/19/2020     (H) 02/19/2020     Results from last 7 days   Lab Units 06/22/20  1228   HEMOGLOBIN A1C % 6.2            Active Hospital Problems    Diagnosis   • **Syncope and collapse     · Presentation [facility] ED on 6/22/2020 with reports of a syncopal spell with loss of consciousness 6/21/2020.  Troponin negative and EKG unchanged from prior EKG 2015     • Palpitations     · Reports several month history of fluttering for which she is asymptomatic.  Episodes are brief     • Abnormal electrocardiogram     · MPS (2/10/2015): No evidence of ischemia.  Normal LVEF.  · EKG 6/22/2020 bifascicular block but unchanged from prior EKG in 2015     • Essential hypertension   • Mixed hyperlipidemia       Assessment and recommendations:    Episode of syncope  · Experienced one episode 6/21/2020 with a prodrome of weakness and nausea while he was sitting with family on his deck having a beer. No further episodes since.  His orthostasis are negative and EKG although abnormal it is identical to his ECG from 2015 which was recorded during the stress test.  · We will place 1 week ZIO monitor.  · Schedule outpatient stress testing and echocardiogram for further assessment  · Differential diagnosis includes TIA and PE and if his CT scans of the head and CTA of the chest are negative he can be discharged to home and we will also consider an outpatient carotid ultrasound.  · Follow-up in the office in 3 weeks with Dr. Juan Carlos Ramon    Palpitations  · Reports several month history of nonspecific fluttering  · Place 1 week ZIO monitor    Hypertension  · Blood pressure currently 121/81  · Creatinine mildly elevated at 1.4  · Recommend outpatient follow-up with PCP.  Good hydration and avoidance of caffeine recommended.    Abnormal EKG/bifascicular block  · No signs or symptoms of angina or heart failure  · EKG unchanged from prior EKG 2015,  will proceed to outpatient stress test and echocardiogram.    Hyperlipidemia  · Lipid panel 2/2020  but total cholesterol normal 172  · Continue Lipitor, monitored by PCP.      Fay Smith APRN, CCRN scribe for dawood Lopez MD, personally performed the services described in this documentation as scribed by the above named individual in my presence, and it is both accurate and complete.  6/22/2020  16:54

## 2020-06-22 NOTE — DISCHARGE INSTRUCTIONS
Follow-up outpatient with cardiology clinic for stress test and further evaluation.    Rest and drink plenty of fluids.    Return to the ER with any further concern.

## 2020-06-22 NOTE — PROGRESS NOTES
Subjective   Mp Wen is a 65 y.o. male seen today for Syncope.     Syncope   This is a new problem. The current episode started yesterday (had a weird feeling and blacked out and when he came to himself he could here people talking but could not respond to them.). Episode frequency: Once. Length of episode of loss of consciousness: not sure how long he was out. The symptoms are aggravated by normal activity. Associated symptoms include malaise/fatigue and nausea. Pertinent negatives include no chest pain, diaphoresis, fever, headaches, palpitations, slurred speech or vomiting. Associated symptoms comments: Brother in law stated his mouth looked twisted.  Occasional ache in the left upper chest, about once a week and lasts for about a minute.. He has tried bed rest for the symptoms. The treatment provided mild relief.      At the present time, the patient denies any chest pain, shortness of breath, dizziness, diaphoresis or nausea.    The patient has a history of hypertension for which he takes lisinopril 20 mg a day as well as hyperlipidemia for which he takes atorvastatin 20 mg a day.  He also is on aspirin 81 mg a day.  There is no history of diabetes.  The patient does not use tobacco.    BP today is 124/80. 110/70 sitting and 125/80 standing on recheck.  Taking Lisinopril 20 mg daily.    In office glucose is 136 and Hgb is 13.1.  Hemoglobin A1c today is 6.2%.    The following portions of the patient's history were reviewed and updated as appropriate: allergies, current medications, past social history and problem list.    Review of Systems   Constitutional: Positive for malaise/fatigue. Negative for diaphoresis and fever.   Respiratory: Negative for shortness of breath.    Cardiovascular: Positive for syncope. Negative for chest pain and palpitations.   Gastrointestinal: Positive for nausea. Negative for vomiting.   Neurological: Negative for headaches.       Objective   /80   Pulse 90   Temp 97.8  "°F (36.6 °C)   Resp 12   Ht 175.3 cm (69\")   Wt 84.4 kg (186 lb)   SpO2 96%   BMI 27.47 kg/m²   Physical Exam   Constitutional: He is oriented to person, place, and time. He appears well-developed and well-nourished. No distress.   HENT:   Mouth/Throat: Oropharynx is clear and moist.   Eyes: Pupils are equal, round, and reactive to light. Conjunctivae are normal.   Neck: Normal range of motion. No thyromegaly present.   Cardiovascular: Normal rate and regular rhythm.   No murmur heard.  Pulmonary/Chest: Effort normal and breath sounds normal. He has no wheezes. He has no rales.   Neurological: He is alert and oriented to person, place, and time.   Skin: He is not diaphoretic.   Nursing note and vitals reviewed.      ECG 12 Lead  Date/Time: 6/22/2020 11:44 AM  Performed by: Denny Howard MD  Authorized by: Denny Howard MD   Comparison: compared with previous ECG from 2/10/2015  Comparison to previous ECG: The tracing in 2015 revealed a right bundle branch block.  However there is now 2 mm of ST elevation in lead V2 compared to that tracing.  There also have been T wave changes in V1, V4 and V5.  Also in some of the limb leads.  Rhythm: sinus rhythm  Rate: normal  BPM: 90  Conduction: right bundle branch block  ST Elevation: V2  T elevation: V1  T inversion: V2 and V3  QRS axis: normal    Clinical impression: abnormal EKG  Comments: Compared to the EKG tracing of 2015, there is persistent right bundle branch block but also the development of ST elevation in lead V2 with T wave changes in several leads.            Assessment/Plan   Problem List Items Addressed This Visit     None      Visit Diagnoses     Syncope, unspecified syncope type    -  Primary    Elevated glucose          65-year-old -American male who developed syncope for a short period of time.  This may have lasted as long as a minute.  The patient has had no chest pain or shortness of breath either before or after the event including " at present.  He has had some mild ST and T wave changes on his tracing today.  He also has developed some further elevation of his blood glucose qualifying for diagnosis of type 2 diabetes.  He has a history of other risk factors.  His case was discussed with Dr. Fredis Luo with cardiology.  We will send the patient to the emergency room to rule out acute myocardial infarction as well as to evaluate for TIA.        Consult with Cardiology Dr Ramon.    Follow up as needed.      Scribed for Dr Denny Howard by Edith Javed CMA.          I, Denny Howard MD, personally performed the services described in this documentation, as scribed by Edith Javed in my presence, and is both accurate and complete.        (Please note that portions of this note were completed with a voice recognition program. Efforts were made to edit the dictations,but occasionally words are mis transcribed.)

## 2020-06-22 NOTE — ED PROVIDER NOTES
Subjective   65-year-old male presents for evaluation of syncope and EKG abnormality.  The patient reports that he was celebrating with his family yesterday on his back porch when he had a syncopal episode.  He reports that he has a sister who is currently in the process of being placed in hospice care, and recently lost another sister due to severe COPD.  He reports he was talking with his sister and his brother-in-law and the next thing he knows he awoke with multiple concerned family were standing around him.  He was ultimately able to regain consciousness and went into his bedroom to lay down and felt slightly better.  He followed up for evaluation with his primary care physician this morning,  Dr. Howard, who sent the patient for evaluation of syncope and EKG abnormality.  EKG shows ST elevation in V2 and ST depression and T wave inversion in the lateral leads.  He denies any current chest pain.  He denies any current lightheadedness or dizziness.  No report abdominal pain, no nausea or vomiting, no diarrhea.  He reports his oral fluid intake has been normal.  He denies any infectious symptoms include no fever, bodies, or chills.  He denies any respiratory symptoms including no cough, chest pain, shortness of breath, rhinorrhea, sore throat, change in sense of taste or smell.  No other reported aggravating, alleviating, or associated symptoms.          Review of Systems   Constitutional: Negative for chills, fatigue and fever.   HENT: Negative for congestion, ear pain, postnasal drip, sinus pressure and sore throat.    Eyes: Negative for pain, redness and visual disturbance.   Respiratory: Negative for cough, chest tightness and shortness of breath.    Cardiovascular: Negative for chest pain, palpitations and leg swelling.   Gastrointestinal: Negative for abdominal pain, anal bleeding, blood in stool, diarrhea, nausea and vomiting.   Endocrine: Negative for polydipsia and polyuria.   Genitourinary: Negative for  difficulty urinating, dysuria, frequency and urgency.   Musculoskeletal: Negative for arthralgias, back pain and neck pain.   Skin: Negative for pallor and rash.   Allergic/Immunologic: Negative for environmental allergies and immunocompromised state.   Neurological: Positive for light-headedness. Negative for dizziness, weakness and headaches.   Hematological: Negative for adenopathy.   Psychiatric/Behavioral: Negative for confusion, self-injury and suicidal ideas. The patient is not nervous/anxious.    All other systems reviewed and are negative.      Past Medical History:   Diagnosis Date   • Abnormal EKG    • Acute bacterial prostatitis    • Benign prostatic hypertrophy    • ED (erectile dysfunction) of non-organic origin    • Esophageal reflux    • Hepatitis C    • Hypertension    • Labyrinthitis    • Retinal detachment    • Retinal detachment    • Vertigo        No Known Allergies    Past Surgical History:   Procedure Laterality Date   • HERNIA REPAIR         Family History   Problem Relation Age of Onset   • Coronary artery disease Mother    • Prostate cancer Father    • No Known Problems Paternal Grandmother        Social History     Socioeconomic History   • Marital status:      Spouse name: Not on file   • Number of children: Not on file   • Years of education: Not on file   • Highest education level: Not on file   Occupational History   • 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 He appears well-developed and well-nourished.  Non-toxic appearance. No distress.   HENT:   Head: Normocephalic and atraumatic.   Right Ear: External ear normal.   Left Ear:  External ear normal.   Nose: Nose normal.   Eyes: Pupils are equal, round, and reactive to light. EOM and lids are normal.   Neck: Normal range of motion. Neck supple. No tracheal deviation present.   Cardiovascular: Normal rate, regular rhythm and normal heart sounds. Exam reveals no gallop, no friction rub and no decreased pulses.   No murmur heard.  Pulmonary/Chest: Effort normal and breath sounds normal. No respiratory distress. He has no decreased breath sounds. He has no wheezes. He has no rhonchi. He has no rales.   Abdominal: Soft. Normal appearance and bowel sounds are normal. There is no tenderness. There is no rebound and no guarding.   Musculoskeletal: Normal range of motion. He exhibits no deformity.   Lymphadenopathy:     He has no cervical adenopathy.   Neurological: He is alert and oriented to person, place, and time. He has normal strength. No cranial nerve deficit or sensory deficit.   Skin: Skin is warm and dry. No rash noted. He is not diaphoretic.   Psychiatric: He has a normal mood and affect. His speech is normal and behavior is normal. Judgment and thought content normal. Cognition and memory are normal.   Nursing note and vitals reviewed.      Procedures           ED Course                                           MDM  Number of Diagnoses or Management Options  Syncope, unspecified syncope type: new and requires workup  Diagnosis management comments: No significant lab abnormalities the patient vital signs remained normal throughout the ER course.    Troponin was normal, d-dimer within normal limits.    The patient was evaluated by Dr. Ramon of the cardiology service and the patient will be set up for outpatient stress test.  Dr. Ramon was able to identify an EKG from a previous stress test within the last 5 years and reports that todays EKG is unchanged compared to his previous baseline.    CT scan of the head and chest pending.    A CT scan of the head and chest are normal the patient  will be discharged home with outpatient follow-up with cardiology service.    The patient has been made aware of this and is agreeable with this plan.       Amount and/or Complexity of Data Reviewed  Clinical lab tests: ordered and reviewed  Tests in the radiology section of CPT®: ordered  Decide to obtain previous medical records or to obtain history from someone other than the patient: yes  Obtain history from someone other than the patient: yes  Review and summarize past medical records: yes  Discuss the patient with other providers: yes  Independent visualization of images, tracings, or specimens: yes        Final diagnoses:   Syncope, unspecified syncope type            Abe Juares MD  06/27/20 2360

## 2020-07-14 DIAGNOSIS — R00.2 PALPITATIONS: Primary | ICD-10-CM

## 2020-07-15 ENCOUNTER — TELEPHONE (OUTPATIENT)
Dept: CARDIOLOGY | Facility: CLINIC | Age: 65
End: 2020-07-15

## 2020-07-15 NOTE — TELEPHONE ENCOUNTER
Called and informed pt of holter results. Encouraged pt to continue current treatment and keep scheduled apt for follow up. Pt verbalized understanding and is agreeable.

## 2020-07-29 ENCOUNTER — HOSPITAL ENCOUNTER (OUTPATIENT)
Dept: CARDIOLOGY | Facility: HOSPITAL | Age: 65
Discharge: HOME | End: 2020-07-29

## 2020-07-29 ENCOUNTER — HOSPITAL ENCOUNTER (OUTPATIENT)
Dept: CARDIOLOGY | Facility: HOSPITAL | Age: 65
Discharge: HOME | End: 2020-07-29
Admitting: NURSE PRACTITIONER

## 2020-07-29 VITALS
HEART RATE: 77 BPM | HEIGHT: 70 IN | RESPIRATION RATE: 18 BRPM | WEIGHT: 190 LBS | OXYGEN SATURATION: 98 % | BODY MASS INDEX: 27.2 KG/M2 | DIASTOLIC BLOOD PRESSURE: 88 MMHG | SYSTOLIC BLOOD PRESSURE: 120 MMHG

## 2020-07-29 DIAGNOSIS — R55 SYNCOPE AND COLLAPSE: ICD-10-CM

## 2020-07-29 DIAGNOSIS — R94.31 ABNORMAL ELECTROCARDIOGRAM: ICD-10-CM

## 2020-07-29 PROCEDURE — A9500 TC99M SESTAMIBI: HCPCS | Performed by: NURSE PRACTITIONER

## 2020-07-29 PROCEDURE — 93306 TTE W/DOPPLER COMPLETE: CPT

## 2020-07-29 PROCEDURE — 78452 HT MUSCLE IMAGE SPECT MULT: CPT | Performed by: INTERNAL MEDICINE

## 2020-07-29 PROCEDURE — 93017 CV STRESS TEST TRACING ONLY: CPT

## 2020-07-29 PROCEDURE — 93018 CV STRESS TEST I&R ONLY: CPT | Performed by: INTERNAL MEDICINE

## 2020-07-29 PROCEDURE — 93306 TTE W/DOPPLER COMPLETE: CPT | Performed by: INTERNAL MEDICINE

## 2020-07-29 PROCEDURE — 78452 HT MUSCLE IMAGE SPECT MULT: CPT

## 2020-07-29 PROCEDURE — 0 TECHNETIUM SESTAMIBI: Performed by: NURSE PRACTITIONER

## 2020-07-29 RX ADMIN — TECHNETIUM TC 99M SESTAMIBI 1 DOSE: 1 INJECTION INTRAVENOUS at 11:10

## 2020-07-29 RX ADMIN — TECHNETIUM TC 99M SESTAMIBI 1 DOSE: 1 INJECTION INTRAVENOUS at 13:45

## 2020-07-30 LAB
BH CV ECHO MEAS - AO MAX PG (FULL): 2 MMHG
BH CV ECHO MEAS - AO MAX PG: 4.7 MMHG
BH CV ECHO MEAS - AO MEAN PG (FULL): 1.2 MMHG
BH CV ECHO MEAS - AO MEAN PG: 2.4 MMHG
BH CV ECHO MEAS - AO ROOT AREA (BSA CORRECTED): 1.4
BH CV ECHO MEAS - AO ROOT AREA: 5.9 CM^2
BH CV ECHO MEAS - AO ROOT DIAM: 2.7 CM
BH CV ECHO MEAS - AO V2 MAX: 107.9 CM/SEC
BH CV ECHO MEAS - AO V2 MEAN: 71.2 CM/SEC
BH CV ECHO MEAS - AO V2 VTI: 18 CM
BH CV ECHO MEAS - AVA(I,A): 2.7 CM^2
BH CV ECHO MEAS - AVA(I,D): 2.7 CM^2
BH CV ECHO MEAS - AVA(V,A): 2.4 CM^2
BH CV ECHO MEAS - AVA(V,D): 2.4 CM^2
BH CV ECHO MEAS - BSA(HAYCOCK): 2.1 M^2
BH CV ECHO MEAS - BSA: 2 M^2
BH CV ECHO MEAS - BZI_BMI: 28.1 KILOGRAMS/M^2
BH CV ECHO MEAS - BZI_METRIC_HEIGHT: 175.3 CM
BH CV ECHO MEAS - BZI_METRIC_WEIGHT: 86.2 KG
BH CV ECHO MEAS - EDV(CUBED): 50.1 ML
BH CV ECHO MEAS - EDV(MOD-SP2): 40 ML
BH CV ECHO MEAS - EDV(MOD-SP4): 57 ML
BH CV ECHO MEAS - EDV(TEICH): 57.6 ML
BH CV ECHO MEAS - EF(CUBED): 72.2 %
BH CV ECHO MEAS - EF(MOD-BP): 56 %
BH CV ECHO MEAS - EF(MOD-SP2): 52.5 %
BH CV ECHO MEAS - EF(MOD-SP4): 54.4 %
BH CV ECHO MEAS - EF(TEICH): 64.7 %
BH CV ECHO MEAS - ESV(CUBED): 13.9 ML
BH CV ECHO MEAS - ESV(MOD-SP2): 19 ML
BH CV ECHO MEAS - ESV(MOD-SP4): 26 ML
BH CV ECHO MEAS - ESV(TEICH): 20.3 ML
BH CV ECHO MEAS - FS: 34.7 %
BH CV ECHO MEAS - IVS/LVPW: 0.93
BH CV ECHO MEAS - IVSD: 0.95 CM
BH CV ECHO MEAS - LA DIMENSION: 2.6 CM
BH CV ECHO MEAS - LA/AO: 0.96
BH CV ECHO MEAS - LAD MAJOR: 3.5 CM
BH CV ECHO MEAS - LAT PEAK E' VEL: 12.1 CM/SEC
BH CV ECHO MEAS - LATERAL E/E' RATIO: 3.8
BH CV ECHO MEAS - LV DIASTOLIC VOL/BSA (35-75): 28.2 ML/M^2
BH CV ECHO MEAS - LV MASS(C)D: 109.4 GRAMS
BH CV ECHO MEAS - LV MASS(C)DI: 54.1 GRAMS/M^2
BH CV ECHO MEAS - LV MAX PG: 2.7 MMHG
BH CV ECHO MEAS - LV MEAN PG: 1.2 MMHG
BH CV ECHO MEAS - LV SYSTOLIC VOL/BSA (12-30): 12.9 ML/M^2
BH CV ECHO MEAS - LV V1 MAX: 81.4 CM/SEC
BH CV ECHO MEAS - LV V1 MEAN: 48.2 CM/SEC
BH CV ECHO MEAS - LV V1 VTI: 15.6 CM
BH CV ECHO MEAS - LVIDD: 3.7 CM
BH CV ECHO MEAS - LVIDS: 2.4 CM
BH CV ECHO MEAS - LVLD AP2: 6.4 CM
BH CV ECHO MEAS - LVLD AP4: 5.8 CM
BH CV ECHO MEAS - LVLS AP2: 4.8 CM
BH CV ECHO MEAS - LVLS AP4: 4.4 CM
BH CV ECHO MEAS - LVOT AREA (M): 3.1 CM^2
BH CV ECHO MEAS - LVOT AREA: 3.1 CM^2
BH CV ECHO MEAS - LVOT DIAM: 2 CM
BH CV ECHO MEAS - LVPWD: 1 CM
BH CV ECHO MEAS - MED PEAK E' VEL: 7.8 CM/SEC
BH CV ECHO MEAS - MEDIAL E/E' RATIO: 5.9
BH CV ECHO MEAS - MV A MAX VEL: 89.3 CM/SEC
BH CV ECHO MEAS - MV DEC SLOPE: 340.8 CM/SEC^2
BH CV ECHO MEAS - MV DEC TIME: 0.21 SEC
BH CV ECHO MEAS - MV E MAX VEL: 47.4 CM/SEC
BH CV ECHO MEAS - MV E/A: 0.53
BH CV ECHO MEAS - MV P1/2T MAX VEL: 62.4 CM/SEC
BH CV ECHO MEAS - MV P1/2T: 53.7 MSEC
BH CV ECHO MEAS - MVA P1/2T LCG: 3.5 CM^2
BH CV ECHO MEAS - MVA(P1/2T): 4.1 CM^2
BH CV ECHO MEAS - PA ACC SLOPE: 568.3 CM/SEC^2
BH CV ECHO MEAS - PA ACC TIME: 0.13 SEC
BH CV ECHO MEAS - PA PR(ACCEL): 20.4 MMHG
BH CV ECHO MEAS - RAP SYSTOLE: 3 MMHG
BH CV ECHO MEAS - RV MAX PG: 1.5 MMHG
BH CV ECHO MEAS - RV V1 MAX: 61.7 CM/SEC
BH CV ECHO MEAS - RVSP: 16 MMHG
BH CV ECHO MEAS - SI(AO): 52.9 ML/M^2
BH CV ECHO MEAS - SI(CUBED): 17.9 ML/M^2
BH CV ECHO MEAS - SI(LVOT): 24.3 ML/M^2
BH CV ECHO MEAS - SI(MOD-SP2): 10.4 ML/M^2
BH CV ECHO MEAS - SI(MOD-SP4): 15.3 ML/M^2
BH CV ECHO MEAS - SI(TEICH): 18.4 ML/M^2
BH CV ECHO MEAS - SV(AO): 106.9 ML
BH CV ECHO MEAS - SV(CUBED): 36.1 ML
BH CV ECHO MEAS - SV(LVOT): 49 ML
BH CV ECHO MEAS - SV(MOD-SP2): 21 ML
BH CV ECHO MEAS - SV(MOD-SP4): 31 ML
BH CV ECHO MEAS - SV(TEICH): 37.3 ML
BH CV ECHO MEAS - TAPSE (>1.6): 2.3 CM2
BH CV ECHO MEAS - TR MAX PG: 13 MMHG
BH CV ECHO MEAS - TR MAX VEL: 177.8 CM/SEC
BH CV ECHO MEASUREMENTS AVERAGE E/E' RATIO: 4.76
BH CV STRESS BP STAGE 1: NORMAL
BH CV STRESS BP STAGE 2: NORMAL
BH CV STRESS DURATION MIN STAGE 1: 3
BH CV STRESS DURATION MIN STAGE 2: 2
BH CV STRESS DURATION SEC STAGE 2: 29
BH CV STRESS GRADE STAGE 1: 10
BH CV STRESS GRADE STAGE 2: 12
BH CV STRESS HR STAGE 1: 133
BH CV STRESS HR STAGE 2: 148
BH CV STRESS METS STAGE 1: 5
BH CV STRESS METS STAGE 2: 7.5
BH CV STRESS O2 STAGE 1: 95
BH CV STRESS O2 STAGE 2: 97
BH CV STRESS PROTOCOL 1: NORMAL
BH CV STRESS RECOVERY BP: NORMAL MMHG
BH CV STRESS RECOVERY HR: 97 BPM
BH CV STRESS RECOVERY O2: 100 %
BH CV STRESS SPEED STAGE 1: 1.7
BH CV STRESS SPEED STAGE 2: 2.5
BH CV STRESS STAGE 1: 1
BH CV STRESS STAGE 2: 2
BH CV XLRA - RV BASE: 3.3 CM
BH CV XLRA - RV LENGTH: 5.4 CM
BH CV XLRA - RV MID: 3.1 CM
BH CV XLRA - TDI S': 10.7 CM/SEC
LEFT ATRIUM VOLUME INDEX: 10.4 ML/M^2
LEFT ATRIUM VOLUME: 21 ML
LV EF 2D ECHO EST: 60 %
LV EF NUC BP: 73 %
MAXIMAL PREDICTED HEART RATE: 155 BPM
MAXIMAL PREDICTED HEART RATE: 155 BPM
PERCENT MAX PREDICTED HR: 96.77 %
STRESS BASELINE BP: NORMAL MMHG
STRESS BASELINE HR: 70 BPM
STRESS O2 SAT REST: 98 %
STRESS PERCENT HR: 114 %
STRESS POST ESTIMATED WORKLOAD: 6.3 METS
STRESS POST EXERCISE DUR MIN: 5 MIN
STRESS POST EXERCISE DUR SEC: 29 SEC
STRESS POST O2 SAT PEAK: 97 %
STRESS POST PEAK BP: NORMAL MMHG
STRESS POST PEAK HR: 150 BPM
STRESS TARGET HR: 132 BPM
STRESS TARGET HR: 132 BPM

## 2020-11-16 ENCOUNTER — OFFICE VISIT (OUTPATIENT)
Dept: FAMILY MEDICINE CLINIC | Facility: CLINIC | Age: 65
End: 2020-11-16

## 2020-11-16 VITALS
OXYGEN SATURATION: 97 % | BODY MASS INDEX: 27.11 KG/M2 | DIASTOLIC BLOOD PRESSURE: 70 MMHG | HEIGHT: 70 IN | TEMPERATURE: 97.8 F | HEART RATE: 97 BPM | WEIGHT: 189.4 LBS | SYSTOLIC BLOOD PRESSURE: 104 MMHG

## 2020-11-16 DIAGNOSIS — R55 SYNCOPE, UNSPECIFIED SYNCOPE TYPE: ICD-10-CM

## 2020-11-16 DIAGNOSIS — N41.0 ACUTE PROSTATITIS: Primary | ICD-10-CM

## 2020-11-16 DIAGNOSIS — E11.65 TYPE 2 DIABETES MELLITUS WITH HYPERGLYCEMIA, WITHOUT LONG-TERM CURRENT USE OF INSULIN (HCC): ICD-10-CM

## 2020-11-16 DIAGNOSIS — N28.9 RENAL INSUFFICIENCY: ICD-10-CM

## 2020-11-16 LAB
EXPIRATION DATE: NORMAL
GLUCOSE BLDC GLUCOMTR-MCNC: 86 MG/DL (ref 70–130)
HBA1C MFR BLD: 5.6 %
Lab: NORMAL

## 2020-11-16 PROCEDURE — 83036 HEMOGLOBIN GLYCOSYLATED A1C: CPT | Performed by: FAMILY MEDICINE

## 2020-11-16 PROCEDURE — 99214 OFFICE O/P EST MOD 30 MIN: CPT | Performed by: FAMILY MEDICINE

## 2020-11-16 PROCEDURE — 82962 GLUCOSE BLOOD TEST: CPT | Performed by: FAMILY MEDICINE

## 2020-11-16 RX ORDER — TAMSULOSIN HYDROCHLORIDE 0.4 MG/1
1 CAPSULE ORAL NIGHTLY
Qty: 14 CAPSULE | Refills: 0 | Status: SHIPPED | OUTPATIENT
Start: 2020-11-16 | End: 2021-05-24

## 2020-11-16 RX ORDER — SULFAMETHOXAZOLE AND TRIMETHOPRIM 800; 160 MG/1; MG/1
1 TABLET ORAL 2 TIMES DAILY
Qty: 14 TABLET | Refills: 0 | Status: SHIPPED | OUTPATIENT
Start: 2020-11-16 | End: 2020-12-11

## 2020-11-16 NOTE — PROGRESS NOTES
"Subjective   Mp Wen is a 65 y.o. male seen today for Diabetes.     Diabetes  He presents for his follow-up diabetic visit. He has type 2 diabetes mellitus. There are no hypoglycemic associated symptoms. (Glucose today is 86.) Associated symptoms include polyuria. Pertinent negatives for diabetes include no chest pain. (Urinating about once an hour for about 2 weeks  ) There are no hypoglycemic complications. There are no diabetic complications. Risk factors for coronary artery disease include diabetes mellitus, male sex and hypertension. His weight is stable. He is following a generally healthy diet. He participates in exercise intermittently. His home blood glucose trend is decreasing steadily (A1C today is 5.6% this is down from June at 6.2%.). An ACE inhibitor/angiotensin II receptor blocker is being taken.        The following portions of the patient's history were reviewed and updated as appropriate: allergies, current medications, past social history and problem list.    Review of Systems   Constitutional: Negative for chills and fever.   Respiratory: Negative for shortness of breath.    Cardiovascular: Negative for chest pain.   Endocrine: Positive for polyuria.   Genitourinary: Positive for frequency.       Objective   /70 (BP Location: Left arm)   Pulse 97   Temp 97.8 °F (36.6 °C)   Ht 177 cm (69.69\")   Wt 85.9 kg (189 lb 6.4 oz)   SpO2 97%   BMI 27.42 kg/m²   Physical Exam  Vitals signs and nursing note reviewed.   Constitutional:       Appearance: Normal appearance.   Neurological:      Mental Status: He is alert.         Assessment/Plan   Problems Addressed this Visit     None      Visit Diagnoses     Acute prostatitis    -  Primary    Relevant Medications    sulfamethoxazole-trimethoprim (BACTRIM DS,SEPTRA DS) 800-160 MG per tablet    tamsulosin (FLOMAX) 0.4 MG capsule 24 hr capsule    Type 2 diabetes mellitus with hyperglycemia, without long-term current use of insulin (CMS/[facility])     "    Relevant Orders    POC Glycosylated Hemoglobin (Hb A1C) (Completed)    POCT Glucose (Completed)    Syncope, unspecified syncope type        Renal insufficiency          Diagnoses       Codes Comments    Acute prostatitis    -  Primary ICD-10-CM: N41.0  ICD-9-CM: 601.0     Type 2 diabetes mellitus with hyperglycemia, without long-term current use of insulin (CMS/[facility])     ICD-10-CM: E11.65  ICD-9-CM: 250.00, 790.29     Syncope, unspecified syncope type     ICD-10-CM: R55  ICD-9-CM: 780.2     Renal insufficiency     ICD-10-CM: N28.9  ICD-9-CM: 593.9               Drink plenty fluids.    Discontinue the Lisinopril.    Rx for Tamsulosin 0.4 mg nightly #14+0.    Rx for Bactrim DS twice a day #28+0.    Continue medications as doing.    Follow up in 2 weeks.          Scribed for Dr Denny Howard by Edith Javed CMA.          I, Denny Howard MD, personally performed the services described in this documentation, as scribed by Edith Javed in my presence, and is both accurate and complete.        (Please note that portions of this note were completed with a voice recognition program. Efforts were made to edit the dictations,but occasionally words are mis transcribed.)

## 2020-11-17 ENCOUNTER — TELEPHONE (OUTPATIENT)
Dept: FAMILY MEDICINE CLINIC | Facility: CLINIC | Age: 65
End: 2020-11-17

## 2020-11-17 NOTE — TELEPHONE ENCOUNTER
PATIENT CALLED WITH QUESTIONS CONCERNING MEDICATION THAT WAS PRESCRIBED TO HIM:     [id number] - 800 - 160 TAB AMN QUANTITY 14      PATIENT UNDERSTOOD DURING OFFICE VISIT THAT HE WAS TO TAKE MEDICATION FOR A LONGER AMOUNT OF TIME BUT THE DIRECTIONS ON BOTTLE SAYS 1 TAB TWICE A DAY, WHICH MEANS HE WILL BE OUT IN ONE WEEK AND WOULD LIKE CLARIFICATION ON THAT.     PLEASE CALL AND ADVISE: [phone]

## 2020-11-23 ENCOUNTER — TELEPHONE (OUTPATIENT)
Dept: FAMILY MEDICINE CLINIC | Facility: CLINIC | Age: 65
End: 2020-11-23

## 2020-11-23 NOTE — TELEPHONE ENCOUNTER
PATIENT CALLED AND REQUESTING ALL MEDICATION THAT IS UP FOR RENEWAL THAT WAS PREVIOUSLY SENT TO [facility] SHOULD ALL GO TO     [address] [place] [address] [phone]

## 2020-11-23 NOTE — TELEPHONE ENCOUNTER
Called pt and notified him that he could request his medicines be transferred to [facility].

## 2020-12-11 ENCOUNTER — OFFICE VISIT (OUTPATIENT)
Dept: FAMILY MEDICINE CLINIC | Facility: CLINIC | Age: 65
End: 2020-12-11

## 2020-12-11 VITALS
RESPIRATION RATE: 16 BRPM | DIASTOLIC BLOOD PRESSURE: 86 MMHG | HEIGHT: 70 IN | OXYGEN SATURATION: 95 % | BODY MASS INDEX: 26.48 KG/M2 | HEART RATE: 75 BPM | TEMPERATURE: 98.4 F | WEIGHT: 185 LBS | SYSTOLIC BLOOD PRESSURE: 132 MMHG

## 2020-12-11 DIAGNOSIS — N41.0 ACUTE PROSTATITIS: Primary | ICD-10-CM

## 2020-12-11 DIAGNOSIS — I10 ESSENTIAL HYPERTENSION: ICD-10-CM

## 2020-12-11 PROCEDURE — 99213 OFFICE O/P EST LOW 20 MIN: CPT | Performed by: FAMILY MEDICINE

## 2020-12-11 NOTE — PROGRESS NOTES
"Subjective   Mp Wen is a 65 y.o. male seen today for Prostatitis.     History of Present Illness   The patient is here today for a follow up on Prostatitis.    States he is doing better.  Took two weeks of Bactrim and that did help.  He is having less frequency at night with the Tamsulosin.  Denies any fever or chills, Denies any chest pain or shortness of breath.    BP today is 132/86.   He is off the Lisinopril right now.        The following portions of the patient's history were reviewed and updated as appropriate: allergies, current medications, past social history and problem list.    Review of Systems   Constitutional: Negative for activity change, appetite change, chills and fever.   Respiratory: Negative for cough and shortness of breath.    Cardiovascular: Negative for chest pain.   Genitourinary: Negative for decreased urine volume, difficulty urinating, discharge, dysuria, enuresis, flank pain, frequency, genital sores, hematuria, penile pain, penile swelling, scrotal swelling, testicular pain and urgency.       Objective   /86   Pulse 75   Temp 98.4 °F (36.9 °C)   Resp 16   Ht 177.8 cm (70\")   Wt 83.9 kg (185 lb)   SpO2 95%   BMI 26.54 kg/m²   Physical Exam    Assessment/Plan   Problems Addressed this Visit        Cardiovascular and Mediastinum    Essential hypertension      Other Visit Diagnoses     Acute prostatitis    -  Primary      Diagnoses       Codes Comments    Acute prostatitis    -  Primary ICD-10-CM: N41.0  ICD-9-CM: 601.0     Essential hypertension     ICD-10-CM: I10  ICD-9-CM: 401.9               Drink plenty fluids.    Try holding the Tamsulosin. If the Frequency in urination returns, then restart it.    Continue other medications as doing.    Restart  Lisinopril at 10 mg daily. Take 1/2 tablet of the 20 mg tablets he has. Call when he needs a refill.    Follow up 2 months for a welcome to Medicare exam.        Scribed for Dr Denny Howard by Edith Javed " CMA.          I, Dneny Howard MD, personally performed the services described in this documentation, as scribed by Edith Javed in my presence, and is both accurate and complete.        (Please note that portions of this note were completed with a voice recognition program. Efforts were made to edit the dictations,but occasionally words are mis transcribed.)

## 2021-02-23 DIAGNOSIS — E78.2 MIXED HYPERLIPIDEMIA: ICD-10-CM

## 2021-02-23 RX ORDER — ATORVASTATIN CALCIUM 20 MG/1
20 TABLET, FILM COATED ORAL DAILY
Qty: 90 TABLET | Refills: 3 | Status: SHIPPED | OUTPATIENT
Start: 2021-02-23 | End: 2021-02-26

## 2021-02-23 NOTE — TELEPHONE ENCOUNTER
PT CALLED REQUESTING A REFILL FOR:  atorvastatin (LIPITOR) 20 MG tablet    [facility]/pharmacy #[id number] - [place], [place] - [address] [phone] [facility] [phone] FX

## 2021-02-26 DIAGNOSIS — E78.2 MIXED HYPERLIPIDEMIA: ICD-10-CM

## 2021-02-26 RX ORDER — ATORVASTATIN CALCIUM 20 MG/1
TABLET, FILM COATED ORAL
Qty: 90 TABLET | Refills: 0 | Status: SHIPPED | OUTPATIENT
Start: 2021-02-26 | End: 2021-05-03 | Stop reason: SDUPTHER

## 2021-05-03 ENCOUNTER — TELEPHONE (OUTPATIENT)
Dept: FAMILY MEDICINE CLINIC | Facility: CLINIC | Age: 66
End: 2021-05-03

## 2021-05-03 DIAGNOSIS — E78.2 MIXED HYPERLIPIDEMIA: ICD-10-CM

## 2021-05-03 NOTE — TELEPHONE ENCOUNTER
Caller: Mp Wen    Relationship: Self    Best call back number: [phone]     Medication needed:   Requested Prescriptions     Pending Prescriptions Disp Refills   • atorvastatin (LIPITOR) 20 MG tablet 90 tablet 0     Sig: Take 1 tablet by mouth Daily.       When do you need the refill by: TODAY     What additional details did the patient provide when requesting the medication: TOOK LAST TABLET THIS MORNING -   REQUESTING A 90 DAY SUPPLY     Does the patient have less than a 3 day supply:  [x] Yes  [] No    What is the patient's preferred pharmacy:  [facility]/pharmacy #[id number] [place], [place] - [address] [phone]

## 2021-05-03 NOTE — TELEPHONE ENCOUNTER
Caller: Mp Wen    Relationship: Self    Best call back number:[phone]   Who is your current provider: DR. IZQUIERDO     Who would you like your new provider to be: PREFERS A MALE WITH MOST EXPERIENCE     What are your reasons for transferring care: DR. IZQUIERDO RETIRED     Additional notes: PATIENT WAS UNABLE TO MAKE IT TO LAST APPOINTMENT WITH DR. IZQUIERDO BUT WAS TOLD PREVIOUSLY HE WOULD HAND PICK A NEW PCP FOR HIM.   PATIENT WANTS APPOINTMENT FOR BOTH HE AND HIS WIFE ON THE SAME DAY EITHER ON MAY 24 OR 25TH.    HUB WAS UNABLE TO FIND TWO NEW PATIENT APPOINTMENTS ON THOSE DAYS

## 2021-05-04 DIAGNOSIS — E78.2 MIXED HYPERLIPIDEMIA: ICD-10-CM

## 2021-05-04 RX ORDER — ATORVASTATIN CALCIUM 20 MG/1
20 TABLET, FILM COATED ORAL DAILY
Qty: 90 TABLET | Refills: 0 | Status: CANCELLED | OUTPATIENT
Start: 2021-05-04

## 2021-05-04 RX ORDER — ATORVASTATIN CALCIUM 20 MG/1
20 TABLET, FILM COATED ORAL DAILY
Qty: 90 TABLET | Refills: 0 | Status: SHIPPED | OUTPATIENT
Start: 2021-05-04 | End: 2021-08-28

## 2021-05-24 ENCOUNTER — OFFICE VISIT (OUTPATIENT)
Dept: FAMILY MEDICINE CLINIC | Facility: CLINIC | Age: 66
End: 2021-05-24

## 2021-05-24 VITALS
BODY MASS INDEX: 26.6 KG/M2 | RESPIRATION RATE: 18 BRPM | HEART RATE: 85 BPM | HEIGHT: 70 IN | WEIGHT: 185.8 LBS | TEMPERATURE: 98.4 F | DIASTOLIC BLOOD PRESSURE: 60 MMHG | SYSTOLIC BLOOD PRESSURE: 90 MMHG | OXYGEN SATURATION: 97 %

## 2021-05-24 DIAGNOSIS — Z00.00 MEDICARE ANNUAL WELLNESS VISIT, SUBSEQUENT: ICD-10-CM

## 2021-05-24 DIAGNOSIS — E78.2 MIXED HYPERLIPIDEMIA: ICD-10-CM

## 2021-05-24 DIAGNOSIS — Z12.5 PROSTATE CANCER SCREENING: Primary | ICD-10-CM

## 2021-05-24 DIAGNOSIS — L98.9 SKIN LESION: ICD-10-CM

## 2021-05-24 PROCEDURE — 80061 LIPID PANEL: CPT | Performed by: FAMILY MEDICINE

## 2021-05-24 PROCEDURE — 82043 UR ALBUMIN QUANTITATIVE: CPT | Performed by: FAMILY MEDICINE

## 2021-05-24 PROCEDURE — 80053 COMPREHEN METABOLIC PANEL: CPT | Performed by: FAMILY MEDICINE

## 2021-05-24 PROCEDURE — 93000 ELECTROCARDIOGRAM COMPLETE: CPT | Performed by: FAMILY MEDICINE

## 2021-05-24 PROCEDURE — 83036 HEMOGLOBIN GLYCOSYLATED A1C: CPT | Performed by: FAMILY MEDICINE

## 2021-05-24 PROCEDURE — G0103 PSA SCREENING: HCPCS | Performed by: FAMILY MEDICINE

## 2021-05-24 PROCEDURE — G0439 PPPS, SUBSEQ VISIT: HCPCS | Performed by: FAMILY MEDICINE

## 2021-05-24 NOTE — PROGRESS NOTES
The ABCs of the Annual Wellness Visit  Subsequent Medicare Wellness Visit    Chief Complaint   Patient presents with   • Establish Care       Subjective   History of Present Illness:  Mp Wen is a 66 y.o. male who presents for a Subsequent Medicare Wellness Visit.    Skin lesion on left temple - has grown over the last year. He report sit used to be sub centimeter in size. He reports he has noticed it grow but otherwise no changes. He doesn't have hx of skin cancer.     Abnormal ekg - previous ekg changes noted and worked up. Stress test and echo were reviewed and essentially unremarkable beside some regurg noted. Patient without chest pain or shortness of breath.         HEALTH RISK ASSESSMENT    Recent Hospitalizations:  No hospitalization(s) within the last year.    Current Medical Providers:  Patient Care Team:  Howie Raymundo DO as PCP - General (Family Medicine)    Smoking Status:  Social History     Tobacco Use   Smoking Status Former Smoker   • Packs/day: 0.50   • Years: 28.00   • Pack years: 14.00   • Types: Cigarettes   • Quit date: 1996   • Years since quittin.0   Smokeless Tobacco Never Used       Alcohol Consumption:  Social History     Substance and Sexual Activity   Alcohol Use No       Depression Screen:   PHQ-2/PHQ-9 Depression Screening 2020   Little interest or pleasure in doing things 0   Feeling down, depressed, or hopeless 0   Total Score 0       Fall Risk Screen:  STEADI Fall Risk Assessment has not been completed.    Health Habits and Functional and Cognitive Screening:  No flowsheet data found.      Does the patient have evidence of cognitive impairment? No    Asprin use counseling:Taking ASA appropriately as indicated    Age-appropriate Screening Schedule:  Refer to the list below for future screening recommendations based on patient's age, sex and/or medical conditions. Orders for these recommended tests are listed in the plan section. The patient has been provided with  a written plan.    Health Maintenance   Topic Date Due   • URINE MICROALBUMIN  Never done   • ZOSTER VACCINE (2 of 3) 04/24/2015   • DIABETIC FOOT EXAM  Never done   • DIABETIC EYE EXAM  Never done   • LIPID PANEL  02/19/2021   • HEMOGLOBIN A1C  05/16/2021   • INFLUENZA VACCINE  08/01/2021   • TDAP/TD VACCINES (2 - Td or Tdap) 02/21/2028          The following portions of the patient's history were reviewed and updated as appropriate: allergies, current medications, past family history, past medical history, past social history, past surgical history and problem list.    Outpatient Medications Prior to Visit   Medication Sig Dispense Refill   • aspirin (ASPIRIN LOW DOSE) 81 MG tablet Take 81 mg by mouth Daily.     • atorvastatin (LIPITOR) 20 MG tablet Take 1 tablet by mouth Daily. 90 tablet 0   • cholecalciferol (VITAMIN D3) 25 MCG (1000 UT) tablet Take 1,000 Units by mouth Daily.     • meclizine (ANTIVERT) 25 MG tablet Take 1 tablet by mouth 3 (three) times a day as needed.     • sildenafil (REVATIO) 20 MG tablet Take 20 mg by mouth Daily As Needed.     • fluticasone (FLONASE) 50 MCG/ACT nasal spray 2 sprays into the nostril(s) as directed by provider Daily. Administer 2 sprays in each nostril for each dose. 1 bottle 2   • tamsulosin (FLOMAX) 0.4 MG capsule 24 hr capsule Take 1 capsule by mouth Every Night. 14 capsule 0     No facility-administered medications prior to visit.       Patient Active Problem List   Diagnosis   • Abnormal electrocardiogram   • Benign prostatic hypertrophy without urinary obstruction   • Dysphagia   • Erectile dysfunction of nonorganic origin   • Gastroesophageal reflux disease   • Hepatitis C virus infection   • Essential hypertension   • Low back pain   • Retinal detachment   • Vertigo   • Labyrinthitis   • Hepatitis C   • ED (erectile dysfunction) of non-organic origin   • Syncope and collapse   • Palpitations   • Mixed hyperlipidemia   • Skin lesion   • Prostate cancer screening  "      Advanced Care Planning:  ACP discussion was held with the patient during this visit. Patient has an advance directive (not in EMR), copy requested.    Review of Systems   Respiratory: Positive for shortness of breath.    Cardiovascular: Negative for chest pain and leg swelling.   Skin: Positive for rash.       Compared to one year ago, the patient feels his physical health is better.  Compared to one year ago, the patient feels his mental health is better.    Reviewed chart for potential of high risk medication in the elderly: yes  Reviewed chart for potential of harmful drug interactions in the elderly:yes    Objective         Vitals:    05/24/21 1417   BP: 90/60   Pulse: 85   Resp: 18   Temp: 98.4 °F (36.9 °C)   TempSrc: Temporal   SpO2: 97%   Weight: 84.3 kg (185 lb 12.8 oz)   Height: 177.8 cm (70\")   PainSc: 0-No pain       Body mass index is 26.66 kg/m².  Discussed the patient's BMI with him. The BMI is in the acceptable range.    Physical Exam  Eyes:      Pupils: Pupils are equal, round, and reactive to light.   Cardiovascular:      Rate and Rhythm: Normal rate and regular rhythm.      Heart sounds: No murmur heard.     Pulmonary:      Effort: Pulmonary effort is normal. No respiratory distress.      Breath sounds: No wheezing.   Abdominal:      General: There is no distension.   Skin:     General: Skin is warm.      Comments: Papular lesion on right forehead and lesion is normopigmented. Lesion is raised and has globular texture. Lesion is 1.7cm x 1.2cm    Neurological:      Mental Status: He is alert.   Psychiatric:         Mood and Affect: Mood normal.               Assessment/Plan   Medicare Risks and Personalized Health Plan  CMS Preventative Services Quick Reference  Advance Directive Discussion  Colon Cancer Screening  Depression/Dysphoria  Immunizations Discussed/Encouraged (specific immunizations; Shingrix )  Inadequate Social Support, Isolation, Loneliness, Lack of Transportation, Financial " Difficulties, or Caregiver Stress   Obesity/Overweight   Prostate Cancer Screening     The above risks/problems have been discussed with the patient.  Pertinent information has been shared with the patient in the After Visit Summary.  Follow up plans and orders are seen below in the Assessment/Plan Section.    Diagnoses and all orders for this visit:    1. Prostate cancer screening (Primary)  -     PSA SCREENING    2. Mixed hyperlipidemia    3. Medicare annual wellness visit, subsequent  -     MicroAlbumin, Urine, Random - Urine, Clean Catch  -     Lipid Panel  -     Hemoglobin A1c  -     Comprehensive Metabolic Panel  -     ECG 12 Lead    4. Skin lesion        ECG 12 Lead    Date/Time: 5/24/2021 8:12 PM  Performed by: Howie Raymundo DO  Authorized by: Howie Raymundo DO   Comparison: compared with previous ECG from 6/22/2020  Comparison to previous ECG: No changes since last ekg or echo  Rhythm: sinus rhythm  Rate: normal  Conduction: 1st degree AV block  Q waves: V1 and V2    ST Segments: ST segments normal  T Waves: T waves normal  QRS axis: left  Other: no other findings    Clinical impression: abnormal EKG          rtc prn for skin biopsy    Follow Up:  Return in about 1 year (around 5/24/2022), or if symptoms worsen or fail to improve.     An After Visit Summary and PPPS were given to the patient.

## 2021-05-24 NOTE — PATIENT INSTRUCTIONS
MyPlate from USDA    MyPlate is an outline of a general healthy diet based on the 2010 Dietary Guidelines for Americans, from the U.S. Department of Agriculture (USDA). It sets guidelines for how much food you should eat from each food group based on your age, sex, and level of physical activity.  What are tips for following MyPlate?  To follow MyPlate recommendations:  · Eat a wide variety of fruits and vegetables, grains, and protein foods.  · Serve smaller portions and eat less food throughout the day.  · Limit portion sizes to avoid overeating.  · Enjoy your food.  · Get at least 150 minutes of exercise every week. This is about 30 minutes each day, 5 or more days per week.  It can be difficult to have every meal look like MyPlate. Think about MyPlate as eating guidelines for an entire day, rather than each individual meal.  Fruits and vegetables  · Make half of your plate fruits and vegetables.  · Eat many different colors of fruits and vegetables each day.  · For a 2,000 calorie daily food plan, eat:  ? 2½ cups of vegetables every day.  ? 2 cups of fruit every day.  · 1 cup is equal to:  ? 1 cup raw or cooked vegetables.  ? 1 cup raw fruit.  ? 1 medium-sized orange, apple, or banana.  ? 1 cup 100% fruit or vegetable juice.  ? 2 cups raw leafy greens, such as lettuce, spinach, or kale.  ? ½ cup dried fruit.  Grains  · One fourth of your plate should be grains.  · Make at least half of the grains you eat each day whole grains.  · For a 2,000 calorie daily food plan, eat 6 oz of grains every day.  · 1 oz is equal to:  ? 1 slice bread.  ? 1 cup cereal.  ? ½ cup cooked rice, cereal, or pasta.  Protein  · One fourth of your plate should be protein.  · Eat a wide variety of protein foods, including meat, poultry, fish, eggs, beans, nuts, and tofu.  · For a 2,000 calorie daily food plan, eat 5½ oz of protein every day.  · 1 oz is equal to:  ? 1 oz meat, poultry, or fish.  ? ¼ cup cooked beans.  ? 1 egg.  ? ½ oz nuts  or seeds.  ? 1 Tbsp peanut butter.  Dairy  · Drink fat-free or low-fat (1%) milk.  · Eat or drink dairy as a side to meals.  · For a 2,000 calorie daily food plan, eat or drink 3 cups of dairy every day.  · 1 cup is equal to:  ? 1 cup milk, yogurt, cottage cheese, or soy milk (soy beverage).  ? 2 oz processed cheese.  ? 1½ oz natural cheese.  Fats, oils, salt, and sugars  · Only small amounts of oils are recommended.  · Avoid foods that are high in calories and low in nutritional value (empty calories), like foods high in fat or added sugars.  · Choose foods that are low in salt (sodium). Choose foods that have less than 140 milligrams (mg) of sodium per serving.  · Drink water instead of sugary drinks. Drink enough water each day to keep your urine pale yellow.  Where to find support  · Work with your health care provider or a nutrition specialist (dietitian) to develop a customized eating plan that is right for you.  · Download an juan (mobile application) to help you track your daily food intake.  Where to find more information  · Go to ChooseMyPlate.gov for more information.  Summary  · MyPlate is a general guideline for healthy eating from the USDA. It is based on the 2010 Dietary Guidelines for Americans.  · In general, fruits and vegetables should take up ½ of your plate, grains should take up ¼ of your plate, and protein should take up ¼ of your plate.  This information is not intended to replace advice given to you by your health care provider. Make sure you discuss any questions you have with your health care provider.  Document Revised: 05/21/2020 Document Reviewed: 03/19/2018  Elsevier Patient Education © 2021 Elsevier Inc.

## 2021-05-25 LAB
ALBUMIN SERPL-MCNC: 4.6 G/DL (ref 3.5–5.2)
ALBUMIN UR-MCNC: <1.2 MG/DL
ALBUMIN/GLOB SERPL: 1.4 G/DL
ALP SERPL-CCNC: 68 U/L (ref 39–117)
ALT SERPL W P-5'-P-CCNC: 22 U/L (ref 1–41)
ANION GAP SERPL CALCULATED.3IONS-SCNC: 8.3 MMOL/L (ref 5–15)
AST SERPL-CCNC: 24 U/L (ref 1–40)
BILIRUB SERPL-MCNC: 0.9 MG/DL (ref 0–1.2)
BUN SERPL-MCNC: 17 MG/DL (ref 8–23)
BUN/CREAT SERPL: 14.2 (ref 7–25)
CALCIUM SPEC-SCNC: 9.8 MG/DL (ref 8.6–10.5)
CHLORIDE SERPL-SCNC: 97 MMOL/L (ref 98–107)
CHOLEST SERPL-MCNC: 169 MG/DL (ref 0–200)
CO2 SERPL-SCNC: 27.7 MMOL/L (ref 22–29)
CREAT SERPL-MCNC: 1.2 MG/DL (ref 0.76–1.27)
GFR SERPL CREATININE-BSD FRML MDRD: 73 ML/MIN/1.73
GLOBULIN UR ELPH-MCNC: 3.3 GM/DL
GLUCOSE SERPL-MCNC: 95 MG/DL (ref 65–99)
HBA1C MFR BLD: 5.96 % (ref 4.8–5.6)
HDLC SERPL-MCNC: 36 MG/DL (ref 40–60)
LDLC SERPL CALC-MCNC: 103 MG/DL (ref 0–100)
LDLC/HDLC SERPL: 2.76 {RATIO}
POTASSIUM SERPL-SCNC: 5.2 MMOL/L (ref 3.5–5.2)
PROT SERPL-MCNC: 7.9 G/DL (ref 6–8.5)
PSA SERPL-MCNC: 0.48 NG/ML (ref 0–4)
SODIUM SERPL-SCNC: 133 MMOL/L (ref 136–145)
TRIGL SERPL-MCNC: 169 MG/DL (ref 0–150)
VLDLC SERPL-MCNC: 30 MG/DL (ref 5–40)

## 2021-07-12 ENCOUNTER — TELEPHONE (OUTPATIENT)
Dept: FAMILY MEDICINE CLINIC | Facility: CLINIC | Age: 66
End: 2021-07-12

## 2021-07-12 DIAGNOSIS — J40 BRONCHITIS: ICD-10-CM

## 2021-07-12 RX ORDER — LISINOPRIL 10 MG/1
10 TABLET ORAL DAILY
Qty: 90 TABLET | Refills: 1 | Status: SHIPPED | OUTPATIENT
Start: 2021-07-12 | End: 2021-12-06 | Stop reason: SDUPTHER

## 2021-07-12 NOTE — TELEPHONE ENCOUNTER
Caller: Mp Wen    Relationship: Self    Best call back number: [phone]    What medication are you requesting: LISINOPRIL 10 MG TABLETS    If a prescription is needed, what is your preferred pharmacy and phone number: [facility]/PHARMACY #[id number] - [place], [place] - [address] [phone] [facility] [phone]      Additional notes:  PATIENT STATED HE HAS BEEN TAKING THIS FOR YEARS AND IS OUT OF MEDICATION

## 2021-07-12 NOTE — TELEPHONE ENCOUNTER
PATIENT CALLED BACK TO REQUEST REFILL ON MEDICAITION   LISINOPRIL 10MG.     STATED THAT DR. IZQUIERDO HAD PREVIOUSLY PUT ON 20MG BUT THAT WAS TO MUCH AND HAD TOLD HIM TO CUT IN HALF SO WAS ONLY TAKING 10MG.     I DID NOT SEE THIS MEDICATION ON MED LIST.  PATIENT TOOK LAST DOSE YESTERDAY       [facility]/pharmacy #[id number] - [place], [place] - [address] [phone]

## 2021-08-23 ENCOUNTER — TELEPHONE (OUTPATIENT)
Dept: FAMILY MEDICINE CLINIC | Facility: CLINIC | Age: 66
End: 2021-08-23

## 2021-08-23 NOTE — TELEPHONE ENCOUNTER
Caller: Mp Wen    Relationship: Self    Best call back number: [phone]  [phone]    What is the best time to reach you: ANYTIME     Who are you requesting to speak with (clinical staff, provider,  specific staff member): CLINICAL STAFF        What was the call regarding: PATIENT WOULD LIKE TO KNOW IF HE IS DUE FOR A SHINGLES SHOT     Do you require a callback: YES

## 2021-08-28 DIAGNOSIS — E78.2 MIXED HYPERLIPIDEMIA: ICD-10-CM

## 2021-08-28 RX ORDER — ATORVASTATIN CALCIUM 20 MG/1
TABLET, FILM COATED ORAL
Qty: 90 TABLET | Refills: 0 | Status: SHIPPED | OUTPATIENT
Start: 2021-08-28 | End: 2021-11-27

## 2021-11-26 DIAGNOSIS — E78.2 MIXED HYPERLIPIDEMIA: ICD-10-CM

## 2021-11-27 RX ORDER — ATORVASTATIN CALCIUM 20 MG/1
TABLET, FILM COATED ORAL
Qty: 90 TABLET | Refills: 0 | Status: SHIPPED | OUTPATIENT
Start: 2021-11-27 | End: 2021-12-06 | Stop reason: SDUPTHER

## 2021-12-06 DIAGNOSIS — E78.2 MIXED HYPERLIPIDEMIA: ICD-10-CM

## 2021-12-06 RX ORDER — ATORVASTATIN CALCIUM 20 MG/1
20 TABLET, FILM COATED ORAL DAILY
Qty: 90 TABLET | Refills: 0 | Status: SHIPPED | OUTPATIENT
Start: 2021-12-06 | End: 2022-02-14 | Stop reason: SDUPTHER

## 2021-12-06 RX ORDER — LISINOPRIL 10 MG/1
10 TABLET ORAL DAILY
Qty: 90 TABLET | Refills: 1 | Status: SHIPPED | OUTPATIENT
Start: 2021-12-06 | End: 2021-12-23

## 2021-12-06 NOTE — TELEPHONE ENCOUNTER
Caller: [facility]        Best call back number: [phone]  Requested Prescriptions:   Requested Prescriptions     Pending Prescriptions Disp Refills   • atorvastatin (LIPITOR) 20 MG tablet 90 tablet 0     Sig: Take 1 tablet by mouth Daily.   • lisinopril (PRINIVIL,ZESTRIL) 10 MG tablet 90 tablet 1     Sig: Take 1 tablet by mouth Daily.        Pharmacy where request should be sent:      [facility] - [address] - [phone] [facility] [phone]         Additional details provided by patient:    Does the patient have less than a 3 day supply:  [] Yes  [] No    Dana Lynch Rep   12/06/21 13:37 EST

## 2021-12-23 ENCOUNTER — OFFICE VISIT (OUTPATIENT)
Dept: FAMILY MEDICINE CLINIC | Facility: CLINIC | Age: 66
End: 2021-12-23

## 2021-12-23 VITALS
OXYGEN SATURATION: 97 % | BODY MASS INDEX: 27.4 KG/M2 | HEART RATE: 84 BPM | DIASTOLIC BLOOD PRESSURE: 72 MMHG | TEMPERATURE: 98.7 F | RESPIRATION RATE: 10 BRPM | HEIGHT: 69 IN | SYSTOLIC BLOOD PRESSURE: 110 MMHG | WEIGHT: 185 LBS

## 2021-12-23 DIAGNOSIS — L98.9 SKIN LESION: ICD-10-CM

## 2021-12-23 DIAGNOSIS — R73.03 PREDIABETES: Primary | ICD-10-CM

## 2021-12-23 DIAGNOSIS — I10 ESSENTIAL HYPERTENSION: ICD-10-CM

## 2021-12-23 PROCEDURE — 99214 OFFICE O/P EST MOD 30 MIN: CPT | Performed by: FAMILY MEDICINE

## 2021-12-23 RX ORDER — LISINOPRIL 5 MG/1
5 TABLET ORAL DAILY
Qty: 90 TABLET | Refills: 3 | Status: SHIPPED | OUTPATIENT
Start: 2021-12-23 | End: 2022-03-07 | Stop reason: SDUPTHER

## 2021-12-23 NOTE — PROGRESS NOTES
Follow Up Office Visit      Patient Name: Mp Wen  : 1955   MRN: [id number]     Chief Complaint:    Chief Complaint   Patient presents with   • f/u on knot on head     wants removed        History of Present Illness: Mp Wen is a 66 y.o. male who is here today to follow up with hypertension well controlled.  Actually patient was having side effects from having his hypertension to well controlled.  He has decreased the dose of 5 mg and would like to change the prescription to that effect.    Patient has a skin lesion on his left scalp.  Patient would like this removed and he has several other lesions that he would like to have checked out.  Due to the extensiveness of lesions I referred patient to dermatology    Patient has prediabetes well controlled on diet.  Today we need to reevaluate his prediabetes with an A1c      Review of systems was positive for skin lesion    Physical exam: Patient had a skin lesion on his left temporal region that was papular and about 2.2 cm in diameter.  It was slightly raised and rough.  Under traumatic scopic examination had a globular type of appearance.      Subjective        I have reviewed and the following portions of the patient's history were updated as appropriate: past family history, past medical history, past social history, past surgical history and problem list.    Medications:     Current Outpatient Medications:   •  aspirin (ASPIRIN LOW DOSE) 81 MG tablet, Take 81 mg by mouth Daily., Disp: , Rfl:   •  atorvastatin (LIPITOR) 20 MG tablet, Take 1 tablet by mouth Daily., Disp: 90 tablet, Rfl: 0  •  cholecalciferol (VITAMIN D3) 25 MCG (1000 UT) tablet, Take 1,000 Units by mouth Daily., Disp: , Rfl:   •  meclizine (ANTIVERT) 25 MG tablet, Take 1 tablet by mouth 3 (three) times a day as needed., Disp: , Rfl:   •  sildenafil (REVATIO) 20 MG tablet, Take 20 mg by mouth Daily As Needed., Disp: , Rfl:   •  lisinopril (PRINIVIL,ZESTRIL) 5 MG tablet, Take 1  "tablet by mouth Daily., Disp: 90 tablet, Rfl: 3    Allergies:   No Known Allergies    Objective     Physical Exam: Please see above  Vital Signs:   Vitals:    12/23/21 1019   BP: 110/72   Pulse: 84   Resp: 10   Temp: 98.7 °F (37.1 °C)   SpO2: 97%   Weight: 83.9 kg (185 lb)   Height: 175.3 cm (69\")   PainSc: 0-No pain     Body mass index is 27.32 kg/m².          Assessment / Plan      Assessment/Plan:   Diagnoses and all orders for this visit:    1. Prediabetes (Primary)  -     Hemoglobin A1c    2. Skin lesion  -     Ambulatory Referral to Dermatology    3. Essential hypertension  -     lisinopril (PRINIVIL,ZESTRIL) 5 MG tablet; Take 1 tablet by mouth Daily.  Dispense: 90 tablet; Refill: 3    Agree with decreasing lisinopril to 5 mg.  We will refill his medication for the year.  Patient will return in 6 months for annual exam and repeat lab test    We will evaluate his prediabetes today.  His prediabetes has been well controlled on diet    Differential for patient skin lesion is seborrheic keratosis versus nevi.  Will likely need biopsy but I believe dermatology be better suited to evaluate patient's skin lesions    Follow Up:   Return in about 6 months (around 6/23/2022).    Howie Raymundo DO  [facility]   "

## 2021-12-24 LAB — HBA1C MFR BLD: 6.1 % (ref 4.8–5.6)

## 2022-01-04 ENCOUNTER — TELEPHONE (OUTPATIENT)
Dept: FAMILY MEDICINE CLINIC | Facility: CLINIC | Age: 67
End: 2022-01-04

## 2022-01-04 NOTE — TELEPHONE ENCOUNTER
Caller: Gayatri Wen    Relationship: Self    Best call back number: [phone]    Caller requesting test results: GAYATRI    What test was performed: BLOOD WORK, A1C    When was the test performed: 12/23/21    Where was the test performed: OFFICE

## 2022-02-14 DIAGNOSIS — E78.2 MIXED HYPERLIPIDEMIA: ICD-10-CM

## 2022-02-14 RX ORDER — ATORVASTATIN CALCIUM 20 MG/1
20 TABLET, FILM COATED ORAL DAILY
Qty: 90 TABLET | Refills: 0 | Status: SHIPPED | OUTPATIENT
Start: 2022-02-14 | End: 2022-02-16 | Stop reason: SDUPTHER

## 2022-02-15 DIAGNOSIS — E78.2 MIXED HYPERLIPIDEMIA: ICD-10-CM

## 2022-02-15 RX ORDER — ATORVASTATIN CALCIUM 20 MG/1
20 TABLET, FILM COATED ORAL DAILY
Qty: 90 TABLET | Refills: 0 | Status: CANCELLED | OUTPATIENT
Start: 2022-02-15

## 2022-02-16 ENCOUNTER — TELEPHONE (OUTPATIENT)
Dept: FAMILY MEDICINE CLINIC | Facility: CLINIC | Age: 67
End: 2022-02-16

## 2022-02-16 DIAGNOSIS — E78.2 MIXED HYPERLIPIDEMIA: ICD-10-CM

## 2022-02-16 RX ORDER — ATORVASTATIN CALCIUM 20 MG/1
20 TABLET, FILM COATED ORAL DAILY
Qty: 90 TABLET | Refills: 0 | Status: CANCELLED | OUTPATIENT
Start: 2022-02-16

## 2022-02-16 RX ORDER — ATORVASTATIN CALCIUM 20 MG/1
20 TABLET, FILM COATED ORAL DAILY
Qty: 90 TABLET | Refills: 0 | Status: SHIPPED | OUTPATIENT
Start: 2022-02-16 | End: 2022-03-07 | Stop reason: SDUPTHER

## 2022-02-16 NOTE — TELEPHONE ENCOUNTER
Caller: Mp Wen    Relationship: Self    Best call back number: [phone]    Requested Prescriptions:   Requested Prescriptions     Pending Prescriptions Disp Refills   • atorvastatin (LIPITOR) 20 MG tablet 90 tablet 0     Sig: Take 1 tablet by mouth Daily.        Pharmacy where request should be sent: [facility] - [facility] [address] - [phone]  - [phone]     Additional details provided by patient: PATIENT NEEDS THIS PRESCRIPTION FOR ATORVASTATIN, PREVIOUSLY SENT TO [facility], TO BE SENT INSTEAD TO [facility]. PLEASE LET PATIENT KNOW WHEN THIS IS COMPLETE, AS HE WAS TOLD IT MAY TAKE UP TO 10 DAYS TO GET THE MEDICINE DELIVERED ONCE [facility] HAS A PRESCRIPTION. PATIENT ALSO WANTED TO LET US KNOW HE DOESN'T NEED A REFILL OF LISINOPRIL YET AS HE HAS SEVERAL EXCESS DOSES AFTER DR MYERS LOWERED THE DOSE HE IS TAKING.    Does the patient have less than a 3 day supply:  [] Yes  [x] No    Dana Fuentes Rep   02/16/22 12:07 EST

## 2022-03-07 DIAGNOSIS — I10 ESSENTIAL HYPERTENSION: ICD-10-CM

## 2022-03-07 DIAGNOSIS — E78.2 MIXED HYPERLIPIDEMIA: ICD-10-CM

## 2022-03-07 RX ORDER — ATORVASTATIN CALCIUM 20 MG/1
20 TABLET, FILM COATED ORAL DAILY
Qty: 90 TABLET | Refills: 0 | Status: SHIPPED | OUTPATIENT
Start: 2022-03-07 | End: 2022-06-23 | Stop reason: SDUPTHER

## 2022-03-07 RX ORDER — LISINOPRIL 5 MG/1
5 TABLET ORAL DAILY
Qty: 90 TABLET | Refills: 3 | Status: SHIPPED | OUTPATIENT
Start: 2022-03-07 | End: 2022-06-23 | Stop reason: SDUPTHER

## 2022-03-07 NOTE — TELEPHONE ENCOUNTER
Caller: Mp Wen    Relationship: Self    Best call back number: [phone]    Requested Prescriptions:   Requested Prescriptions     Pending Prescriptions Disp Refills   • atorvastatin (LIPITOR) 20 MG tablet 90 tablet 0     Sig: Take 1 tablet by mouth Daily.   • lisinopril (PRINIVIL,ZESTRIL) 5 MG tablet 90 tablet 3     Sig: Take 1 tablet by mouth Daily.        Pharmacy where request should be sent: [facility] - [facility] [address] - [phone] [facility] [phone]      Additional details provided by patient: PATIENT WILL RUN OUT OF ATORVASTATIN ON 3/9/22. PATIENT WOULD LIKE TO KNOW WHAT HE SHOULD DO TO HOLD HIM OVER UNTIL [facility] CAN GET HIS MEDICATION TO HIM.     Does the patient have less than a 3 day supply:  [x] Yes  [] No    Dana Barcenas Rep   03/07/22 12:32 EST

## 2022-05-13 ENCOUNTER — TELEPHONE (OUTPATIENT)
Dept: FAMILY MEDICINE CLINIC | Facility: CLINIC | Age: 67
End: 2022-05-13

## 2022-05-13 NOTE — TELEPHONE ENCOUNTER
GAYATRI IS CALLING TO SEE IF HE HAS HAD HIS PNEUMONIA SHOT YET?  IF SO WHICH ONE?  PREVNAR?   Phone: [phone]

## 2022-06-23 ENCOUNTER — OFFICE VISIT (OUTPATIENT)
Dept: FAMILY MEDICINE CLINIC | Facility: CLINIC | Age: 67
End: 2022-06-23

## 2022-06-23 ENCOUNTER — LAB (OUTPATIENT)
Dept: LAB | Facility: HOSPITAL | Age: 67
End: 2022-06-23

## 2022-06-23 VITALS
RESPIRATION RATE: 20 BRPM | HEART RATE: 85 BPM | TEMPERATURE: 98.6 F | HEIGHT: 68 IN | SYSTOLIC BLOOD PRESSURE: 122 MMHG | WEIGHT: 184.6 LBS | BODY MASS INDEX: 27.98 KG/M2 | DIASTOLIC BLOOD PRESSURE: 74 MMHG

## 2022-06-23 DIAGNOSIS — Z12.5 PROSTATE CANCER SCREENING: ICD-10-CM

## 2022-06-23 DIAGNOSIS — E78.5 HYPERLIPIDEMIA, UNSPECIFIED HYPERLIPIDEMIA TYPE: ICD-10-CM

## 2022-06-23 DIAGNOSIS — I10 ESSENTIAL HYPERTENSION: ICD-10-CM

## 2022-06-23 DIAGNOSIS — R73.03 PREDIABETES: ICD-10-CM

## 2022-06-23 DIAGNOSIS — Z12.5 PROSTATE CANCER SCREENING: Primary | ICD-10-CM

## 2022-06-23 LAB
ALBUMIN SERPL-MCNC: 4.4 G/DL (ref 3.5–5.2)
ALBUMIN/GLOB SERPL: 1.3 G/DL
ALP SERPL-CCNC: 80 U/L (ref 39–117)
ALT SERPL W P-5'-P-CCNC: 17 U/L (ref 1–41)
ANION GAP SERPL CALCULATED.3IONS-SCNC: 9 MMOL/L (ref 5–15)
AST SERPL-CCNC: 22 U/L (ref 1–40)
BILIRUB SERPL-MCNC: 0.6 MG/DL (ref 0–1.2)
BUN SERPL-MCNC: 9 MG/DL (ref 8–23)
BUN/CREAT SERPL: 8.5 (ref 7–25)
CALCIUM SPEC-SCNC: 9.5 MG/DL (ref 8.6–10.5)
CHLORIDE SERPL-SCNC: 99 MMOL/L (ref 98–107)
CHOLEST SERPL-MCNC: 166 MG/DL (ref 0–200)
CO2 SERPL-SCNC: 28 MMOL/L (ref 22–29)
CREAT SERPL-MCNC: 1.06 MG/DL (ref 0.76–1.27)
EGFRCR SERPLBLD CKD-EPI 2021: 76.9 ML/MIN/1.73
GLOBULIN UR ELPH-MCNC: 3.3 GM/DL
GLUCOSE SERPL-MCNC: 97 MG/DL (ref 65–99)
HBA1C MFR BLD: 6 % (ref 4.8–5.6)
HDLC SERPL-MCNC: 43 MG/DL (ref 40–60)
LDLC SERPL CALC-MCNC: 104 MG/DL (ref 0–100)
LDLC/HDLC SERPL: 2.39 {RATIO}
POTASSIUM SERPL-SCNC: 4 MMOL/L (ref 3.5–5.2)
PROT SERPL-MCNC: 7.7 G/DL (ref 6–8.5)
PSA SERPL-MCNC: 0.39 NG/ML (ref 0–4)
SODIUM SERPL-SCNC: 136 MMOL/L (ref 136–145)
TRIGL SERPL-MCNC: 101 MG/DL (ref 0–150)
VLDLC SERPL-MCNC: 19 MG/DL (ref 5–40)

## 2022-06-23 PROCEDURE — 99214 OFFICE O/P EST MOD 30 MIN: CPT | Performed by: FAMILY MEDICINE

## 2022-06-23 PROCEDURE — 80053 COMPREHEN METABOLIC PANEL: CPT

## 2022-06-23 PROCEDURE — G0103 PSA SCREENING: HCPCS

## 2022-06-23 PROCEDURE — 83036 HEMOGLOBIN GLYCOSYLATED A1C: CPT

## 2022-06-23 PROCEDURE — 80061 LIPID PANEL: CPT

## 2022-06-23 RX ORDER — ATORVASTATIN CALCIUM 20 MG/1
20 TABLET, FILM COATED ORAL DAILY
Qty: 90 TABLET | Refills: 3 | Status: SHIPPED | OUTPATIENT
Start: 2022-06-23

## 2022-06-23 RX ORDER — LISINOPRIL 5 MG/1
5 TABLET ORAL DAILY
Qty: 90 TABLET | Refills: 3 | Status: SHIPPED | OUTPATIENT
Start: 2022-06-23

## 2022-06-23 NOTE — PROGRESS NOTES
Follow Up Office Visit      Patient Name: Mp Wen  : 1955   MRN: [id number]     Chief Complaint:    Chief Complaint   Patient presents with   • Hypertension       History of Present Illness: Mp Wen is a 67 y.o. male who is here today to follow up with prediabetes, hypertension hypercholesterolemia.  Patient presents today for follow-up regarding hypertension.  He takes his medication every other day.  He is taking a snap supplement as well.  Patient says he feels great overall.  Patient has had recently elevated blood pressure at his dermatologist.  Patient was advised today of ambulatory readings.  We also discussed his lisinopril medication and decreasing it as needed.    For patient's prediabetes, today we will repeat lab test.  Has been controlled in the past.    Discussed prostate cancer screening.  Also discussed repeating lipid panel.  We will check liver function as well since has been treated with a statin medication.      Review of systems was negative for chest pain      Physical exam: Patient's lung and heart exam was normal.      Subjective        I have reviewed and the following portions of the patient's history were updated as appropriate: past family history, past medical history, past social history, past surgical history and problem list.    Medications:     Current Outpatient Medications:   •  aspirin 81 MG tablet, Take 81 mg by mouth Daily., Disp: , Rfl:   •  atorvastatin (LIPITOR) 20 MG tablet, Take 1 tablet by mouth Daily., Disp: 90 tablet, Rfl: 3  •  cholecalciferol (VITAMIN D3) 25 MCG (1000 UT) tablet, Take 1,000 Units by mouth Daily., Disp: , Rfl:   •  lisinopril (PRINIVIL,ZESTRIL) 5 MG tablet, Take 1 tablet by mouth Daily., Disp: 90 tablet, Rfl: 3  •  meclizine (ANTIVERT) 25 MG tablet, Take 1 tablet by mouth 3 (three) times a day as needed., Disp: , Rfl:   •  sildenafil (REVATIO) 20 MG tablet, Take 20 mg by mouth Daily As Needed., Disp: , Rfl:     Allergies:   No  "Known Allergies    Objective     Physical Exam: Please see above  Vital Signs:   Vitals:    06/23/22 1037   BP: 122/74   BP Location: Left arm   Pulse: 85   Resp: 20   Temp: 98.6 °F (37 °C)   Weight: 83.7 kg (184 lb 9.6 oz)   Height: 172.7 cm (68\")     Body mass index is 28.07 kg/m².          Assessment / Plan      Assessment/Plan:   Diagnoses and all orders for this visit:    1. Prostate cancer screening (Primary)  -     PSA Screen; Future    2. Prediabetes  -     Hemoglobin A1c; Future    3. Essential hypertension  -     Comprehensive Metabolic Panel; Future  -     lisinopril (PRINIVIL,ZESTRIL) 5 MG tablet; Take 1 tablet by mouth Daily.  Dispense: 90 tablet; Refill: 3    4. Hyperlipidemia, unspecified hyperlipidemia type  -     Lipid Panel; Future  -     atorvastatin (LIPITOR) 20 MG tablet; Take 1 tablet by mouth Daily.  Dispense: 90 tablet; Refill: 3       Patient not diabetic.  Prediabetes has been very well controlled on diet.  We will recheck A1c today.  Prostate cancer screening discussed with the patient.    Hypertension well controlled today.  Recommend stopping lisinopril and monitoring blood pressure daily.  May need 2.5 mg dose instead.  Patient to call with readings in 1 month.    Lipids controlled per last lab results, will repeat today.    Patient has been erroneously marked as diabetic. Based on the available clinical information, he does not have diabetes and should therefore be excluded from diabetic health maintenance and quality measures for the remainder of the reporting period.    Follow Up:   Return in about 5 months (around 11/23/2022) for Medicare Wellness.    Howie Raymundo DO  [facility]   "

## 2022-11-22 ENCOUNTER — OFFICE VISIT (OUTPATIENT)
Dept: FAMILY MEDICINE CLINIC | Facility: CLINIC | Age: 67
End: 2022-11-22

## 2022-11-22 VITALS
RESPIRATION RATE: 21 BRPM | TEMPERATURE: 97.8 F | WEIGHT: 182.4 LBS | HEIGHT: 68 IN | BODY MASS INDEX: 27.65 KG/M2 | DIASTOLIC BLOOD PRESSURE: 76 MMHG | SYSTOLIC BLOOD PRESSURE: 112 MMHG | OXYGEN SATURATION: 98 % | HEART RATE: 82 BPM

## 2022-11-22 DIAGNOSIS — R39.89 SENSATION OF PRESSURE IN BLADDER AREA: ICD-10-CM

## 2022-11-22 DIAGNOSIS — M54.50 ACUTE BILATERAL LOW BACK PAIN WITHOUT SCIATICA: Primary | ICD-10-CM

## 2022-11-22 DIAGNOSIS — N41.0 ACUTE PROSTATITIS: ICD-10-CM

## 2022-11-22 LAB
BILIRUB BLD-MCNC: NEGATIVE MG/DL
CLARITY, POC: CLEAR
COLOR UR: YELLOW
EXPIRATION DATE: NORMAL
GLUCOSE UR STRIP-MCNC: NEGATIVE MG/DL
KETONES UR QL: NEGATIVE
LEUKOCYTE EST, POC: NEGATIVE
Lab: NORMAL
NITRITE UR-MCNC: NEGATIVE MG/ML
PH UR: 6 [PH] (ref 5–8)
PROT UR STRIP-MCNC: NEGATIVE MG/DL
RBC # UR STRIP: NEGATIVE /UL
SP GR UR: 1.03 (ref 1–1.03)
UROBILINOGEN UR QL: NORMAL

## 2022-11-22 PROCEDURE — 81003 URINALYSIS AUTO W/O SCOPE: CPT | Performed by: NURSE PRACTITIONER

## 2022-11-22 PROCEDURE — 99214 OFFICE O/P EST MOD 30 MIN: CPT | Performed by: NURSE PRACTITIONER

## 2022-11-22 RX ORDER — SULFAMETHOXAZOLE AND TRIMETHOPRIM 800; 160 MG/1; MG/1
1 TABLET ORAL 2 TIMES DAILY
Qty: 14 TABLET | Refills: 0 | Status: SHIPPED | OUTPATIENT
Start: 2022-11-22 | End: 2022-12-01

## 2022-11-22 RX ORDER — TAMSULOSIN HYDROCHLORIDE 0.4 MG/1
1 CAPSULE ORAL DAILY
Qty: 14 CAPSULE | Refills: 0 | Status: SHIPPED | OUTPATIENT
Start: 2022-11-22 | End: 2022-12-01

## 2022-12-01 ENCOUNTER — TELEPHONE (OUTPATIENT)
Dept: FAMILY MEDICINE CLINIC | Facility: CLINIC | Age: 67
End: 2022-12-01

## 2022-12-01 ENCOUNTER — OFFICE VISIT (OUTPATIENT)
Dept: FAMILY MEDICINE CLINIC | Facility: CLINIC | Age: 67
End: 2022-12-01

## 2022-12-01 VITALS
HEART RATE: 86 BPM | HEIGHT: 68 IN | OXYGEN SATURATION: 98 % | BODY MASS INDEX: 28.19 KG/M2 | WEIGHT: 186 LBS | DIASTOLIC BLOOD PRESSURE: 70 MMHG | TEMPERATURE: 97.5 F | SYSTOLIC BLOOD PRESSURE: 120 MMHG | RESPIRATION RATE: 20 BRPM

## 2022-12-01 DIAGNOSIS — T83.728S EXPOSURE OF OTHER IMPLANTED MESH INTO ORGAN OR TISSUE, SEQUELA: ICD-10-CM

## 2022-12-01 DIAGNOSIS — Z00.00 MEDICARE ANNUAL WELLNESS VISIT, SUBSEQUENT: Primary | ICD-10-CM

## 2022-12-01 DIAGNOSIS — G89.29 CHRONIC BILATERAL LOW BACK PAIN WITHOUT SCIATICA: ICD-10-CM

## 2022-12-01 DIAGNOSIS — M54.50 CHRONIC BILATERAL LOW BACK PAIN WITHOUT SCIATICA: ICD-10-CM

## 2022-12-01 DIAGNOSIS — R10.30 LOWER ABDOMINAL PAIN: ICD-10-CM

## 2022-12-01 PROCEDURE — 1170F FXNL STATUS ASSESSED: CPT | Performed by: FAMILY MEDICINE

## 2022-12-01 PROCEDURE — 99213 OFFICE O/P EST LOW 20 MIN: CPT | Performed by: FAMILY MEDICINE

## 2022-12-01 PROCEDURE — G0439 PPPS, SUBSEQ VISIT: HCPCS | Performed by: FAMILY MEDICINE

## 2022-12-01 PROCEDURE — 1159F MED LIST DOCD IN RCRD: CPT | Performed by: FAMILY MEDICINE

## 2022-12-01 NOTE — PROGRESS NOTES
The ABCs of the Annual Wellness Visit  Subsequent Medicare Wellness Visit    Chief Complaint   Patient presents with   • Medicare Wellness-subsequent      Subjective    History of Present Illness:  Mp Wen is a 67 y.o. male who presents for a Subsequent Medicare Wellness Visit.    Patient has history of mesh repair 1997.  Patient says in the area where the mesh was implanted he has recurrent pain.  Patient says he also has swelling and redness.  Patient says that starting Bactrim recently made the pain better so he was wondering if it is possibly infected.  We discussed options such as seeing a general surgeon or getting his CAT scan today.  For monetary reasons, we decided to go to general surgery for second opinion about the mesh implant and if it is causing his pain.  May get a CT if needed    Patient also has low back pain.  Patient says at times he bends over and is unable to stand up.  Once he does stand up he then has anterior right hip pain around the same area where the mesh is.  Patient is wondering if this is related.        The following portions of the patient's history were reviewed and   updated as appropriate: allergies, current medications, past family history, past medical history, past social history, past surgical history and problem list.    Compared to one year ago, the patient feels his physical   health is better.    Compared to one year ago, the patient feels his mental   health is the same.    Recent Hospitalizations:  He was not admitted to the hospital during the last year.       Current Medical Providers:  Patient Care Team:  Howie Raymundo DO as PCP - General (Family Medicine)    Outpatient Medications Prior to Visit   Medication Sig Dispense Refill   • aspirin 81 MG tablet Take 81 mg by mouth Daily.     • atorvastatin (LIPITOR) 20 MG tablet Take 1 tablet by mouth Daily. 90 tablet 3   • cholecalciferol (VITAMIN D3) 25 MCG (1000 UT) tablet Take 1,000 Units by mouth Daily.     •  Diclofenac Sodium (VOLTAREN) 1 % gel gel Apply 4 g topically to the appropriate area as directed 4 (Four) Times a Day As Needed (intermittent back pain). 100 g 0   • lisinopril (PRINIVIL,ZESTRIL) 5 MG tablet Take 1 tablet by mouth Daily. 90 tablet 3   • meclizine (ANTIVERT) 25 MG tablet Take 1 tablet by mouth 3 (three) times a day as needed.     • sildenafil (REVATIO) 20 MG tablet Take 20 mg by mouth Daily As Needed.     • tamsulosin (FLOMAX) 0.4 MG capsule 24 hr capsule Take 1 capsule by mouth Daily. 14 capsule 0   • sulfamethoxazole-trimethoprim (Bactrim DS) 800-160 MG per tablet Take 1 tablet by mouth 2 (Two) Times a Day. 14 tablet 0     No facility-administered medications prior to visit.       No opioid medication identified on active medication list. I have reviewed chart for other potential  high risk medication/s and harmful drug interactions in the elderly.          Aspirin is on active medication list. Aspirin use is indicated based on review of current medical condition/s. Pros and cons of this therapy have been discussed today. Benefits of this medication outweigh potential harm.  Patient has been encouraged to continue taking this medication.  .      Patient Active Problem List   Diagnosis   • Abnormal electrocardiogram   • Benign prostatic hypertrophy without urinary obstruction   • Dysphagia   • Erectile dysfunction of nonorganic origin   • Gastroesophageal reflux disease   • Hepatitis C virus infection   • Essential hypertension   • Low back pain   • Retinal detachment   • Vertigo   • Labyrinthitis   • Hepatitis C   • ED (erectile dysfunction) of non-organic origin   • Syncope and collapse   • Palpitations   • Mixed hyperlipidemia   • Skin lesion   • Prostate cancer screening   • Prediabetes     Advance Care Planning  Advance Directive is not on file.  ACP discussion was held with the patient during this visit. Patient has an advance directive (not in EMR), copy requested.    Review of Systems  "  Respiratory: Negative for shortness of breath.    Cardiovascular: Positive for chest pain.   Gastrointestinal: Positive for abdominal pain.   Musculoskeletal: Positive for arthralgias and back pain. Negative for joint swelling.        Objective    Vitals:    12/01/22 1105   BP: 120/70   Pulse: 86   Resp: 20   Temp: 97.5 °F (36.4 °C)   SpO2: 98%   Weight: 84.4 kg (186 lb)   Height: 172.7 cm (67.99\")     Estimated body mass index is 28.29 kg/m² as calculated from the following:    Height as of this encounter: 172.7 cm (67.99\").    Weight as of this encounter: 84.4 kg (186 lb).    BMI is >= 25 and <30. (Overweight) The following options were offered after discussion;: exercise counseling/recommendations and nutrition counseling/recommendations      Does the patient have evidence of cognitive impairment? No    Physical Exam  Vitals and nursing note reviewed.   Constitutional:       Appearance: Normal appearance.   HENT:      Head: Normocephalic.      Right Ear: External ear normal.      Left Ear: External ear normal.      Nose: Nose normal.      Mouth/Throat:      Mouth: Mucous membranes are moist.   Eyes:      Pupils: Pupils are equal, round, and reactive to light.   Cardiovascular:      Rate and Rhythm: Normal rate and regular rhythm.      Heart sounds: No murmur heard.  Pulmonary:      Effort: Pulmonary effort is normal.   Abdominal:      General: Abdomen is flat.      Comments: Tenderness pill palpation of inguinal/lower right abdominal area.   Musculoskeletal:         General: No swelling.      Cervical back: Neck supple.      Comments: Somatic function noted in lumbar region.   Skin:     General: Skin is warm.      Capillary Refill: Capillary refill takes less than 2 seconds.   Neurological:      General: No focal deficit present.      Mental Status: He is alert.   Psychiatric:         Mood and Affect: Mood normal.                 HEALTH RISK ASSESSMENT    Smoking Status:  Social History     Tobacco Use "   Smoking Status Former   • Packs/day: 0.50   • Years: 28.00   • Pack years: 14.00   • Types: Cigarettes   • Quit date: 1996   • Years since quittin.6   Smokeless Tobacco Never     Alcohol Consumption:  Social History     Substance and Sexual Activity   Alcohol Use No     Fall Risk Screen:    VIJAY Fall Risk Assessment was completed, and patient is at LOW risk for falls.Assessment completed on:2022    Depression Screening:  PHQ-2/PHQ-9 Depression Screening 2022   Retired PHQ-9 Total Score -   Retired Total Score -   Little Interest or Pleasure in Doing Things 0-->not at all   Feeling Down, Depressed or Hopeless 0-->not at all   PHQ-9: Brief Depression Severity Measure Score 0       Health Habits and Functional and Cognitive Screening:  Functional & Cognitive Status 2022   Do you have difficulty preparing food and eating? No   Do you have difficulty bathing yourself, getting dressed or grooming yourself? No   Do you have difficulty using the toilet? No   Do you have difficulty moving around from place to place? No   Do you have trouble with steps or getting out of a bed or a chair? No   Current Diet Well Balanced Diet   Dental Exam Up to date   Eye Exam Up to date   Exercise (times per week) 3 times per week   Current Exercises Include Walking   Current Exercise Activities Include -   Do you need help using the phone?  No   Are you deaf or do you have serious difficulty hearing?  No   Do you need help with transportation? No   Do you need help shopping? No   Do you need help preparing meals?  No   Do you need help with housework?  No   Do you need help with laundry? No   Do you need help taking your medications? No   Do you need help managing money? No   Do you ever drive or ride in a car without wearing a seat belt? No   Have you felt unusual stress, anger or loneliness in the last month? No   Who do you live with? Sibling   If you need help, do you have trouble finding someone available to  you? No   Have you been bothered in the last four weeks by sexual problems? No   Do you have difficulty concentrating, remembering or making decisions? No       Age-appropriate Screening Schedule:  Refer to the list below for future screening recommendations based on patient's age, sex and/or medical conditions. Orders for these recommended tests are listed in the plan section. The patient has been provided with a written plan.    Health Maintenance   Topic Date Due   • LIPID PANEL  06/23/2023   • TDAP/TD VACCINES (2 - Td or Tdap) 02/21/2028   • INFLUENZA VACCINE  Completed   • ZOSTER VACCINE  Completed              Assessment & Plan   CMS Preventative Services Quick Reference  Risk Factors Identified During Encounter  Immunizations Discussed/Encouraged (specific Immunizations; Hepatitis B Vaccine/Series  Inadequate Social Support, Isolation, Loneliness, Lack of Transportation, Financial Difficulties, or Caregiver Stress   Inactivity/Sedentary  Obesity/Overweight   The above risks/problems have been discussed with the patient.  Follow up actions/plans if indicated are seen below in the Assessment/Plan Section.  Pertinent information has been shared with the patient in the After Visit Summary.    Diagnoses and all orders for this visit:    1. Medicare annual wellness visit, subsequent (Primary)    2. Exposure of other implanted mesh into organ or tissue, sequela  -     Ambulatory Referral to General Surgery  -     CT Abdomen Pelvis Without Contrast; Future    3. Lower abdominal pain  -     Ambulatory Referral to General Surgery  -     CT Abdomen Pelvis Without Contrast; Future    4. Chronic bilateral low back pain without sciatica  -     XR Spine Lumbar 2 or 3 View; Future    Patient presented with possibly 2 issues.  He did have a implanted mesh which could cause some pain and irritation area of concern.  However, I do not think it is causing his back pain.  Patient may have actual back arthritis or spondylolysis  causing pain.  We will get x-rays today.  Patient has not been to physical therapy for this issue.  Patient is under the impression that it could possibly be a his mesh implant causing some of the aggravation in the inguinal area and his back pain.  My guess is that he is having psoas issues causing some of his low back pain or arthritis.  I do not think the back pain is related to the mesh.  Patient can see a general surgeon and discussed the mesh implant to see if it is causing swelling and irritation in the anterior aspect.  We will get an x-ray today to look at his back.  In the future we may have patient go to physical therapy or consider getting an MRI.      Will perform blood work at his 6-month follow-up in the summer.    Follow Up:   Return in about 6 months (around 6/1/2023) for Recheck, xray.     An After Visit Summary and PPPS were made available to the patient.

## 2022-12-30 ENCOUNTER — HOSPITAL ENCOUNTER (OUTPATIENT)
Dept: CT IMAGING | Facility: HOSPITAL | Age: 67
Discharge: HOME | End: 2022-12-30
Admitting: FAMILY MEDICINE

## 2022-12-30 DIAGNOSIS — R10.30 LOWER ABDOMINAL PAIN: ICD-10-CM

## 2022-12-30 DIAGNOSIS — T83.728S EXPOSURE OF OTHER IMPLANTED MESH INTO ORGAN OR TISSUE, SEQUELA: ICD-10-CM

## 2022-12-30 PROCEDURE — 74176 CT ABD & PELVIS W/O CONTRAST: CPT

## 2023-05-08 ENCOUNTER — TELEPHONE (OUTPATIENT)
Dept: FAMILY MEDICINE CLINIC | Facility: CLINIC | Age: 68
End: 2023-05-08

## 2023-05-08 ENCOUNTER — TELEPHONE (OUTPATIENT)
Dept: FAMILY MEDICINE CLINIC | Facility: CLINIC | Age: 68
End: 2023-05-08
Payer: MEDICARE

## 2023-05-09 ENCOUNTER — TELEPHONE (OUTPATIENT)
Dept: FAMILY MEDICINE CLINIC | Facility: CLINIC | Age: 68
End: 2023-05-09
Payer: MEDICARE

## 2023-05-09 NOTE — TELEPHONE ENCOUNTER
Caller: Mp Wen    Relationship to patient: Self    Best call back number: [phone]    Patient is needing: PATIENT WAS RETURNING VISHAL'S CALL. PLEASE CALL AFTER 2:30PM AND SPEAK TO PATIENT'S WIFE.

## 2023-05-12 ENCOUNTER — TELEPHONE (OUTPATIENT)
Dept: FAMILY MEDICINE CLINIC | Facility: CLINIC | Age: 68
End: 2023-05-12
Payer: MEDICARE
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
## 2023-05-18 ENCOUNTER — TELEPHONE (OUTPATIENT)
Dept: FAMILY MEDICINE CLINIC | Facility: CLINIC | Age: 68
End: 2023-05-18
Payer: MEDICARE

## 2023-05-18 NOTE — TELEPHONE ENCOUNTER
CALLED TO SCHEDULE PATIENT FOR CONSULT. NEEDING RECORDS TO REVIEW AND THEY WILL CALL ME BACK WITH APT.  FAXED LAST OFFICE NOTE, CT RESULTS AND REFERRAL.

## 2023-05-19 ENCOUNTER — TELEPHONE (OUTPATIENT)
Dept: FAMILY MEDICINE CLINIC | Facility: CLINIC | Age: 68
End: 2023-05-19
Payer: MEDICARE

## 2023-05-19 NOTE — TELEPHONE ENCOUNTER
CALLED TO SEE IF PATIENT HAD BEEN SCHEDULED. FAXED REFERRAL, CT REPORT, OFFICE NOTE.  STATES THAT THEY DID RECEIVE BUT DOCTOR HOWARD HAS TO REVIEW AND THEY WILL CALL ME BACK TO SCHEDULE

## 2023-05-22 ENCOUNTER — TELEPHONE (OUTPATIENT)
Dept: FAMILY MEDICINE CLINIC | Facility: CLINIC | Age: 68
End: 2023-05-22
Payer: MEDICARE

## 2023-05-22 NOTE — TELEPHONE ENCOUNTER
CALLED PATIENT TO MAKE AWARE THAT HIS APPT WITH [facility] IS ON 6-20-23 AT 11 AM.  NO  ANSWER, LEFT DETAILED VM WITH APT INFORMATION. ALSO LEFT THEIR PHONE NUMBER IF HE HAD ANY QUESTIONS.  OK HUB TO READ

## 2023-06-01 ENCOUNTER — OFFICE VISIT (OUTPATIENT)
Dept: FAMILY MEDICINE CLINIC | Facility: CLINIC | Age: 68
End: 2023-06-01

## 2023-06-01 VITALS
HEART RATE: 77 BPM | BODY MASS INDEX: 28.02 KG/M2 | RESPIRATION RATE: 17 BRPM | SYSTOLIC BLOOD PRESSURE: 122 MMHG | TEMPERATURE: 98 F | DIASTOLIC BLOOD PRESSURE: 82 MMHG | WEIGHT: 184.2 LBS

## 2023-06-01 DIAGNOSIS — I44.4 LEFT ANTERIOR FASCICULAR BLOCK: ICD-10-CM

## 2023-06-01 DIAGNOSIS — R94.31 Q WAVES SUGGESTIVE OF PREVIOUS MYOCARDIAL INFARCTION: ICD-10-CM

## 2023-06-01 DIAGNOSIS — R07.9 CHEST PAIN, UNSPECIFIED TYPE: Primary | ICD-10-CM

## 2023-06-01 PROCEDURE — 3079F DIAST BP 80-89 MM HG: CPT | Performed by: FAMILY MEDICINE

## 2023-06-01 PROCEDURE — 3074F SYST BP LT 130 MM HG: CPT | Performed by: FAMILY MEDICINE

## 2023-06-01 PROCEDURE — 99213 OFFICE O/P EST LOW 20 MIN: CPT | Performed by: FAMILY MEDICINE

## 2023-06-01 PROCEDURE — 93000 ELECTROCARDIOGRAM COMPLETE: CPT | Performed by: FAMILY MEDICINE

## 2023-06-01 PROCEDURE — 1159F MED LIST DOCD IN RCRD: CPT | Performed by: FAMILY MEDICINE

## 2023-06-01 PROCEDURE — 1160F RVW MEDS BY RX/DR IN RCRD: CPT | Performed by: FAMILY MEDICINE

## 2023-06-01 NOTE — PROGRESS NOTES
Follow Up Office Visit      Patient Name: Mp Wen  : 1955   MRN: [id number]     Chief Complaint:    Chief Complaint   Patient presents with   • Follow-up     hernia       History of Present Illness: Mp Wen is a 68 y.o. male who is here today to follow up with chest discomfort after covid shots. He has a known hernia and has apt coming up. He has been looking into possible covid shot side effects. He had a covid shot last month. He says that the covid shot didn't bother him other than injection site and at some point he started feeling pain in his left shoulder and had trouble moving his shoulder. He says he tried to move it around a lot to help it. It feels sore. It is sore to touch but can be sore without touching it. He says it felt swollen/sore. His exercise tolerance is good. He says that even when it gets better that he can still feel it. No shortness breath and no radiation of pain.  Patient has known first degree of block and has been noted to have a fascicular block in the past.  Previous EKGs show Q waves.  Today patient is reporting persistent chest pain.      He also has a hernia which he would like to get 6 and has an appointment as noted above      Physical exam: Heart exam RRR, no murmurs.  Mood and affect appropriate.  Rib somatic dysfunction noted.      Subjective        I have reviewed and the following portions of the patient's history were updated as appropriate: past family history, past medical history, past social history, past surgical history and problem list.    Medications:     Current Outpatient Medications:   •  aspirin 81 MG tablet, Take 1 tablet by mouth Daily., Disp: , Rfl:   •  atorvastatin (LIPITOR) 20 MG tablet, Take 1 tablet by mouth Daily., Disp: 90 tablet, Rfl: 3  •  cholecalciferol (VITAMIN D3) 25 MCG (1000 UT) tablet, Take 1 tablet by mouth Daily., Disp: , Rfl:   •  lisinopril (PRINIVIL,ZESTRIL) 5 MG tablet, Take 1 tablet by mouth Daily., Disp: 90  tablet, Rfl: 3  •  sildenafil (REVATIO) 20 MG tablet, Take 1 tablet by mouth Daily As Needed., Disp: , Rfl:   •  Diclofenac Sodium (VOLTAREN) 1 % gel gel, Apply 4 g topically to the appropriate area as directed 4 (Four) Times a Day As Needed (intermittent back pain). (Patient not taking: Reported on 6/1/2023), Disp: 100 g, Rfl: 0  •  meclizine (ANTIVERT) 25 MG tablet, Take 1 tablet by mouth 3 (three) times a day as needed. (Patient not taking: Reported on 6/1/2023), Disp: , Rfl:     Allergies:   No Known Allergies    Objective     Physical Exam: Please see above  Vital Signs:   Vitals:    06/01/23 1012   BP: 122/82   Pulse: 77   Resp: 17   Temp: 98 °F (36.7 °C)   TempSrc: Infrared   Weight: 83.6 kg (184 lb 3.2 oz)     Body mass index is 28.02 kg/m².          Assessment / Plan      Assessment/Plan:   Diagnoses and all orders for this visit:    1. Chest pain, unspecified type (Primary)  -     Adult Transthoracic Echo Complete W/ Cont if Necessary Per Protocol; Future  -     ECG 12 Lead    2. Left anterior fascicular block  -     Ambulatory Referral to Cardiology    3. Q waves suggestive of previous myocardial infarction  -     Ambulatory Referral to Cardiology    Will refer to cardiology for further work-up regarding left anterior fascicular block, first-degree block, Q waves and chest pain.  Went ahead and ordered a echocardiogram due to patient's persistent chest pain.  Unsure at this point if this soreness in his chest is related to his heart or if this is musculoskeletal issue.  He does have some rib somatic dysfunction noted on exam.  Would prefer patient to have his heart worked up by cardiology and then if he still has chest discomfort, we can address his musculoskeletal issues at another visit.    Patient given ER precautions given chest pain.  EKG reviewed with patient.  Patient referred to cardiology and echo.      ECG 12 Lead    Date/Time: 6/1/2023 12:24 PM  Performed by: Howie Raymundo DO  Authorized by:  Howie Raymundo DO   Comparison: compared with previous ECG from 5/24/2021  Comparison to previous ECG: New fascicular block noted  Rhythm: sinus rhythm  Ectopy: infrequent PVCs  Rate: normal  Conduction: left anterior fascicular block  Q waves: V1 and V2    ST Segments: ST segments normal  T Waves: T waves normal  QRS axis: normal  Other: no other findings    Clinical impression: abnormal EKG            Follow Up:   Return if symptoms worsen or fail to improve, for Annual.    Howie Raymundo DO  [facility] [place]

## 2023-06-07 DIAGNOSIS — E78.5 HYPERLIPIDEMIA, UNSPECIFIED HYPERLIPIDEMIA TYPE: ICD-10-CM

## 2023-06-07 RX ORDER — ATORVASTATIN CALCIUM 20 MG/1
20 TABLET, FILM COATED ORAL DAILY
Qty: 90 TABLET | Refills: 3 | Status: SHIPPED | OUTPATIENT
Start: 2023-06-07

## 2023-06-07 NOTE — TELEPHONE ENCOUNTER
Caller: Mp Wen    Relationship: Self    Best call back number: [phone]    Requested Prescriptions:   Requested Prescriptions     Pending Prescriptions Disp Refills    atorvastatin (LIPITOR) 20 MG tablet 90 tablet 3     Sig: Take 1 tablet by mouth Daily.        Pharmacy where request should be sent:  [facility] [phone]    Last office visit with prescribing clinician: 6/1/2023   Last telemedicine visit with prescribing clinician: Visit date not found   Next office visit with prescribing clinician: 12/15/2023     Additional details provided by patient: PATIENT HAS 5 DAYS LEFT OF MEDICATION    Does the patient have less than a 3 day supply:  [] Yes  [x] No    Would you like a call back once the refill request has been completed: [x] Yes [] No    If the office needs to give you a call back, can they leave a voicemail: [x] Yes [] No    Dana Stewart   06/07/23 10:30 EDT         
home care with physical therapy

## 2023-06-12 ENCOUNTER — HOSPITAL ENCOUNTER (OUTPATIENT)
Dept: CARDIOLOGY | Facility: HOSPITAL | Age: 68
Discharge: HOME | End: 2023-06-12
Admitting: FAMILY MEDICINE
Payer: MEDICARE

## 2023-06-12 ENCOUNTER — TELEPHONE (OUTPATIENT)
Dept: CARDIOLOGY | Facility: CLINIC | Age: 68
End: 2023-06-12
Payer: MEDICARE

## 2023-06-12 VITALS — BODY MASS INDEX: 28.88 KG/M2 | WEIGHT: 184 LBS | HEIGHT: 67 IN

## 2023-06-12 DIAGNOSIS — R07.9 CHEST PAIN, UNSPECIFIED TYPE: ICD-10-CM

## 2023-06-12 LAB
BH CV ECHO MEAS - AO MAX PG: 7.4 MMHG
BH CV ECHO MEAS - AO MEAN PG: 4 MMHG
BH CV ECHO MEAS - AO ROOT DIAM: 3.1 CM
BH CV ECHO MEAS - AO V2 MAX: 136 CM/SEC
BH CV ECHO MEAS - AO V2 VTI: 25.5 CM
BH CV ECHO MEAS - AVA(I,D): 2.6 CM2
BH CV ECHO MEAS - EDV(CUBED): 85.2 ML
BH CV ECHO MEAS - EDV(MOD-SP2): 54.2 ML
BH CV ECHO MEAS - EDV(MOD-SP4): 79.9 ML
BH CV ECHO MEAS - EF(MOD-BP): 55.7 %
BH CV ECHO MEAS - EF(MOD-SP2): 34.5 %
BH CV ECHO MEAS - EF(MOD-SP4): 64.2 %
BH CV ECHO MEAS - ESV(CUBED): 6.9 ML
BH CV ECHO MEAS - ESV(MOD-SP2): 35.5 ML
BH CV ECHO MEAS - ESV(MOD-SP4): 28.6 ML
BH CV ECHO MEAS - FS: 56.8 %
BH CV ECHO MEAS - IVS/LVPW: 0.88 CM
BH CV ECHO MEAS - IVSD: 0.7 CM
BH CV ECHO MEAS - LA DIMENSION: 3.2 CM
BH CV ECHO MEAS - LAT PEAK E' VEL: 13.4 CM/SEC
BH CV ECHO MEAS - LV MASS(C)D: 100.6 GRAMS
BH CV ECHO MEAS - LV MAX PG: 5.4 MMHG
BH CV ECHO MEAS - LV MEAN PG: 3 MMHG
BH CV ECHO MEAS - LV V1 MAX: 116 CM/SEC
BH CV ECHO MEAS - LV V1 VTI: 21.1 CM
BH CV ECHO MEAS - LVIDD: 4.4 CM
BH CV ECHO MEAS - LVIDS: 1.9 CM
BH CV ECHO MEAS - LVOT AREA: 3.1 CM2
BH CV ECHO MEAS - LVOT DIAM: 2 CM
BH CV ECHO MEAS - LVPWD: 0.8 CM
BH CV ECHO MEAS - MED PEAK E' VEL: 6.9 CM/SEC
BH CV ECHO MEAS - MV A MAX VEL: 82.8 CM/SEC
BH CV ECHO MEAS - MV DEC SLOPE: 323 CM/SEC2
BH CV ECHO MEAS - MV DEC TIME: 0.2 MSEC
BH CV ECHO MEAS - MV E MAX VEL: 90.7 CM/SEC
BH CV ECHO MEAS - MV E/A: 1.1
BH CV ECHO MEAS - MV MAX PG: 3.1 MMHG
BH CV ECHO MEAS - MV MEAN PG: 2 MMHG
BH CV ECHO MEAS - MV P1/2T: 81.4 MSEC
BH CV ECHO MEAS - MV V2 VTI: 23 CM
BH CV ECHO MEAS - MVA(P1/2T): 2.7 CM2
BH CV ECHO MEAS - MVA(VTI): 2.9 CM2
BH CV ECHO MEAS - PA ACC TIME: 0.14 SEC
BH CV ECHO MEAS - SV(LVOT): 66.3 ML
BH CV ECHO MEAS - SV(MOD-SP2): 18.7 ML
BH CV ECHO MEAS - SV(MOD-SP4): 51.3 ML
BH CV ECHO MEAS - TAPSE (>1.6): 2.5 CM
BH CV ECHO MEASUREMENTS AVERAGE E/E' RATIO: 8.94
BH CV VAS BP RIGHT ARM: NORMAL MMHG
BH CV XLRA - RV BASE: 2.3 CM
BH CV XLRA - RV LENGTH: 6.1 CM
BH CV XLRA - RV MID: 2 CM
BH CV XLRA - TDI S': 11 CM/SEC
LEFT ATRIUM VOLUME INDEX: 17.7 ML/M2

## 2023-06-12 PROCEDURE — 93306 TTE W/DOPPLER COMPLETE: CPT

## 2023-06-12 PROCEDURE — 93306 TTE W/DOPPLER COMPLETE: CPT | Performed by: INTERNAL MEDICINE

## 2023-09-05 DIAGNOSIS — I10 ESSENTIAL HYPERTENSION: ICD-10-CM

## 2023-09-05 RX ORDER — LISINOPRIL 5 MG/1
5 TABLET ORAL DAILY
Qty: 90 TABLET | Refills: 3 | Status: SHIPPED | OUTPATIENT
Start: 2023-09-05

## 2023-12-15 ENCOUNTER — OFFICE VISIT (OUTPATIENT)
Dept: FAMILY MEDICINE CLINIC | Facility: CLINIC | Age: 68
End: 2023-12-15
Payer: MEDICARE

## 2023-12-15 ENCOUNTER — LAB (OUTPATIENT)
Dept: LAB | Facility: HOSPITAL | Age: 68
End: 2023-12-15
Payer: MEDICARE

## 2023-12-15 VITALS
OXYGEN SATURATION: 98 % | DIASTOLIC BLOOD PRESSURE: 78 MMHG | TEMPERATURE: 99.5 F | SYSTOLIC BLOOD PRESSURE: 124 MMHG | BODY MASS INDEX: 29.03 KG/M2 | HEIGHT: 67 IN | WEIGHT: 185 LBS | HEART RATE: 74 BPM

## 2023-12-15 DIAGNOSIS — M25.512 CHRONIC LEFT SHOULDER PAIN: ICD-10-CM

## 2023-12-15 DIAGNOSIS — I10 ESSENTIAL HYPERTENSION: ICD-10-CM

## 2023-12-15 DIAGNOSIS — N52.9 ERECTILE DYSFUNCTION, UNSPECIFIED ERECTILE DYSFUNCTION TYPE: ICD-10-CM

## 2023-12-15 DIAGNOSIS — E78.5 HYPERLIPIDEMIA, UNSPECIFIED HYPERLIPIDEMIA TYPE: ICD-10-CM

## 2023-12-15 DIAGNOSIS — G89.29 CHRONIC LEFT SHOULDER PAIN: ICD-10-CM

## 2023-12-15 DIAGNOSIS — E66.3 OVERWEIGHT: ICD-10-CM

## 2023-12-15 DIAGNOSIS — Z12.5 PROSTATE CANCER SCREENING: ICD-10-CM

## 2023-12-15 DIAGNOSIS — Z00.00 MEDICARE ANNUAL WELLNESS VISIT, SUBSEQUENT: Primary | ICD-10-CM

## 2023-12-15 RX ORDER — TRIAMCINOLONE ACETONIDE 40 MG/ML
80 INJECTION, SUSPENSION INTRA-ARTICULAR; INTRAMUSCULAR
Status: COMPLETED | OUTPATIENT
Start: 2023-12-15 | End: 2023-12-15

## 2023-12-15 RX ORDER — TADALAFIL 10 MG/1
10 TABLET ORAL DAILY PRN
Qty: 30 TABLET | Refills: 2 | Status: SHIPPED | OUTPATIENT
Start: 2023-12-15

## 2023-12-15 RX ORDER — ATORVASTATIN CALCIUM 20 MG/1
TABLET, FILM COATED ORAL
COMMUNITY
Start: 2023-12-12 | End: 2023-12-15 | Stop reason: ALTCHOICE

## 2023-12-15 RX ORDER — A/SINGAPORE/GP1908/2015 IVR-180 (AN A/MICHIGAN/45/2015 (H1N1)PDM09-LIKE VIRUS, A/HONG KONG/4801/2014, NYMC X-263B (H3N2) (AN A/HONG KONG/4801/2014-LIKE VIRUS), AND B/BRISBANE/60/2008, WILD TYPE (A B/BRISBANE/60/2008-LIKE VIRUS) 15; 15; 15 UG/.5ML; UG/.5ML; UG/.5ML
INJECTION, SUSPENSION INTRAMUSCULAR
COMMUNITY
Start: 2023-09-20 | End: 2023-12-15

## 2023-12-15 RX ADMIN — TRIAMCINOLONE ACETONIDE 80 MG: 40 INJECTION, SUSPENSION INTRA-ARTICULAR; INTRAMUSCULAR at 11:56

## 2023-12-15 NOTE — PROGRESS NOTES
The ABCs of the Annual Wellness Visit  Subsequent Medicare Wellness Visit    Subjective    Mp Wen is a 68 y.o. male who presents for a Subsequent Medicare Wellness Visit.    The following portions of the patient's history were reviewed and   updated as appropriate: allergies, current medications, past family history, past medical history, past social history, past surgical history, and problem list.    Compared to one year ago, the patient feels his physical   health is better.    Compared to one year ago, the patient feels his mental   health is better.    Recent Hospitalizations:  He was not admitted to the hospital during the last year.       Current Medical Providers:  Patient Care Team:  Howie Raymundo DO as PCP - General (Family Medicine)  Howie Raymundo DO as Consulting Physician (Family Medicine)  Omar Smalls MD as Cardiologist (Cardiology)    Outpatient Medications Prior to Visit   Medication Sig Dispense Refill    aspirin 81 MG tablet Take 1 tablet by mouth Daily.      atorvastatin (LIPITOR) 40 MG tablet Take 1 tablet by mouth Every Night. 90 tablet 3    cholecalciferol (VITAMIN D3) 25 MCG (1000 UT) tablet Take 1 tablet by mouth Daily.      lisinopril (PRINIVIL,ZESTRIL) 5 MG tablet Take 1 tablet by mouth Daily. 90 tablet 3    atorvastatin (LIPITOR) 20 MG tablet       Fluad Quadrivalent 0.5 ML prefilled syringe injection       docusate sodium (Colace) 100 MG capsule Take 1 capsule by mouth Every 12 (Twelve) Hours. 30 capsule 0    meclizine (ANTIVERT) 25 MG tablet Take 1 tablet by mouth 3 (Three) Times a Day As Needed. (Patient not taking: Reported on 12/15/2023)      ondansetron (ZOFRAN) 4 MG tablet Take 1 tablet by mouth Every 6 (Six) Hours As Needed. 12 tablet 0    oxyCODONE-acetaminophen (Percocet) 5-325 MG per tablet Take 1 tablet by mouth Every 4 (Four) to 6 (Six) Hours As Needed for Pain. 12 tablet 0    sildenafil (REVATIO) 20 MG tablet Take 1 tablet by mouth Daily As Needed.       No  "facility-administered medications prior to visit.       No opioid medication identified on active medication list. I have reviewed chart for other potential  high risk medication/s and harmful drug interactions in the elderly.        Aspirin is on active medication list. Aspirin use is indicated based on review of current medical condition/s. Pros and cons of this therapy have been discussed today. Benefits of this medication outweigh potential harm.  Patient has been encouraged to continue taking this medication.  .      Patient Active Problem List   Diagnosis    Abnormal electrocardiogram    Benign prostatic hypertrophy without urinary obstruction    Dysphagia    Erectile dysfunction of nonorganic origin    Gastroesophageal reflux disease    Hepatitis C virus infection    Essential hypertension    Low back pain    Retinal detachment    Vertigo    Labyrinthitis    Hepatitis C    ED (erectile dysfunction) of non-organic origin    Syncope and collapse    Palpitations    Mixed hyperlipidemia    Skin lesion    Prostate cancer screening    Prediabetes     Advance Care Planning   Advance Care Planning     Advance Directive is not on file.  ACP discussion was held with the patient during this visit. Patient has an advance directive (not in EMR), copy requested.     Objective    Vitals:    12/15/23 1109   BP: 124/78   Pulse: 74   Temp: 99.5 °F (37.5 °C)   SpO2: 98%   Weight: 83.9 kg (185 lb)   Height: 170.2 cm (67\")     Estimated body mass index is 28.98 kg/m² as calculated from the following:    Height as of this encounter: 170.2 cm (67\").    Weight as of this encounter: 83.9 kg (185 lb).           Does the patient have evidence of cognitive impairment? No          HEALTH RISK ASSESSMENT    Smoking Status:  Social History     Tobacco Use   Smoking Status Former    Packs/day: 0.50    Years: 28.00    Additional pack years: 0.00    Total pack years: 14.00    Types: Cigarettes    Quit date: 5/1/1996    Years since quitting: " 27.6    Passive exposure: Past   Smokeless Tobacco Never     Alcohol Consumption:  Social History     Substance and Sexual Activity   Alcohol Use Not Currently     Fall Risk Screen:    SAIGEADI Fall Risk Assessment was completed, and patient is at LOW risk for falls.Assessment completed on:12/15/2023    Depression Screenin/15/2023    11:02 AM   PHQ-2/PHQ-9 Depression Screening   Little Interest or Pleasure in Doing Things 0-->not at all   Feeling Down, Depressed or Hopeless 0-->not at all   PHQ-9: Brief Depression Severity Measure Score 0       Health Habits and Functional and Cognitive Screenin/15/2023    11:05 AM   Functional & Cognitive Status   Do you have difficulty preparing food and eating? No   Do you have difficulty bathing yourself, getting dressed or grooming yourself? No   Do you have difficulty using the toilet? No   Do you have difficulty moving around from place to place? No   Do you have trouble with steps or getting out of a bed or a chair? No   Current Diet Well Balanced Diet   Dental Exam Up to date   Eye Exam Up to date   Exercise (times per week) 3 times per week   Current Exercises Include Walking;Other   Do you need help using the phone?  No   Are you deaf or do you have serious difficulty hearing?  No   Do you need help to go to places out of walking distance? No   Do you need help shopping? No   Do you need help preparing meals?  No   Do you need help with housework?  No   Do you need help with laundry? No   Do you need help taking your medications? No   Do you need help managing money? No   Do you ever drive or ride in a car without wearing a seat belt? No   Have you felt unusual stress, anger or loneliness in the last month? No   Who do you live with? Spouse   If you need help, do you have trouble finding someone available to you? No   Have you been bothered in the last four weeks by sexual problems? No   Do you have difficulty concentrating, remembering or making  decisions? No       Age-appropriate Screening Schedule:  Refer to the list below for future screening recommendations based on patient's age, sex and/or medical conditions. Orders for these recommended tests are listed in the plan section. The patient has been provided with a written plan.    Health Maintenance   Topic Date Due    Hepatitis B (1 of 3 - Risk 3-dose series) Never done    LIPID PANEL  07/01/2024    ANNUAL WELLNESS VISIT  12/15/2024    BMI FOLLOWUP  12/15/2024    TDAP/TD VACCINES (2 - Td or Tdap) 02/21/2028    COLORECTAL CANCER SCREENING  01/04/2029    HEPATITIS C SCREENING  Completed    COVID-19 Vaccine  Completed    INFLUENZA VACCINE  Completed    Pneumococcal Vaccine 65+  Completed    AAA SCREEN (ONE-TIME)  Completed    ZOSTER VACCINE  Completed                  CMS Preventative Services Quick Reference  Risk Factors Identified During Encounter  None Identified  The above risks/problems have been discussed with the patient.  Pertinent information has been shared with the patient in the After Visit Summary.  An After Visit Summary and PPPS were made available to the patient.    Follow Up:   Next Medicare Wellness visit to be scheduled in 1 year.       Additional E&M Note during same encounter follows:  Patient has multiple medical problems which are significant and separately identifiable that require additional work above and beyond the Medicare Wellness Visit.      Chief Complaint  Medicare Wellness-subsequent (Pain in left shoulder.)    Subjective        ED-patient has erectile dysfunction which is fairly chronic.  Is been on Viagra before and it seemed to stop working.  He like to try different medication.    Left shoulder pain-chronic left shoulder pain.  Hurts to reach behind him and to hold things.  The pain is in the anterior left shoulder.  Radiates into his arm some.  No injuries recently.    Htn-chronic and controlled.  Doing well on current regimen medication.  Taking the beet supplement  "as well.    Hld-treated with atorvastatin, patient currently taking.  Needs repeat labs.  Has been to cardiology within the past year.    Poor eye sight and possible blindness - patient is reporting changing vision after leaving the service. Saw eye doctor at  in 70s and was told her was legally blind.  [facility]  Patient needs documentation of his previous eye doctor visits in the 1970s.      Mp Wen is also being seen today for shoulder pain and erectile dysfunction.    Review of Systems   Musculoskeletal:  Positive for arthralgias.       Objective   Vital Signs:  /78   Pulse 74   Temp 99.5 °F (37.5 °C)   Ht 170.2 cm (67\")   Wt 83.9 kg (185 lb)   SpO2 98%   BMI 28.98 kg/m²     Physical Exam  Vitals reviewed.   HENT:      Head: Normocephalic.   Eyes:      Pupils: Pupils are equal, round, and reactive to light.   Cardiovascular:      Rate and Rhythm: Normal rate.   Pulmonary:      Effort: Pulmonary effort is normal.   Abdominal:      General: Abdomen is flat.   Musculoskeletal:         General: No swelling.      Comments: Positive empty can and speeds test on the left shoulder.   Skin:     General: Skin is warm.   Neurological:      General: No focal deficit present.      Mental Status: He is alert.            Left subacromial shoulder injection    Date/Time: 12/15/2023 11:56 AM    Performed by: Howie Raymundo DO  Authorized by: Howie Raymundo DO  Consent: Verbal consent obtained. Written consent not obtained.  Risks and benefits: risks, benefits and alternatives were discussed  Patient understanding: patient states understanding of the procedure being performed  Patient identity confirmed: verbally with patient  Time out: Immediately prior to procedure a \"time out\" was called to verify the correct patient, procedure, equipment, support staff and site/side marked as required.  Patient tolerance: patient tolerated the procedure well with no immediate " complications  Comments: 1 inch 25-gauge needle used for injection.  Patient tolerated well with no acute complications.  Medications administered: 80 mg triamcinolone acetonide 40 MG/ML              Assessment and Plan   Diagnoses and all orders for this visit:    1. Medicare annual wellness visit, subsequent (Primary)    2. Prostate cancer screening  -     PSA Screen; Future    3. Essential hypertension  -     CBC & Differential; Future  -     Comprehensive Metabolic Panel; Future  -     TSH Rfx On Abnormal To Free T4; Future    4. Hyperlipidemia, unspecified hyperlipidemia type  -     Lipid Panel; Future    5. Overweight    6. Erectile dysfunction, unspecified erectile dysfunction type  -     tadalafil (Cialis) 10 MG tablet; Take 1 tablet by mouth Daily As Needed for Erectile Dysfunction.  Dispense: 30 tablet; Refill: 2    7. Chronic left shoulder pain  -     Left subacromial shoulder injection    Start Cialis for erectile dysfunction. Recommend trying ashwagandha for a few months.     Hypertension well-controlled, continue current therapy.  Blood work ordered for monitoring    Hyperlipidemia-recheck today.  Continue current treatment    Discussed weight and dieting and exercise.    PSA ordered today.    Left shoulder pain-patient has biceps tendinitis and rotator cuff tendinitis.  Recommend at home physical therapy 2-3 times per week.  Reviewed exercises with patient today.  Recommend PT consult if pain does not improve.  Injection provided as above         Follow Up   Return in about 1 year (around 12/15/2024), or if symptoms worsen or fail to improve, for Medicare Wellness, Labs today.  Patient was given instructions and counseling regarding his condition or for health maintenance advice. Please see specific information pulled into the AVS if appropriate.

## 2023-12-16 LAB
ALBUMIN SERPL-MCNC: 4.8 G/DL (ref 3.9–4.9)
ALBUMIN/GLOB SERPL: 1.6 {RATIO} (ref 1.2–2.2)
ALP SERPL-CCNC: 76 IU/L (ref 44–121)
ALT SERPL-CCNC: 30 IU/L (ref 0–44)
AST SERPL-CCNC: 28 IU/L (ref 0–40)
BASOPHILS # BLD AUTO: 0.1 X10E3/UL (ref 0–0.2)
BASOPHILS NFR BLD AUTO: 1 %
BILIRUB SERPL-MCNC: 0.8 MG/DL (ref 0–1.2)
BUN SERPL-MCNC: 10 MG/DL (ref 8–27)
BUN/CREAT SERPL: 10 (ref 10–24)
CALCIUM SERPL-MCNC: 9.7 MG/DL (ref 8.6–10.2)
CHLORIDE SERPL-SCNC: 97 MMOL/L (ref 96–106)
CHOLEST SERPL-MCNC: 150 MG/DL (ref 100–199)
CO2 SERPL-SCNC: 22 MMOL/L (ref 20–29)
CREAT SERPL-MCNC: 1.05 MG/DL (ref 0.76–1.27)
EGFRCR SERPLBLD CKD-EPI 2021: 77 ML/MIN/1.73
EOSINOPHIL # BLD AUTO: 0.1 X10E3/UL (ref 0–0.4)
EOSINOPHIL NFR BLD AUTO: 2 %
ERYTHROCYTE [DISTWIDTH] IN BLOOD BY AUTOMATED COUNT: 11.8 % (ref 11.6–15.4)
GLOBULIN SER CALC-MCNC: 3 G/DL (ref 1.5–4.5)
GLUCOSE SERPL-MCNC: NORMAL MG/DL
HCT VFR BLD AUTO: 38.8 % (ref 37.5–51)
HDLC SERPL-MCNC: 45 MG/DL
HGB BLD-MCNC: 13.5 G/DL (ref 13–17.7)
IMM GRANULOCYTES # BLD AUTO: 0 X10E3/UL (ref 0–0.1)
IMM GRANULOCYTES NFR BLD AUTO: 0 %
LDLC SERPL CALC-MCNC: 83 MG/DL (ref 0–99)
LYMPHOCYTES # BLD AUTO: 2.1 X10E3/UL (ref 0.7–3.1)
LYMPHOCYTES NFR BLD AUTO: 44 %
MCH RBC QN AUTO: 30.5 PG (ref 26.6–33)
MCHC RBC AUTO-ENTMCNC: 34.8 G/DL (ref 31.5–35.7)
MCV RBC AUTO: 88 FL (ref 79–97)
MONOCYTES # BLD AUTO: 0.6 X10E3/UL (ref 0.1–0.9)
MONOCYTES NFR BLD AUTO: 13 %
NEUTROPHILS # BLD AUTO: 2 X10E3/UL (ref 1.4–7)
NEUTROPHILS NFR BLD AUTO: 40 %
PLATELET # BLD AUTO: 250 X10E3/UL (ref 150–450)
POTASSIUM SERPL-SCNC: NORMAL MMOL/L
PROT SERPL-MCNC: 7.8 G/DL (ref 6–8.5)
PSA SERPL-MCNC: 0.4 NG/ML (ref 0–4)
RBC # BLD AUTO: 4.43 X10E6/UL (ref 4.14–5.8)
SODIUM SERPL-SCNC: 136 MMOL/L (ref 134–144)
TRIGL SERPL-MCNC: 122 MG/DL (ref 0–149)
TSH SERPL DL<=0.005 MIU/L-ACNC: 1.76 UIU/ML (ref 0.45–4.5)
VLDLC SERPL CALC-MCNC: 22 MG/DL (ref 5–40)
WBC # BLD AUTO: 4.9 X10E3/UL (ref 3.4–10.8)

## 2024-04-16 ENCOUNTER — TELEPHONE (OUTPATIENT)
Dept: FAMILY MEDICINE CLINIC | Facility: CLINIC | Age: 69
End: 2024-04-16

## 2024-04-16 DIAGNOSIS — Z12.11 ENCOUNTER FOR COLORECTAL CANCER SCREENING: Primary | ICD-10-CM

## 2024-04-16 DIAGNOSIS — Z12.12 ENCOUNTER FOR COLORECTAL CANCER SCREENING: Primary | ICD-10-CM

## 2024-04-16 NOTE — TELEPHONE ENCOUNTER
Called patient and informed him that order has been placed and they are working on it. Patient advised again to reach out to [facility] as advised by  to see if they are even able to see patient by end of the month. Patient would also like to see if the hospital can get him in by the end of the month. Will forward to referral pool.

## 2024-04-16 NOTE — TELEPHONE ENCOUNTER
Caller: Mp Wen    Relationship: Self    Best call back number: [phone]     What orders are you requesting (i.e. lab or imaging): COLONOSCOPY    In what timeframe would the patient need to come in: BY END OF MONTH    Additional notes: MEDICARE CALLED HIM AND TOLD HIM THEY WILL COVER HIS COLONOSCOPY. IT NEEDS TO BE DONE BY THE END OF THIS MONTH

## 2024-05-10 DIAGNOSIS — N52.9 ERECTILE DYSFUNCTION, UNSPECIFIED ERECTILE DYSFUNCTION TYPE: ICD-10-CM

## 2024-05-10 RX ORDER — TADALAFIL 10 MG/1
10 TABLET ORAL DAILY PRN
Qty: 30 TABLET | Refills: 2 | Status: SHIPPED | OUTPATIENT
Start: 2024-05-10

## 2024-07-02 NOTE — PROGRESS NOTES
Follow-up Visit      Date: 2024  Patient Name: Mp Wen  : 1955   MRN: [id number]     Chief Complaint:    Chief Complaint   Patient presents with    Bifascicular block       History of Present Illness: Mp Wen is a 69 y.o. male who is here today for follow-up on his multiple medical problems.  Patient has been doing fine.  Patient has been doing good exercise.  Patient denies any dizziness any palpitations any lower extremity edema.  His blood pressure has been under good control.  He does not have any process for nocturnal dyspnea.  He does not have any snoring or claudication problem.      Problem List     CARDIAC  Coronary Artery Disease:   Normal stress test   Stress test 2020: RBBB at rest, and normal myocardial perfusion    Myocardium:   Echo 2023: EF 50-55%    Valvular:   No significant valvular abnormalities    Electrical:   Bifascicular block    Percardium:   Normal    CARDIAC RISK FACTORS:  Hypertension  Dyslipidemia  2023   HDL 45 LDL 83   Tobacco Use: Former Smoker    NON-CARDIAC:  BPH  GERD  Hepatitis C    SURGERIES:  Hernia repair  Left cataract extraction      Subjective      Review of Systems:   Review of Systems   All other systems reviewed and are negative.      Medications:     Current Outpatient Medications:     aspirin 81 MG tablet, Take 1 tablet by mouth Daily., Disp: , Rfl:     atorvastatin (LIPITOR) 40 MG tablet, Take 1 tablet by mouth Every Night., Disp: 90 tablet, Rfl: 3    cholecalciferol (VITAMIN D3) 25 MCG (1000 UT) tablet, Take 1 tablet by mouth Daily., Disp: , Rfl:     lisinopril (PRINIVIL,ZESTRIL) 5 MG tablet, Take 1 tablet by mouth Daily., Disp: 90 tablet, Rfl: 3    tadalafil (Cialis) 10 MG tablet, Take 1 tablet by mouth Daily As Needed for Erectile Dysfunction., Disp: 30 tablet, Rfl: 2    Allergies:   No Known Allergies    Objective     Physical Exam:  Vitals:    24 1038   BP: 96/60   BP Location: Right arm   Patient  "Position: Sitting   Pulse: 94   SpO2: 97%   Weight: 82.8 kg (182 lb 9.6 oz)   Height: 172.7 cm (68\")     Body mass index is 27.76 kg/m².    Constitutional:       General: Not in acute distress.     Appearance: Healthy appearance. Not in distress.     Neck:     JVP:Not elevated     Carotid artery: Normal    Pulmonary:      Effort: Pulmonary effort is normal.      Breath sounds: Normal breath sounds. No wheezing. No rhonchi. No rales.     Cardiovascular:      Normal rate. Regular rhythm. Normal S1. Normal S2.      Murmurs: There is no significant murmur.      No gallop. No click. No rub.     Abdominal:      General: Bowel sounds are normal.      Palpations: Abdomen is soft.      Tenderness: There is no abdominal tenderness.    Extremities:     Pulses:Normal radial and pedal pulses     Edema:no edema    Smoking Cessation:   Tobacco Product History : Patient quit smoking in 1998 after 28 pack years of smoking     Lab Review:   Lab Results   Component Value Date    GLUCOSE CANCELED 12/15/2023    BUN 10 12/15/2023    CREATININE 1.05 12/15/2023    EGFRIFAFRI 73 05/24/2021    BCR 10 12/15/2023    K CANCELED 12/15/2023    CO2 22 12/15/2023    CALCIUM 9.7 12/15/2023    PROTENTOTREF 7.8 12/15/2023    ALBUMIN 4.8 12/15/2023    LABIL2 1.6 12/15/2023    AST 28 12/15/2023    ALT 30 12/15/2023     Lab Results   Component Value Date    WBC 4.9 12/15/2023    HGB 13.5 12/15/2023    HCT 38.8 12/15/2023    MCV 88 12/15/2023     12/15/2023     Lab Results   Component Value Date    TSH 1.760 12/15/2023              Assessment / Plan      Assessment:   Diagnosis Plan   1. Essential hypertension        2. Mixed hyperlipidemia             Plan:  Patient blood pressure has been under good control and we will continue him on the lisinopril 5 mg daily.  His cholesterol has been under good control also and he will continue with his Lipitor 40 mg daily.      Follow Up:       Return in about 18 months (around 1/3/2026).    Omar Smalls, " MD

## 2024-07-03 ENCOUNTER — OFFICE VISIT (OUTPATIENT)
Dept: CARDIOLOGY | Facility: CLINIC | Age: 69
End: 2024-07-03
Payer: MEDICARE

## 2024-07-03 VITALS
SYSTOLIC BLOOD PRESSURE: 96 MMHG | WEIGHT: 182.6 LBS | OXYGEN SATURATION: 97 % | HEART RATE: 94 BPM | DIASTOLIC BLOOD PRESSURE: 60 MMHG | BODY MASS INDEX: 27.68 KG/M2 | HEIGHT: 68 IN

## 2024-07-03 DIAGNOSIS — I10 ESSENTIAL HYPERTENSION: Primary | ICD-10-CM

## 2024-07-03 DIAGNOSIS — E78.2 MIXED HYPERLIPIDEMIA: ICD-10-CM

## 2024-07-03 PROCEDURE — 99213 OFFICE O/P EST LOW 20 MIN: CPT | Performed by: INTERNAL MEDICINE

## 2024-07-03 PROCEDURE — 3078F DIAST BP <80 MM HG: CPT | Performed by: INTERNAL MEDICINE

## 2024-07-03 PROCEDURE — 3074F SYST BP LT 130 MM HG: CPT | Performed by: INTERNAL MEDICINE

## 2024-07-15 DIAGNOSIS — E78.5 HYPERLIPIDEMIA, UNSPECIFIED HYPERLIPIDEMIA TYPE: ICD-10-CM

## 2024-07-15 RX ORDER — ATORVASTATIN CALCIUM 40 MG/1
40 TABLET, FILM COATED ORAL NIGHTLY
Qty: 90 TABLET | Refills: 3 | Status: SHIPPED | OUTPATIENT
Start: 2024-07-15

## 2024-07-15 NOTE — TELEPHONE ENCOUNTER
Caller: Mp Wen    Relationship: Self    Best call back number: [phone]     Requested Prescriptions:   Requested Prescriptions     Pending Prescriptions Disp Refills    atorvastatin (LIPITOR) 40 MG tablet 90 tablet 3     Sig: Take 1 tablet by mouth Every Night.        Pharmacy where request should be sent: [facility]/PHARMACY #[id number] [place], [place] - [address] [phone] [facility] [phone] FX     Last office visit with prescribing clinician: 12/15/2023   Last telemedicine visit with prescribing clinician: Visit date not found   Next office visit with prescribing clinician: 12/19/2024     Additional details provided by patient: PT IS OUT OF MEDICATION.    Does the patient have less than a 3 day supply:  [x] Yes  [] No    Would you like a call back once the refill request has been completed: [] Yes [x] No    If the office needs to give you a call back, can they leave a voicemail: [] Yes [x] No    Dana Dorado   07/15/24 09:28 EDT

## 2024-08-13 PROBLEM — R50.9 FEVER: Status: ACTIVE | Noted: 2024-08-13

## 2024-12-16 DIAGNOSIS — I10 ESSENTIAL HYPERTENSION: ICD-10-CM

## 2024-12-17 RX ORDER — LISINOPRIL 5 MG/1
5 TABLET ORAL DAILY
Qty: 90 TABLET | Refills: 3 | OUTPATIENT
Start: 2024-12-17

## 2024-12-19 ENCOUNTER — CLINICAL SUPPORT (OUTPATIENT)
Dept: FAMILY MEDICINE CLINIC | Facility: CLINIC | Age: 69
End: 2024-12-19
Payer: MEDICARE

## 2024-12-19 ENCOUNTER — OFFICE VISIT (OUTPATIENT)
Dept: FAMILY MEDICINE CLINIC | Facility: CLINIC | Age: 69
End: 2024-12-19
Payer: MEDICARE

## 2024-12-19 ENCOUNTER — LAB (OUTPATIENT)
Dept: LAB | Facility: HOSPITAL | Age: 69
End: 2024-12-19
Payer: MEDICARE

## 2024-12-19 VITALS
BODY MASS INDEX: 27.28 KG/M2 | HEART RATE: 81 BPM | TEMPERATURE: 98.6 F | DIASTOLIC BLOOD PRESSURE: 64 MMHG | SYSTOLIC BLOOD PRESSURE: 116 MMHG | OXYGEN SATURATION: 96 % | HEIGHT: 69 IN | WEIGHT: 184.2 LBS

## 2024-12-19 DIAGNOSIS — Z23 IMMUNIZATION DUE: ICD-10-CM

## 2024-12-19 DIAGNOSIS — I10 ESSENTIAL HYPERTENSION: ICD-10-CM

## 2024-12-19 DIAGNOSIS — N52.9 ERECTILE DYSFUNCTION, UNSPECIFIED ERECTILE DYSFUNCTION TYPE: ICD-10-CM

## 2024-12-19 DIAGNOSIS — Z12.5 PROSTATE CANCER SCREENING: ICD-10-CM

## 2024-12-19 DIAGNOSIS — R25.1 TREMOR: ICD-10-CM

## 2024-12-19 DIAGNOSIS — Z00.00 MEDICARE ANNUAL WELLNESS VISIT, SUBSEQUENT: Primary | ICD-10-CM

## 2024-12-19 DIAGNOSIS — E78.5 HYPERLIPIDEMIA, UNSPECIFIED HYPERLIPIDEMIA TYPE: ICD-10-CM

## 2024-12-19 LAB
ALBUMIN SERPL-MCNC: 4.5 G/DL (ref 3.5–5.2)
ALBUMIN UR-MCNC: <1.2 MG/DL
ALBUMIN/GLOB SERPL: 1.3 G/DL
ALP SERPL-CCNC: 73 U/L (ref 39–117)
ALT SERPL W P-5'-P-CCNC: 13 U/L (ref 1–41)
ANION GAP SERPL CALCULATED.3IONS-SCNC: 14.7 MMOL/L (ref 5–15)
ANISOCYTOSIS BLD QL: ABNORMAL
AST SERPL-CCNC: 22 U/L (ref 1–40)
BASOPHILS # BLD MANUAL: 0.11 10*3/MM3 (ref 0–0.2)
BASOPHILS NFR BLD MANUAL: 2 % (ref 0–1.5)
BILIRUB SERPL-MCNC: 0.7 MG/DL (ref 0–1.2)
BUN SERPL-MCNC: 13 MG/DL (ref 8–23)
BUN/CREAT SERPL: 10.6 (ref 7–25)
CALCIUM SPEC-SCNC: 9.7 MG/DL (ref 8.6–10.5)
CHLORIDE SERPL-SCNC: 92 MMOL/L (ref 98–107)
CHOLEST SERPL-MCNC: 130 MG/DL (ref 0–200)
CO2 SERPL-SCNC: 26.3 MMOL/L (ref 22–29)
CREAT SERPL-MCNC: 1.23 MG/DL (ref 0.76–1.27)
CREAT UR-MCNC: 182.2 MG/DL
DEPRECATED RDW RBC AUTO: 36.6 FL (ref 37–54)
EGFRCR SERPLBLD CKD-EPI 2021: 63.6 ML/MIN/1.73
EOSINOPHIL # BLD MANUAL: 0 10*3/MM3 (ref 0–0.4)
EOSINOPHIL NFR BLD MANUAL: 0 % (ref 0.3–6.2)
ERYTHROCYTE [DISTWIDTH] IN BLOOD BY AUTOMATED COUNT: 14.8 % (ref 12.3–15.4)
GLOBULIN UR ELPH-MCNC: 3.4 GM/DL
GLUCOSE SERPL-MCNC: 98 MG/DL (ref 65–99)
HCT VFR BLD AUTO: 26.7 % (ref 37.5–51)
HDLC SERPL-MCNC: 41 MG/DL (ref 40–60)
HGB BLD-MCNC: 7.9 G/DL (ref 13–17.7)
HYPOCHROMIA BLD QL: ABNORMAL
LDLC SERPL CALC-MCNC: 70 MG/DL (ref 0–100)
LDLC/HDLC SERPL: 1.67 {RATIO}
LYMPHOCYTES # BLD MANUAL: 1.73 10*3/MM3 (ref 0.7–3.1)
LYMPHOCYTES NFR BLD MANUAL: 9.1 % (ref 5–12)
MCH RBC QN AUTO: 20.8 PG (ref 26.6–33)
MCHC RBC AUTO-ENTMCNC: 29.6 G/DL (ref 31.5–35.7)
MCV RBC AUTO: 70.4 FL (ref 79–97)
MICROALBUMIN/CREAT UR: NORMAL MG/G{CREAT}
MICROCYTES BLD QL: ABNORMAL
MONOCYTES # BLD: 0.5 10*3/MM3 (ref 0.1–0.9)
NEUTROPHILS # BLD AUTO: 3.18 10*3/MM3 (ref 1.7–7)
NEUTROPHILS NFR BLD MANUAL: 57.6 % (ref 42.7–76)
NRBC BLD AUTO-RTO: 0 /100 WBC (ref 0–0.2)
PLAT MORPH BLD: NORMAL
PLATELET # BLD AUTO: 412 10*3/MM3 (ref 140–450)
PMV BLD AUTO: 9.7 FL (ref 6–12)
POIKILOCYTOSIS BLD QL SMEAR: ABNORMAL
POTASSIUM SERPL-SCNC: 4.8 MMOL/L (ref 3.5–5.2)
PROT SERPL-MCNC: 7.9 G/DL (ref 6–8.5)
PSA SERPL-MCNC: 0.47 NG/ML (ref 0–4)
RBC # BLD AUTO: 3.79 10*6/MM3 (ref 4.14–5.8)
SODIUM SERPL-SCNC: 133 MMOL/L (ref 136–145)
TRIGL SERPL-MCNC: 102 MG/DL (ref 0–150)
TSH SERPL DL<=0.05 MIU/L-ACNC: 1.43 UIU/ML (ref 0.27–4.2)
VARIANT LYMPHS NFR BLD MANUAL: 31.3 % (ref 19.6–45.3)
VLDLC SERPL-MCNC: 19 MG/DL (ref 5–40)
WBC MORPH BLD: NORMAL
WBC NRBC COR # BLD AUTO: 5.52 10*3/MM3 (ref 3.4–10.8)

## 2024-12-19 PROCEDURE — 80061 LIPID PANEL: CPT

## 2024-12-19 PROCEDURE — 80053 COMPREHEN METABOLIC PANEL: CPT

## 2024-12-19 PROCEDURE — 82043 UR ALBUMIN QUANTITATIVE: CPT

## 2024-12-19 PROCEDURE — 85025 COMPLETE CBC W/AUTO DIFF WBC: CPT

## 2024-12-19 PROCEDURE — 82570 ASSAY OF URINE CREATININE: CPT

## 2024-12-19 PROCEDURE — 85007 BL SMEAR W/DIFF WBC COUNT: CPT

## 2024-12-19 PROCEDURE — 84443 ASSAY THYROID STIM HORMONE: CPT

## 2024-12-19 PROCEDURE — G0103 PSA SCREENING: HCPCS

## 2024-12-19 RX ORDER — LISINOPRIL 5 MG/1
5 TABLET ORAL DAILY
Qty: 90 TABLET | Refills: 3 | Status: CANCELLED | OUTPATIENT
Start: 2024-12-19

## 2024-12-19 RX ORDER — TADALAFIL 10 MG/1
10 TABLET ORAL DAILY PRN
Qty: 30 TABLET | Refills: 2 | Status: SHIPPED | OUTPATIENT
Start: 2024-12-19

## 2024-12-19 RX ORDER — LISINOPRIL 5 MG/1
5 TABLET ORAL DAILY
Qty: 90 TABLET | Refills: 3 | Status: SHIPPED | OUTPATIENT
Start: 2024-12-19

## 2024-12-19 RX ORDER — ATORVASTATIN CALCIUM 40 MG/1
40 TABLET, FILM COATED ORAL NIGHTLY
Qty: 90 TABLET | Refills: 3 | Status: SHIPPED | OUTPATIENT
Start: 2024-12-19

## 2024-12-19 NOTE — ASSESSMENT & PLAN NOTE
Hypertension is stable and controlled  Continue current treatment regimen.  Blood pressure will be reassessed in 1 year.    Orders:    lisinopril (PRINIVIL,ZESTRIL) 5 MG tablet; Take 1 tablet by mouth Daily.    CBC & Differential; Future    Comprehensive Metabolic Panel; Future    TSH Rfx On Abnormal To Free T4; Future    Microalbumin / Creatinine Urine Ratio - Urine, Clean Catch; Future

## 2024-12-19 NOTE — LETTER
[facility]  Vaccine Consent Form    Patient Name:  pM Wen  Patient :  1955     Vaccine(s) Ordered    Hepatitis B Vaccine Adult IM (ENGERIX/RECOMBIVAX)        Screening Checklist  The following questions should be completed prior to vaccination. If you answer “yes” to any question, it does not necessarily mean you should not be vaccinated. It just means we may need to clarify or ask more questions. If a question is unclear, please ask your healthcare provider to explain it.    Yes No   Any fever or moderate to severe illness today (mild illness and/or antibiotic treatment are not contraindications)?     Do you have a history of a serious reaction to any previous vaccinations, such as anaphylaxis, encephalopathy within 7 days, Guillain-[place] syndrome within 6 weeks, seizure?     Have you received any live vaccine(s) (e.g MMR, PK) or any other vaccines in the last month (to ensure duplicate doses aren't given)?     Do you have an anaphylactic allergy to latex (DTaP, DTaP-IPV, Hep A, Hep B, MenB, RV, Td, Tdap), baker’s yeast (Hep B, HPV), polysorbates (RSV, nirsevimab, PCV 20, Rotavirrus, Tdap, Shingrix), or gelatin (PK, MMR)?     Do you have an anaphylactic allergy to neomycin (Rabies, PK, MMR, IPV, Hep A), polymyxin B (IPV), or streptomycin (IPV)?      Any cancer, leukemia, AIDS, or other immune system disorder? (PK, MMR, RV)     Do you have a parent, brother, or sister with an immune system problem (if immune competence of vaccine recipient clinically verified, can proceed)? (MMR, PK)     Any recent steroid treatments for >2 weeks, chemotherapy, or radiation treatment? (PK, MMR)     Have you received antibody-containing blood transfusions or IVIG in the past 11 months (recommended interval is dependent on product)? (MMR, PK)     Have you taken antiviral drugs (acyclovir, famciclovir, valacyclovir for PK) in the last 24 or 48 hours, respectively?      Are you pregnant or planning to become  "pregnant within 1 month? (PK, MMR, HPV, IPV, MenB, Abrexvy; For Hep B- refer to Engerix-B; For RSV - Abrysvo is indicated for 32-36 weeks of pregnancy from September to January)     For infants, have you ever been told your child has had intussusception or a medical emergency involving obstruction of the intestine (Rotavirus)? If not for an infant, can skip this question.         *Ordering Physicians/APC should be consulted if \"yes\" is checked by the patient or guardian above.  I have received, read, and understand the Vaccine Information Statement (VIS) for each vaccine ordered.  I have considered my or my child's health status as well as the health status of my close contacts.  I have taken the opportunity to discuss my vaccine questions with my or my child's health care provider.   I have requested that the ordered vaccine(s) be given to me or my child.  I understand the benefits and risks of the vaccines.  I understand that I should remain in the clinic for 15 minutes after receiving the vaccine(s).  _________________________________________________________  Signature of Patient or Parent/Legal Guardian ____________________  Date   "

## 2024-12-19 NOTE — PROGRESS NOTES
Subjective   The ABCs of the Annual Wellness Visit  Medicare Wellness Visit      Mp Wen is a 69 y.o. patient who presents for a Medicare Wellness Visit.    The following portions of the patient's history were reviewed and   updated as appropriate: allergies, current medications, past family history, past medical history, past social history, past surgical history, and problem list.    Compared to one year ago, the patient's physical   health is the same.  Compared to one year ago, the patient's mental   health is the same.    Recent Hospitalizations:  He was not admitted to the hospital during the last year.     Current Medical Providers:  Patient Care Team:  Howie Raymundo DO as PCP - General (Family Medicine)  Howie Raymundo DO as Consulting Physician (Family Medicine)  Omar Smalls MD as Cardiologist (Cardiology)    Outpatient Medications Prior to Visit   Medication Sig Dispense Refill    aspirin 81 MG tablet Take 1 tablet by mouth Daily.      cholecalciferol (VITAMIN D3) 25 MCG (1000 UT) tablet Take 1 tablet by mouth Daily.      atorvastatin (LIPITOR) 40 MG tablet Take 1 tablet by mouth Every Night. 90 tablet 3    lisinopril (PRINIVIL,ZESTRIL) 5 MG tablet Take 1 tablet by mouth Daily. 90 tablet 3    tadalafil (Cialis) 10 MG tablet Take 1 tablet by mouth Daily As Needed for Erectile Dysfunction. 30 tablet 2     No facility-administered medications prior to visit.     No opioid medication identified on active medication list. I have reviewed chart for other potential  high risk medication/s and harmful drug interactions in the elderly.      Aspirin is on active medication list. Aspirin use is indicated based on review of current medical condition/s. Pros and cons of this therapy have been discussed today. Benefits of this medication outweigh potential harm.  Patient has been encouraged to continue taking this medication.  .      Patient Active Problem List   Diagnosis    Abnormal electrocardiogram     "Benign prostatic hypertrophy without urinary obstruction    Dysphagia    Erectile dysfunction of nonorganic origin    Gastroesophageal reflux disease    Hepatitis C virus infection    Essential hypertension    Low back pain    Retinal detachment    Vertigo    Labyrinthitis    Hepatitis C    ED (erectile dysfunction) of non-organic origin    Syncope and collapse    Palpitations    Mixed hyperlipidemia    Skin lesion    Prostate cancer screening    Prediabetes    Fever     Advance Care Planning Advance Directive is not on file.  ACP discussion was held with the patient during this visit. Patient has an advance directive (not in EMR), copy requested.            Objective   Vitals:    24 1124   BP: 116/64   Pulse: 81   Temp: 98.6 °F (37 °C)   TempSrc: Infrared   SpO2: 96%   Weight: 83.6 kg (184 lb 3.2 oz)   Height: 175.3 cm (69\")   PainSc: 0-No pain       Estimated body mass index is 27.2 kg/m² as calculated from the following:    Height as of this encounter: 175.3 cm (69\").    Weight as of this encounter: 83.6 kg (184 lb 3.2 oz).    BMI is >= 25 and <30. (Overweight) The following options were offered after discussion;: weight loss educational material (shared in after visit summary) and exercise counseling/recommendations           Does the patient have evidence of cognitive impairment? No                                                                                              Health  Risk Assessment    Smoking Status:  Social History     Tobacco Use   Smoking Status Former    Current packs/day: 0.00    Average packs/day: 0.5 packs/day for 28.0 years (14.0 ttl pk-yrs)    Types: Cigarettes    Start date: 1968    Quit date: 1996    Years since quittin.6    Passive exposure: Past   Smokeless Tobacco Never     Alcohol Consumption:  Social History     Substance and Sexual Activity   Alcohol Use Not Currently       Fall Risk Screen  STEADI Fall Risk Assessment was completed, and patient is at LOW risk " for falls.Assessment completed on:2024    Depression Screening   Little interest or pleasure in doing things? Not at all   Feeling down, depressed, or hopeless? Not at all   PHQ-2 Total Score 0      Health Habits and Functional and Cognitive Screenin/19/2024    11:23 AM   Functional & Cognitive Status   Do you have difficulty preparing food and eating? No   Do you have difficulty bathing yourself, getting dressed or grooming yourself? No   Do you have difficulty using the toilet? No   Do you have difficulty moving around from place to place? No   Do you have trouble with steps or getting out of a bed or a chair? No   Current Diet Well Balanced Diet   Dental Exam Up to date   Eye Exam Up to date   Exercise (times per week) 0 times per week   Current Exercises Include No Regular Exercise;Walking   Do you need help using the phone?  No   Are you deaf or do you have serious difficulty hearing?  No   Do you need help to go to places out of walking distance? No   Do you need help shopping? No   Do you need help preparing meals?  No   Do you need help with housework?  No   Do you need help with laundry? No   Do you need help taking your medications? No   Do you need help managing money? No   Do you ever drive or ride in a car without wearing a seat belt? No   Have you felt unusual stress, anger or loneliness in the last month? No   Who do you live with? Spouse   If you need help, do you have trouble finding someone available to you? No   Have you been bothered in the last four weeks by sexual problems? No   Do you have difficulty concentrating, remembering or making decisions? No           Age-appropriate Screening Schedule:  Refer to the list below for future screening recommendations based on patient's age, sex and/or medical conditions. Orders for these recommended tests are listed in the plan section. The patient has been provided with a written plan.    Health Maintenance List  Health Maintenance    Topic Date Due    Hepatitis B (1 of 3 - Risk 3-dose series) Never done    COVID-19 Vaccine (9 - 2024-25 season) 09/01/2024    LIPID PANEL  12/15/2024    ANNUAL WELLNESS VISIT  12/19/2025    BMI FOLLOWUP  12/19/2025    COLORECTAL CANCER SCREENING  04/25/2029    TDAP/TD VACCINES (3 - Td or Tdap) 03/11/2034    HEPATITIS C SCREENING  Completed    INFLUENZA VACCINE  Completed    Pneumococcal Vaccine 65+  Completed    AAA SCREEN (ONE-TIME)  Completed    ZOSTER VACCINE  Completed    HEMOGLOBIN A1C  Discontinued    URINE MICROALBUMIN  Discontinued                                                                                                                                                CMS Preventative Services Quick Reference  Risk Factors Identified During Encounter  Immunizations Discussed/Encouraged: Hepatitis B Vaccine/Series  Inactivity/Sedentary: Patient was advised to exercise at least 150 minutes a week per CDC recommendations.    The above risks/problems have been discussed with the patient.  Pertinent information has been shared with the patient in the After Visit Summary.  An After Visit Summary and PPPS were made available to the patient.    Follow Up:   Next Medicare Wellness visit to be scheduled in 1 year.     Assessment & Plan  Medicare annual wellness visit, subsequent         Tremor  Chronic tremor discussed with patient.  He would like to discuss etiology and treatment options.  He would prefer to stay off of new medications at this time.  Referring to neurology to discuss etiology and treatment options.  Orders:    Ambulatory Referral to Neurology    Hyperlipidemia, unspecified hyperlipidemia type   Lipid abnormalities are stable    Plan:  Continue same medication/s without change.      Discussed medication dosage, use, side effects, and goals of treatment in detail.    Counseled patient on lifestyle modifications to help control hyperlipidemia.     Patient Treatment Goals:   LDL goal is under  100    Followup in 1 year.    Orders:    atorvastatin (LIPITOR) 40 MG tablet; Take 1 tablet by mouth Every Night.    Lipid Panel; Future    Essential hypertension  Hypertension is stable and controlled  Continue current treatment regimen.  Blood pressure will be reassessed in 1 year.    Orders:    lisinopril (PRINIVIL,ZESTRIL) 5 MG tablet; Take 1 tablet by mouth Daily.    CBC & Differential; Future    Comprehensive Metabolic Panel; Future    TSH Rfx On Abnormal To Free T4; Future    Microalbumin / Creatinine Urine Ratio - Urine, Clean Catch; Future    Erectile dysfunction, unspecified erectile dysfunction type    Orders:    tadalafil (Cialis) 10 MG tablet; Take 1 tablet by mouth Daily As Needed for Erectile Dysfunction.    Prostate cancer screening    Orders:    PSA Screen; Future    Immunization due    Orders:    Hepatitis B Vaccine Adult IM (ENGERIX/RECOMBIVAX)         Follow Up:   Return in about 1 year (around 12/19/2025) for Labs today, Medicare Wellness.

## 2024-12-20 ENCOUNTER — LAB (OUTPATIENT)
Dept: LAB | Facility: HOSPITAL | Age: 69
End: 2024-12-20
Payer: MEDICARE

## 2024-12-20 ENCOUNTER — APPOINTMENT (OUTPATIENT)
Dept: CT IMAGING | Facility: HOSPITAL | Age: 69
End: 2024-12-20
Payer: MEDICARE

## 2024-12-20 ENCOUNTER — HOSPITAL ENCOUNTER (EMERGENCY)
Facility: HOSPITAL | Age: 69
Discharge: HOME | End: 2024-12-21
Attending: EMERGENCY MEDICINE
Payer: MEDICARE

## 2024-12-20 DIAGNOSIS — D64.9 ANEMIA, UNSPECIFIED TYPE: Primary | ICD-10-CM

## 2024-12-20 DIAGNOSIS — Z02.89 REFERRED BY HEALTH CARE PROFESSIONAL: ICD-10-CM

## 2024-12-20 DIAGNOSIS — D64.9 ACUTE ON CHRONIC ANEMIA: Primary | ICD-10-CM

## 2024-12-20 DIAGNOSIS — D50.9 IRON DEFICIENCY ANEMIA, UNSPECIFIED IRON DEFICIENCY ANEMIA TYPE: ICD-10-CM

## 2024-12-20 DIAGNOSIS — D64.9 ANEMIA, UNSPECIFIED TYPE: ICD-10-CM

## 2024-12-20 LAB
ABO GROUP BLD: NORMAL
ALBUMIN SERPL-MCNC: 4.2 G/DL (ref 3.5–5.2)
ALBUMIN/GLOB SERPL: 1.4 G/DL
ALP SERPL-CCNC: 74 U/L (ref 39–117)
ALT SERPL W P-5'-P-CCNC: 12 U/L (ref 1–41)
ANION GAP SERPL CALCULATED.3IONS-SCNC: 8 MMOL/L (ref 5–15)
ANION GAP SERPL CALCULATED.3IONS-SCNC: 9 MMOL/L (ref 5–15)
AST SERPL-CCNC: 19 U/L (ref 1–40)
BASOPHILS # BLD AUTO: 0.07 10*3/MM3 (ref 0–0.2)
BASOPHILS # BLD AUTO: 0.11 10*3/MM3 (ref 0–0.2)
BASOPHILS NFR BLD AUTO: 1.2 % (ref 0–1.5)
BASOPHILS NFR BLD AUTO: 1.7 % (ref 0–1.5)
BILIRUB SERPL-MCNC: 0.4 MG/DL (ref 0–1.2)
BLD GP AB SCN SERPL QL: NEGATIVE
BUN SERPL-MCNC: 14 MG/DL (ref 8–23)
BUN SERPL-MCNC: 15 MG/DL (ref 8–23)
BUN/CREAT SERPL: 12.1 (ref 7–25)
BUN/CREAT SERPL: 12.2 (ref 7–25)
CALCIUM SPEC-SCNC: 9.1 MG/DL (ref 8.6–10.5)
CALCIUM SPEC-SCNC: 9.6 MG/DL (ref 8.6–10.5)
CHLORIDE SERPL-SCNC: 100 MMOL/L (ref 98–107)
CHLORIDE SERPL-SCNC: 98 MMOL/L (ref 98–107)
CO2 SERPL-SCNC: 27 MMOL/L (ref 22–29)
CO2 SERPL-SCNC: 28 MMOL/L (ref 22–29)
CREAT SERPL-MCNC: 1.16 MG/DL (ref 0.76–1.27)
CREAT SERPL-MCNC: 1.23 MG/DL (ref 0.76–1.27)
D-LACTATE SERPL-SCNC: 1.1 MMOL/L (ref 0.5–2)
DEPRECATED RDW RBC AUTO: 44.6 FL (ref 37–54)
DEPRECATED RDW RBC AUTO: 45.1 FL (ref 37–54)
DEVELOPER EXPIRATION DATE: NORMAL
DEVELOPER LOT NUMBER: NORMAL
EGFRCR SERPLBLD CKD-EPI 2021: 63.6 ML/MIN/1.73
EGFRCR SERPLBLD CKD-EPI 2021: 68.2 ML/MIN/1.73
EOSINOPHIL # BLD AUTO: 0.12 10*3/MM3 (ref 0–0.4)
EOSINOPHIL # BLD AUTO: 0.15 10*3/MM3 (ref 0–0.4)
EOSINOPHIL NFR BLD AUTO: 2 % (ref 0.3–6.2)
EOSINOPHIL NFR BLD AUTO: 2.4 % (ref 0.3–6.2)
ERYTHROCYTE [DISTWIDTH] IN BLOOD BY AUTOMATED COUNT: 16.6 % (ref 12.3–15.4)
ERYTHROCYTE [DISTWIDTH] IN BLOOD BY AUTOMATED COUNT: 16.9 % (ref 12.3–15.4)
EXPIRATION DATE: NORMAL
FECAL OCCULT BLOOD SCREEN, POC: NEGATIVE
FERRITIN SERPL-MCNC: 30.02 NG/ML (ref 30–400)
GLOBULIN UR ELPH-MCNC: 3.1 GM/DL
GLUCOSE SERPL-MCNC: 101 MG/DL (ref 65–99)
GLUCOSE SERPL-MCNC: 111 MG/DL (ref 65–99)
HCT VFR BLD AUTO: 25.6 % (ref 37.5–51)
HCT VFR BLD AUTO: 26.9 % (ref 37.5–51)
HGB BLD-MCNC: 7.2 G/DL (ref 13–17.7)
HGB BLD-MCNC: 7.5 G/DL (ref 13–17.7)
HOLD SPECIMEN: NORMAL
HYPOCHROMIA BLD QL: NORMAL
IMM GRANULOCYTES # BLD AUTO: 0.01 10*3/MM3 (ref 0–0.05)
IMM GRANULOCYTES # BLD AUTO: 0.02 10*3/MM3 (ref 0–0.05)
IMM GRANULOCYTES NFR BLD AUTO: 0.2 % (ref 0–0.5)
IMM GRANULOCYTES NFR BLD AUTO: 0.3 % (ref 0–0.5)
IRON 24H UR-MRATE: 16 MCG/DL (ref 59–158)
IRON 24H UR-MRATE: 19 MCG/DL (ref 59–158)
IRON SATN MFR SERPL: 3 % (ref 20–50)
IRON SATN MFR SERPL: 4 % (ref 20–50)
LYMPHOCYTES # BLD AUTO: 2.13 10*3/MM3 (ref 0.7–3.1)
LYMPHOCYTES # BLD AUTO: 2.46 10*3/MM3 (ref 0.7–3.1)
LYMPHOCYTES NFR BLD AUTO: 35.7 % (ref 19.6–45.3)
LYMPHOCYTES NFR BLD AUTO: 38.8 % (ref 19.6–45.3)
Lab: NORMAL
MCH RBC QN AUTO: 20.5 PG (ref 26.6–33)
MCH RBC QN AUTO: 20.9 PG (ref 26.6–33)
MCHC RBC AUTO-ENTMCNC: 27.9 G/DL (ref 31.5–35.7)
MCHC RBC AUTO-ENTMCNC: 28.1 G/DL (ref 31.5–35.7)
MCV RBC AUTO: 73.5 FL (ref 79–97)
MCV RBC AUTO: 74.2 FL (ref 79–97)
MICROCYTES BLD QL: NORMAL
MONOCYTES # BLD AUTO: 0.96 10*3/MM3 (ref 0.1–0.9)
MONOCYTES # BLD AUTO: 1.02 10*3/MM3 (ref 0.1–0.9)
MONOCYTES NFR BLD AUTO: 16.1 % (ref 5–12)
MONOCYTES NFR BLD AUTO: 16.1 % (ref 5–12)
NEGATIVE CONTROL: NEGATIVE
NEUTROPHILS NFR BLD AUTO: 2.58 10*3/MM3 (ref 1.7–7)
NEUTROPHILS NFR BLD AUTO: 2.67 10*3/MM3 (ref 1.7–7)
NEUTROPHILS NFR BLD AUTO: 40.7 % (ref 42.7–76)
NEUTROPHILS NFR BLD AUTO: 44.8 % (ref 42.7–76)
NRBC BLD AUTO-RTO: 0 /100 WBC (ref 0–0.2)
NRBC BLD AUTO-RTO: 0 /100 WBC (ref 0–0.2)
OVALOCYTES BLD QL SMEAR: NORMAL
PLAT MORPH BLD: NORMAL
PLATELET # BLD AUTO: 334 10*3/MM3 (ref 140–450)
PLATELET # BLD AUTO: 357 10*3/MM3 (ref 140–450)
PMV BLD AUTO: 8.4 FL (ref 6–12)
PMV BLD AUTO: 8.9 FL (ref 6–12)
POSITIVE CONTROL: POSITIVE
POTASSIUM SERPL-SCNC: 4.6 MMOL/L (ref 3.5–5.2)
POTASSIUM SERPL-SCNC: 4.7 MMOL/L (ref 3.5–5.2)
PROT SERPL-MCNC: 7.3 G/DL (ref 6–8.5)
RBC # BLD AUTO: 3.45 10*6/MM3 (ref 4.14–5.8)
RBC # BLD AUTO: 3.66 10*6/MM3 (ref 4.14–5.8)
RH BLD: POSITIVE
SODIUM SERPL-SCNC: 135 MMOL/L (ref 136–145)
SODIUM SERPL-SCNC: 135 MMOL/L (ref 136–145)
T&S EXPIRATION DATE: NORMAL
TIBC SERPL-MCNC: 489 MCG/DL (ref 298–536)
TIBC SERPL-MCNC: 535 MCG/DL (ref 298–536)
TRANSFERRIN SERPL-MCNC: 328 MG/DL (ref 200–360)
TRANSFERRIN SERPL-MCNC: 359 MG/DL (ref 200–360)
VIT B12 BLD-MCNC: 656 PG/ML (ref 211–946)
WBC MORPH BLD: NORMAL
WBC NRBC COR # BLD AUTO: 5.96 10*3/MM3 (ref 3.4–10.8)
WBC NRBC COR # BLD AUTO: 6.34 10*3/MM3 (ref 3.4–10.8)
WHOLE BLOOD HOLD COAG: NORMAL
WHOLE BLOOD HOLD SPECIMEN: NORMAL

## 2024-12-20 PROCEDURE — 83605 ASSAY OF LACTIC ACID: CPT | Performed by: EMERGENCY MEDICINE

## 2024-12-20 PROCEDURE — 86923 COMPATIBILITY TEST ELECTRIC: CPT

## 2024-12-20 PROCEDURE — 80053 COMPREHEN METABOLIC PANEL: CPT

## 2024-12-20 PROCEDURE — 82728 ASSAY OF FERRITIN: CPT

## 2024-12-20 PROCEDURE — 84466 ASSAY OF TRANSFERRIN: CPT

## 2024-12-20 PROCEDURE — 86900 BLOOD TYPING SEROLOGIC ABO: CPT | Performed by: EMERGENCY MEDICINE

## 2024-12-20 PROCEDURE — 86900 BLOOD TYPING SEROLOGIC ABO: CPT

## 2024-12-20 PROCEDURE — 82607 VITAMIN B-12: CPT

## 2024-12-20 PROCEDURE — 83540 ASSAY OF IRON: CPT

## 2024-12-20 PROCEDURE — 86901 BLOOD TYPING SEROLOGIC RH(D): CPT

## 2024-12-20 PROCEDURE — 85007 BL SMEAR W/DIFF WBC COUNT: CPT

## 2024-12-20 PROCEDURE — 85025 COMPLETE CBC W/AUTO DIFF WBC: CPT | Performed by: EMERGENCY MEDICINE

## 2024-12-20 PROCEDURE — 36415 COLL VENOUS BLD VENIPUNCTURE: CPT

## 2024-12-20 PROCEDURE — 99285 EMERGENCY DEPT VISIT HI MDM: CPT

## 2024-12-20 PROCEDURE — 25510000001 IOPAMIDOL 61 % SOLUTION: Performed by: EMERGENCY MEDICINE

## 2024-12-20 PROCEDURE — 85025 COMPLETE CBC W/AUTO DIFF WBC: CPT

## 2024-12-20 PROCEDURE — 74177 CT ABD & PELVIS W/CONTRAST: CPT

## 2024-12-20 PROCEDURE — 86850 RBC ANTIBODY SCREEN: CPT | Performed by: EMERGENCY MEDICINE

## 2024-12-20 PROCEDURE — 82270 OCCULT BLOOD FECES: CPT | Performed by: EMERGENCY MEDICINE

## 2024-12-20 PROCEDURE — 86901 BLOOD TYPING SEROLOGIC RH(D): CPT | Performed by: EMERGENCY MEDICINE

## 2024-12-20 RX ORDER — SODIUM CHLORIDE 0.9 % (FLUSH) 0.9 %
10 SYRINGE (ML) INJECTION AS NEEDED
Status: DISCONTINUED | OUTPATIENT
Start: 2024-12-20 | End: 2024-12-21 | Stop reason: HOSPADM

## 2024-12-20 RX ORDER — IOPAMIDOL 612 MG/ML
100 INJECTION, SOLUTION INTRAVASCULAR
Status: COMPLETED | OUTPATIENT
Start: 2024-12-20 | End: 2024-12-20

## 2024-12-20 RX ADMIN — IOPAMIDOL 90 ML: 612 INJECTION, SOLUTION INTRAVENOUS at 22:40

## 2024-12-21 VITALS
TEMPERATURE: 98 F | RESPIRATION RATE: 18 BRPM | BODY MASS INDEX: 28.14 KG/M2 | HEIGHT: 69 IN | DIASTOLIC BLOOD PRESSURE: 78 MMHG | OXYGEN SATURATION: 100 % | SYSTOLIC BLOOD PRESSURE: 131 MMHG | WEIGHT: 190 LBS | HEART RATE: 57 BPM

## 2024-12-21 LAB
ABO GROUP BLD: NORMAL
RH BLD: POSITIVE

## 2024-12-21 PROCEDURE — 86900 BLOOD TYPING SEROLOGIC ABO: CPT

## 2024-12-21 PROCEDURE — P9016 RBC LEUKOCYTES REDUCED: HCPCS

## 2024-12-21 PROCEDURE — 36430 TRANSFUSION BLD/BLD COMPNT: CPT

## 2024-12-21 RX ORDER — FERROUS GLUCONATE 324(38)MG
324 TABLET ORAL
Qty: 30 TABLET | Refills: 0 | Status: SHIPPED | OUTPATIENT
Start: 2024-12-21

## 2024-12-21 RX ORDER — FERROUS SULFATE 325(65) MG
325 TABLET ORAL ONCE
Status: COMPLETED | OUTPATIENT
Start: 2024-12-21 | End: 2024-12-21

## 2024-12-21 RX ADMIN — FERROUS SULFATE TAB 325 MG (65 MG ELEMENTAL FE) 325 MG: 325 (65 FE) TAB at 00:21

## 2024-12-21 NOTE — ED PROVIDER NOTES
"Subjective   History of Present Illness  Patient is a 69-year-old male referred by his primary care secondary to \"abnormal labs\".  Patient was not sure what labs were abnormal.  He reports that the labs were drawn, and repeated.  He was advised that he was told that the labs were \"worse\".  Past medical history significant for hepatitis C, hyperlipidemia, and hypertension.  Patient denies any symptoms at this point.  He denies any chest pain or shortness of breath.  He does report some occasional fullness in the lower part of his abdomen.  However, the patient reports that he had a negative colonoscopy 2 years ago.  He denies any blood in his stool or melena.    History provided by:  Patient and spouse      Review of Systems    Past Medical History:   Diagnosis Date    Abnormal EKG     Acute bacterial prostatitis     Arrhythmia     Benign prostatic hypertrophy     ED (erectile dysfunction) of non-organic origin     Esophageal reflux     Hepatitis C     Hyperlipidemia     Hypertension     Labyrinthitis     Retinal detachment     Retinal detachment     Vertigo        No Known Allergies    Past Surgical History:   Procedure Laterality Date    CATARACT EXTRACTION Left     COLONOSCOPY      HERNIA REPAIR         Family History   Problem Relation Age of Onset    Coronary artery disease Mother     Heart attack Mother              Prostate cancer Father     Hypertension Sister     Hypertension Sister     Hypertension Brother     Hyperlipidemia Brother     Hypertension Brother     No Known Problems Paternal Grandmother        Social History     Socioeconomic History    Marital status:    Tobacco Use    Smoking status: Former     Current packs/day: 0.00     Average packs/day: 0.5 packs/day for 28.0 years (14.0 ttl pk-yrs)     Types: Cigarettes     Start date: 1968     Quit date: 1996     Years since quittin.6     Passive exposure: Past    Smokeless tobacco: Never   Vaping Use    Vaping status: " Never Used   Substance and Sexual Activity    Alcohol use: Not Currently    Drug use: Never    Sexual activity: Yes     Partners: Female     Birth control/protection: None           Objective   Physical Exam  Vitals and nursing note reviewed.   Constitutional:       General: He is not in acute distress.     Appearance: Normal appearance. He is not toxic-appearing.   Cardiovascular:      Rate and Rhythm: Normal rate and regular rhythm.      Pulses: Normal pulses.      Comments: Monitor demonstrating a normal sinus rhythm.  No arrhythmia visualized.  Pulmonary:      Effort: No respiratory distress.   Neurological:      Mental Status: He is alert and oriented to person, place, and time.   Psychiatric:         Mood and Affect: Mood normal.         Behavior: Behavior normal.         Procedures           ED Course  ED Course as of 12/21/24 0126   Fri Dec 20, 2024   2210 Review of the prior labs for the last 2 days demonstrates a microcytic iron deficiency anemia.  No other emergent findings on those labs. [RS]   2247 Hemoglobin(!): 7.2  Microcytic anemia once again confirmed. [RS]   Sat Dec 21, 2024   0001 Fecal Occult Blood: Negative  Occult negative stool. [RS]   0002 CT Abdomen Pelvis With Contrast  Personally reviewed the CT scan of the abdomen and pelvis.  On my interpretation there is no mass visualized. [RS]   0022 Patient is resting comfortably.  Heme-negative stool.  Patient with findings consistent with iron deficiency anemia.  No clear evidence of blood loss.  At this point, we will provide a unit of blood as well as iron supplementation.  At this point, no indication for admission to the hospital.  We will plan to discharge him home to follow-up with hematology for further evaluation as needed.  During that time we will have him on iron supplementation.  The patient and family voiced understanding and are agreeable. [RS]   0109 I have reviewed results, considerations, and diagnosis with the patient and/or  their representative. Anticipatory guidance provided. Follow-up plan reviewed. Precautions for acute return for re-evaluations also reviewed. This including potential for worsening of the presenting condition and need for further evaluation, admission, and/or intervention as indicated. Opportunity to as questions provided. I advised them to return for any concerns and stressed the importance of timely follow-up and outpatient services. They verbalized understanding.   [RS]      ED Course User Index  [RS] Damian Joseph MD                                                       Medical Decision Making  Problems Addressed:  Acute on chronic anemia: complicated acute illness or injury  Iron deficiency anemia, unspecified iron deficiency anemia type: complicated acute illness or injury  Referred by health care professional: complicated acute illness or injury    Amount and/or Complexity of Data Reviewed  Independent Historian: spouse  External Data Reviewed: labs.  Labs: ordered. Decision-making details documented in ED Course.  Radiology: ordered. Decision-making details documented in ED Course.    Risk  OTC drugs.  Prescription drug management.  Decision regarding hospitalization.        Final diagnoses:   Acute on chronic anemia   Iron deficiency anemia, unspecified iron deficiency anemia type   Referred by health care professional       ED Disposition  ED Disposition       ED Disposition   Discharge    Condition   Stable    Comment   --               Howie Raymundo DO  [address]  [address]  [place] [id number]  [phone]    Schedule an appointment as soon as possible for a visit       [facility] EMERGENCY DEPARTMENT  [address] [id number] [phone]    As needed, If symptoms worsen or ANY concerns.    Porfirio Freire MD  [address] [facility] [facility]  [address]  [place] [id number]  [phone]    Schedule an appointment as soon as possible for a visit   As soon as  possible.         Medication List        New Prescriptions      ferrous gluconate 324 MG tablet  Commonly known as: FERGON  Take 1 tablet by mouth Daily With Breakfast.               Where to Get Your Medications        These medications were sent to [facility]/pharmacy #[id number] - [place], [place] - [address] [facility] - [phone]  - [phone]   [address] [facility] [address]      Phone: [phone]   ferrous gluconate 324 MG tablet            Damian Joseph MD  12/21/24 0126

## 2024-12-22 LAB
BH BB BLOOD EXPIRATION DATE: NORMAL
BH BB BLOOD TYPE BARCODE: 5100
BH BB DISPENSE STATUS: NORMAL
BH BB PRODUCT CODE: NORMAL
BH BB UNIT NUMBER: NORMAL
CROSSMATCH INTERPRETATION: NORMAL
UNIT  ABO: NORMAL
UNIT  RH: NORMAL

## 2024-12-23 ENCOUNTER — LAB (OUTPATIENT)
Dept: LAB | Facility: HOSPITAL | Age: 69
End: 2024-12-23
Payer: MEDICARE

## 2024-12-23 DIAGNOSIS — D64.9 ANEMIA, UNSPECIFIED TYPE: ICD-10-CM

## 2024-12-23 DIAGNOSIS — D64.9 ANEMIA, UNSPECIFIED TYPE: Primary | ICD-10-CM

## 2024-12-23 LAB
BASOPHILS # BLD AUTO: 0.08 10*3/MM3 (ref 0–0.2)
BASOPHILS NFR BLD AUTO: 1.2 % (ref 0–1.5)
DEPRECATED RDW RBC AUTO: 42.7 FL (ref 37–54)
EOSINOPHIL # BLD AUTO: 0.17 10*3/MM3 (ref 0–0.4)
EOSINOPHIL NFR BLD AUTO: 2.5 % (ref 0.3–6.2)
ERYTHROCYTE [DISTWIDTH] IN BLOOD BY AUTOMATED COUNT: 16.9 % (ref 12.3–15.4)
HCT VFR BLD AUTO: 29.4 % (ref 37.5–51)
HGB BLD-MCNC: 8.8 G/DL (ref 13–17.7)
IMM GRANULOCYTES # BLD AUTO: 0.02 10*3/MM3 (ref 0–0.05)
IMM GRANULOCYTES NFR BLD AUTO: 0.3 % (ref 0–0.5)
LYMPHOCYTES # BLD AUTO: 2.42 10*3/MM3 (ref 0.7–3.1)
LYMPHOCYTES NFR BLD AUTO: 34.9 % (ref 19.6–45.3)
MCH RBC QN AUTO: 22.1 PG (ref 26.6–33)
MCHC RBC AUTO-ENTMCNC: 29.9 G/DL (ref 31.5–35.7)
MCV RBC AUTO: 73.9 FL (ref 79–97)
MONOCYTES # BLD AUTO: 1 10*3/MM3 (ref 0.1–0.9)
MONOCYTES NFR BLD AUTO: 14.4 % (ref 5–12)
NEUTROPHILS NFR BLD AUTO: 3.24 10*3/MM3 (ref 1.7–7)
NEUTROPHILS NFR BLD AUTO: 46.7 % (ref 42.7–76)
PLATELET # BLD AUTO: 370 10*3/MM3 (ref 140–450)
PMV BLD AUTO: 9.6 FL (ref 6–12)
RBC # BLD AUTO: 3.98 10*6/MM3 (ref 4.14–5.8)
WBC NRBC COR # BLD AUTO: 6.93 10*3/MM3 (ref 3.4–10.8)

## 2024-12-23 PROCEDURE — 85025 COMPLETE CBC W/AUTO DIFF WBC: CPT

## 2024-12-26 ENCOUNTER — TELEPHONE (OUTPATIENT)
Dept: ONCOLOGY | Facility: CLINIC | Age: 69
End: 2024-12-26
Payer: MEDICARE

## 2024-12-26 DIAGNOSIS — D50.9 IRON DEFICIENCY ANEMIA, UNSPECIFIED IRON DEFICIENCY ANEMIA TYPE: Primary | ICD-10-CM

## 2024-12-26 NOTE — TELEPHONE ENCOUNTER
Hub staff attempted to follow warm transfer process and was unsuccessful     Caller: Mp Wen    Relationship to patient: Self    Best call back number: [phone]    Patient is needing: PT STATES HE WAS SPEAKING WITH NURSE AND GOT DISCONNECTED, REQUESTING C/B

## 2024-12-31 ENCOUNTER — TELEPHONE (OUTPATIENT)
Dept: FAMILY MEDICINE CLINIC | Facility: CLINIC | Age: 69
End: 2024-12-31
Payer: MEDICARE

## 2024-12-31 NOTE — TELEPHONE ENCOUNTER
Hub to Relay    Called patient and left a message for patient to return our call. Also sent message through his my chart account.    Please call the patient and let them know the following:    The patient's blood counts have improved.  Like to recheck them in a few weeks.  Please have the patient schedule a follow-up so we can review his blood work at that time and discuss additional testing.  Also he has an oncology visit in early February, and I would like to check his levels again before that visit.

## 2025-01-02 ENCOUNTER — TELEPHONE (OUTPATIENT)
Dept: FAMILY MEDICINE CLINIC | Facility: CLINIC | Age: 70
End: 2025-01-02
Payer: MEDICARE

## 2025-01-02 DIAGNOSIS — D64.9 ANEMIA, UNSPECIFIED TYPE: Primary | ICD-10-CM

## 2025-01-02 NOTE — TELEPHONE ENCOUNTER
I made a referral to gastroenterology today.  The patient needs upper endoscopy and also a colonoscopy to see if there is any small bleed.  If he has a gastroenterologist that he is already established with, then he can call their office to schedule visit as well.  But I did put a referral in today    I did get a message from hematology and they recommended the patient follow-up with gastroenterology and still themselves.  They would like for him to get the endoscopies performed before they see him.

## 2025-01-03 ENCOUNTER — TELEPHONE (OUTPATIENT)
Dept: GASTROENTEROLOGY | Facility: CLINIC | Age: 70
End: 2025-01-03
Payer: MEDICARE

## 2025-01-03 RX ORDER — SODIUM, POTASSIUM,MAG SULFATES 17.5-3.13G
1 SOLUTION, RECONSTITUTED, ORAL ORAL TAKE AS DIRECTED
Qty: 354 ML | Refills: 0 | Status: SHIPPED | OUTPATIENT
Start: 2025-01-03

## 2025-01-03 NOTE — TELEPHONE ENCOUNTER
PATIENT SCHEDULED FOR DOUBLE WITH [facility] ON 1/8/25 - NEEDS BOWEL PREP.    very light sesitive today and opted to keep hat and glasses on throuhgout tx. Pt demo'd mm spasms during tx with cervical rom and postural ex's, also 1 episode of rt leg spasm. Pt with poor tolerance with progressing cervical ex's and with gentle distraction, pt became very emotional during tx , stating\" I just want to get better, I'm tired of this\"Symptoms seemed to increase after Visit with Dr Gissel Beverly per pt and pt also stated it took a whole day to recover from last session. Treatment Diagnosis: gait abnormality, imbalance, dizziness, LE weakness          Goals:  Short term goals  Time Frame for Short term goals: 4 wks   Short term goal 1: Independent with HEP   Short term goal 2: Report 25% reduction in symptoms   Short term goal 3: Anderson >/= 25/56 to demonstrate improved static balance and decreased risk for falls   Short term goal 4: Ambulate >/= 100' independently with ww     Long term goals  Time Frame for Long term goals : 8 wks   Long term goal 1: Report >/= 50% reduction in overall symptoms to increase activity level   Long term goal 2: Anderson >/= 35/56 to demonstrate improved balance and decreased risk for falls   Long term goal 3: Ambulate >/= 25' with str cane with SBA with good safety awareness   Long term goal 4: Improve bilateral LE strength >/= 4+/5 to improve activity tolerance and balance   Progress toward goals:rom decreasing pain  POST-PAIN       Pain Rating (0-10 pain scale):   7-8/10   Location and pain description same as pre-treatment unless indicated.    Action: [] NA   [] Perform HEP  [x] Meds as prescribed  [] Modalities as prescribed   [] Call Physician     Frequency/Duration:  Plan  Times per week: 2  Plan weeks: 8  Current Treatment Recommendations: Strengthening, ROM, Balance Training, Functional Mobility Training, Transfer Training, Endurance Training, Gait Training, Stair training, Neuromuscular Re-education, Manual Therapy - Soft Tissue Mobilization, Manual Therapy - Joint

## 2025-01-08 ENCOUNTER — OUTSIDE FACILITY SERVICE (OUTPATIENT)
Dept: GASTROENTEROLOGY | Facility: CLINIC | Age: 70
End: 2025-01-08
Payer: MEDICARE

## 2025-01-08 PROCEDURE — 88305 TISSUE EXAM BY PATHOLOGIST: CPT | Performed by: INTERNAL MEDICINE

## 2025-01-08 PROCEDURE — 43239 EGD BIOPSY SINGLE/MULTIPLE: CPT | Performed by: INTERNAL MEDICINE

## 2025-01-09 ENCOUNTER — LAB REQUISITION (OUTPATIENT)
Dept: LAB | Facility: HOSPITAL | Age: 70
End: 2025-01-09
Payer: MEDICARE

## 2025-01-09 DIAGNOSIS — D64.9 ANEMIA, UNSPECIFIED: ICD-10-CM

## 2025-01-13 ENCOUNTER — TELEPHONE (OUTPATIENT)
Dept: GASTROENTEROLOGY | Facility: CLINIC | Age: 70
End: 2025-01-13
Payer: MEDICARE

## 2025-01-13 DIAGNOSIS — K21.00 GASTROESOPHAGEAL REFLUX DISEASE WITH ESOPHAGITIS WITHOUT HEMORRHAGE: Primary | ICD-10-CM

## 2025-01-13 RX ORDER — ESOMEPRAZOLE MAGNESIUM 40 MG/1
40 CAPSULE, DELAYED RELEASE ORAL DAILY
Qty: 30 CAPSULE | Refills: 5 | Status: SHIPPED | OUTPATIENT
Start: 2025-01-13

## 2025-01-13 NOTE — TELEPHONE ENCOUNTER
I called and spoke with Mr. Wen regarding the pathology.  There was evidence of reflux on the upper endoscopy.  There is no evidence for Helicobacter pylori and celiac disease.  I will go ahead and E scribed medication for GERD.  I stated that at this juncture would recommend further evaluation of anemia. This could include SPEP and UPEP.  I reviewed the previous colonoscopy from April 2024 done by Dr. Patel that was unremarkable except for 2 small polyps.  The lab data demonstrated he had occult blood negative study.

## 2025-01-23 DIAGNOSIS — K21.00 GASTROESOPHAGEAL REFLUX DISEASE WITH ESOPHAGITIS WITHOUT HEMORRHAGE: ICD-10-CM

## 2025-01-23 DIAGNOSIS — D64.9 ANEMIA, UNSPECIFIED TYPE: Primary | ICD-10-CM

## 2025-01-23 RX ORDER — FERROUS GLUCONATE 324(38)MG
324 TABLET ORAL
Qty: 30 TABLET | Refills: 0 | Status: CANCELLED | OUTPATIENT
Start: 2025-01-23

## 2025-01-23 RX ORDER — FERROUS GLUCONATE 324(38)MG
324 TABLET ORAL
Qty: 90 TABLET | Refills: 0 | Status: SHIPPED | OUTPATIENT
Start: 2025-01-23

## 2025-01-23 NOTE — TELEPHONE ENCOUNTER
Caller: Mp Wen    Relationship: Self    Best call back number: [phone]     Requested Prescriptions:   Requested Prescriptions     Pending Prescriptions Disp Refills    ferrous gluconate (FERGON) 324 MG tablet 30 tablet 0     Sig: Take 1 tablet by mouth Daily With Breakfast.        Pharmacy where request should be sent: [facility]/PHARMACY #[id number] - [place], [place] - [address] [facility] [phone] [facility] [phone] FX     Last office visit with prescribing clinician: 12/19/2024   Last telemedicine visit with prescribing clinician: Visit date not found   Next office visit with prescribing clinician: Visit date not found     Additional details provided by patient: HAS NONE LEFT.  SHOULD HE KEEP TAKING THIS?    Does the patient have less than a 3 day supply:  [x] Yes  [] No    Would you like a call back once the refill request has been completed: [] Yes [] No    If the office needs to give you a call back, can they leave a voicemail: [] Yes [] No    Dana Mistry Rep   01/23/25 11:03 EST

## 2025-02-03 ENCOUNTER — TELEPHONE (OUTPATIENT)
Dept: FAMILY MEDICINE CLINIC | Facility: CLINIC | Age: 70
End: 2025-02-03
Payer: MEDICARE

## 2025-02-03 ENCOUNTER — LAB (OUTPATIENT)
Dept: LAB | Facility: HOSPITAL | Age: 70
End: 2025-02-03
Payer: MEDICARE

## 2025-02-03 DIAGNOSIS — D50.9 IRON DEFICIENCY ANEMIA, UNSPECIFIED IRON DEFICIENCY ANEMIA TYPE: ICD-10-CM

## 2025-02-03 LAB
ANISOCYTOSIS BLD QL: NORMAL
BASOPHILS # BLD AUTO: 0.06 10*3/MM3 (ref 0–0.2)
BASOPHILS NFR BLD AUTO: 1.3 % (ref 0–1.5)
DEPRECATED RDW RBC AUTO: 70.2 FL (ref 37–54)
EOSINOPHIL # BLD AUTO: 0.12 10*3/MM3 (ref 0–0.4)
EOSINOPHIL NFR BLD AUTO: 2.5 % (ref 0.3–6.2)
ERYTHROCYTE [DISTWIDTH] IN BLOOD BY AUTOMATED COUNT: 23.1 % (ref 12.3–15.4)
HCT VFR BLD AUTO: 41 % (ref 37.5–51)
HGB BLD-MCNC: 12.3 G/DL (ref 13–17.7)
IMM GRANULOCYTES # BLD AUTO: 0.01 10*3/MM3 (ref 0–0.05)
IMM GRANULOCYTES NFR BLD AUTO: 0.2 % (ref 0–0.5)
LYMPHOCYTES # BLD AUTO: 1.86 10*3/MM3 (ref 0.7–3.1)
LYMPHOCYTES NFR BLD AUTO: 38.8 % (ref 19.6–45.3)
MCH RBC QN AUTO: 25.7 PG (ref 26.6–33)
MCHC RBC AUTO-ENTMCNC: 30 G/DL (ref 31.5–35.7)
MCV RBC AUTO: 85.6 FL (ref 79–97)
MONOCYTES # BLD AUTO: 0.71 10*3/MM3 (ref 0.1–0.9)
MONOCYTES NFR BLD AUTO: 14.8 % (ref 5–12)
NEUTROPHILS NFR BLD AUTO: 2.03 10*3/MM3 (ref 1.7–7)
NEUTROPHILS NFR BLD AUTO: 42.4 % (ref 42.7–76)
NRBC BLD AUTO-RTO: 0 /100 WBC (ref 0–0.2)
PLAT MORPH BLD: NORMAL
PLATELET # BLD AUTO: 253 10*3/MM3 (ref 140–450)
PMV BLD AUTO: 10.1 FL (ref 6–12)
RBC # BLD AUTO: 4.79 10*6/MM3 (ref 4.14–5.8)
WBC MORPH BLD: NORMAL
WBC NRBC COR # BLD AUTO: 4.79 10*3/MM3 (ref 3.4–10.8)

## 2025-02-03 PROCEDURE — 36415 COLL VENOUS BLD VENIPUNCTURE: CPT

## 2025-02-03 PROCEDURE — 82728 ASSAY OF FERRITIN: CPT

## 2025-02-03 PROCEDURE — 83540 ASSAY OF IRON: CPT

## 2025-02-03 PROCEDURE — 85025 COMPLETE CBC W/AUTO DIFF WBC: CPT

## 2025-02-03 PROCEDURE — 85007 BL SMEAR W/DIFF WBC COUNT: CPT

## 2025-02-03 PROCEDURE — 84466 ASSAY OF TRANSFERRIN: CPT

## 2025-02-03 NOTE — TELEPHONE ENCOUNTER
Caller: Mp Wen     Relationship:SELF    Best call back number:      What is your medical concern? PATIENT HAD A HEP B VACCINE AT [facility] HOWEVER HE DOESN'T REMEMBER THE DAY; SO HE CALLED BACK TO GET THE 2ND ONE AS HE WAS TOLD THIS WAS IN A SERIES AND THEY HAVE NO RECORD OF THE FIRST SHOT; HE IS ASKING WHAT HE NEEDS TO DO MOVING FORWARD    Is your provider already aware of this issue? NO

## 2025-02-03 NOTE — TELEPHONE ENCOUNTER
HUB TO RELAY    LVM. Please let patient know he got his Hep B Vaccine at our office on 12/19/24. He is due for second dose now and can walk in for it.

## 2025-02-04 ENCOUNTER — TELEPHONE (OUTPATIENT)
Dept: FAMILY MEDICINE CLINIC | Facility: CLINIC | Age: 70
End: 2025-02-04
Payer: MEDICARE

## 2025-02-04 LAB
FERRITIN SERPL-MCNC: 58.1 NG/ML (ref 30–400)
IRON 24H UR-MRATE: 94 MCG/DL (ref 59–158)
IRON SATN MFR SERPL: 22 % (ref 20–50)
TIBC SERPL-MCNC: 429 MCG/DL (ref 298–536)
TRANSFERRIN SERPL-MCNC: 288 MG/DL (ref 200–360)

## 2025-02-04 NOTE — TELEPHONE ENCOUNTER
"Hub relay: \"Hub relay: \"it appears your last hepatitis b vaccine was on 12/19/24 so you are now due your second hepatitis b vaccine, did you want to get this at the pharmacy? You should be able to call them and get this scheduled.  If you have any further questions pleas let us know, you will need one more hepatitis b injection to complete the set and it is administered 6 months from your first injection.\" Lm for pt to call us back.\" Lm for pt to call us back.  "

## 2025-02-04 NOTE — TELEPHONE ENCOUNTER
"Hub relay: \"it appears your last hepatitis b vaccine was on 12/19/24 so you are now due your second hepatitis b vaccine, did you want to get this at the pharmacy? You should be able to call them and get this scheduled.  If you have any further questions pleas let us know, you will need one more hepatitis b injection to complete the set and it is administered 6 months from your first injection.\" Lm for pt to call us back.  "

## 2025-02-10 ENCOUNTER — CONSULT (OUTPATIENT)
Dept: ONCOLOGY | Facility: CLINIC | Age: 70
End: 2025-02-10
Payer: MEDICARE

## 2025-02-10 VITALS
RESPIRATION RATE: 16 BRPM | DIASTOLIC BLOOD PRESSURE: 77 MMHG | HEART RATE: 75 BPM | BODY MASS INDEX: 27.11 KG/M2 | TEMPERATURE: 99.1 F | HEIGHT: 69 IN | SYSTOLIC BLOOD PRESSURE: 134 MMHG | OXYGEN SATURATION: 96 % | WEIGHT: 183 LBS

## 2025-02-10 DIAGNOSIS — D50.0 IRON DEFICIENCY ANEMIA DUE TO CHRONIC BLOOD LOSS: Primary | ICD-10-CM

## 2025-02-10 NOTE — LETTER
February 10, 2025     Howie Raymundo DO  [facility] [address] [address]    Patient: Mp Wen   YOB: 1955   Date of Visit: 2/10/2025     Dear Howie Raymundo DO:       Thank you for referring Mp Wen to me for evaluation. Below are the relevant portions of my assessment and plan of care.    If you have questions, please do not hesitate to call me. I look forward to following Mp along with you.         Sincerely,        Porfirio Freire MD        CC: MD Tani Rodriguez Lee G, MD  02/10/25 1131  Sign when Signing Visit  CHIEF COMPLAINT: Occult cause for iron deficiency anemia good after oral iron    REASON FOR REFERRAL: Iron deficiency anemia      RECORDS OBTAINED  Records of the patients history including those obtained from emergency room primary care and Dr. Betts were reviewed and summarized in detail.    Oncology/Hematology History Overview Note   1.  Iron deficiency anemia with reflux on EGD  2.  Hepatitis C  3.  Hyperlipidemia  4.  Hypertension  5.  Tremor    -12/15/2023 hemoglobin 13.5 MCV 88  -4/25/2024 colonoscopy Dr. Patel tubular adenoma descending colon polyp with hyperplastic sigmoid polyp.  -12/19/2024 hemoglobin 7.9 MCV 70.4  -12/20/2024 hemoglobin 7.2 ferritin 30 iron 19 iron saturation 4% transferrin 328 total iron binding capacity 489.  Normal B12.  CT abdomen pelvis showed no acute findings.  No suspicious hepatic lesion.  No bowel obstruction.  -12/23/2024 hemoglobin 8.8 MCV 73.9  -1/8/2025 Dr. Betts EGD duodenal biopsy unremarkable.  Antrum and body of stomach biopsy showing chronic minimal inflammation with mild reactive epithelial changes.  Esophageal distal biopsy shows squamous mucosa with mild to moderate chronic inflammation of the gastric cardia.  Dr. Betts made referral for further evaluation of his anemia including SPEP and UPEP which were not drawn.  And had normal total protein albumin globulin calcium and creatinine on all  CMP's of which she has had multiple over the last 3 years.  - 2/3/2025 hemoglobin 12.3 MCV 85 otherwise unremarkable CBC.  Ferritin 58.  Iron 94 saturation normal 22% total iron binding capacity normal 429.  Transferrin 288.    -2/10/2025 [facility] hematology consult: The patient had clear-cut iron deficiency anemia with a tubular adenoma in April with Dr. Patel and an EGD in January showing some reflux.  Dr. Betts sent him to me as there was no explanation for his iron deficiency but with excellent response to oral iron now with normal hemoglobin and MCV and ferritin and iron and iron saturation.  Almost assuredly has occult blood loss perhaps in the small bowel but I would not put him through a capsule endoscopy at this junction.  The odds of any other cause of anemia with such a robust response to iron supplementation is exceedingly unlikely.  He will take ferrous every other day with vitamin C and we discussed that in detail today and I will repeat his blood count and iron panel and ferritin prior to return in about 4 months along with his hepatitis C PCR history of hepatitis C and a CMP.  Assuming labs look good then then he will follow-up with Dr. Raymundo for routine health maintenance and CBCs per routine and I would just keep him on the oral iron indefinitely given the occult nature of the iron deficiency.  If oral iron becomes ineffective, I will get him back to Dr. Betts for colonoscopy in light of the tubular adenoma history for repeat colonoscopy and also to assess for celiac disease but I am very doubtful of malabsorption given the excellent response to oral iron thus far.  As for the SPEP and UPEP which were not drawn when he saw Dr. Betts, his CMP was unremarkable with normal total protein, albumin, globulin, ratios thereof as well as alkaline phosphatase and calcium and creatinine in the odds for monoclonal gammopathy related anemia is very low.  Given that he had microcytic anemia with  iron deficient indices that have responded to oral iron I do not think we need to look for other explanations as to the anemia.  Most people in this setting have occult loss in the small bowel that, as long as he is feeling fit and given the low frequency of small bowel malignancy, does not necessarily warrant further attention provided that we can fill the proverbial dike with iron faster than the proverbial dam is leaking.         HISTORY OF PRESENT ILLNESS:  The patient is a 69 y.o.  male, referred for iron deficiency anemia of occult cause responding well to oral iron with no change in the color caliber or consistency of stools prior to oral iron with remote history of hepatitis C.  His sister had a lifetime of anemia but he is not sure the cause.    REVIEW OF SYSTEMS:  No somatic complaints presently and did not feel very fatigued even at the point of his hemoglobin in the sevens    Past Medical History:   Diagnosis Date   • Abnormal EKG    • Acute bacterial prostatitis    • Arrhythmia    • Benign prostatic hypertrophy    • ED (erectile dysfunction) of non-organic origin    • Esophageal reflux    • Hepatitis C    • Hyperlipidemia    • Hypertension    • Labyrinthitis    • Retinal detachment    • Retinal detachment    • Vertigo      Past Surgical History:   Procedure Laterality Date   • CATARACT EXTRACTION Left    • COLONOSCOPY     • HERNIA REPAIR         Current Outpatient Medications on File Prior to Visit   Medication Sig Dispense Refill   • aspirin 81 MG tablet Take 1 tablet by mouth Daily.     • atorvastatin (LIPITOR) 40 MG tablet Take 1 tablet by mouth Every Night. 90 tablet 3   • cholecalciferol (VITAMIN D3) 25 MCG (1000 UT) tablet Take 1 tablet by mouth Daily.     • esomeprazole (nexIUM) 40 MG capsule Take 1 capsule by mouth Daily. 30 capsule 5   • ferrous gluconate (FERGON) 324 MG tablet Take 1 tablet by mouth Daily With Breakfast. 90 tablet 0   • lisinopril (PRINIVIL,ZESTRIL) 5 MG tablet Take 1 tablet  "by mouth Daily. 90 tablet 3   • tadalafil (Cialis) 10 MG tablet Take 1 tablet by mouth Daily As Needed for Erectile Dysfunction. 30 tablet 2   • sodium-potassium-magnesium sulfates (Suprep Bowel Prep Kit) 17.5-3.13-1.6 GM/177ML solution oral solution Take 1 bottle by mouth Take As Directed. 354 mL 0     No current facility-administered medications on file prior to visit.       No Known Allergies    Social History     Socioeconomic History   • Marital status:    Tobacco Use   • Smoking status: Former     Current packs/day: 0.00     Average packs/day: 0.5 packs/day for 28.0 years (14.0 ttl pk-yrs)     Types: Cigarettes     Start date: 1968     Quit date: 1996     Years since quittin.8     Passive exposure: Past   • Smokeless tobacco: Never   Vaping Use   • Vaping status: Never Used   Substance and Sexual Activity   • Alcohol use: Not Currently   • Drug use: Never   • Sexual activity: Yes     Partners: Female     Birth control/protection: None       Family History   Problem Relation Age of Onset   • Coronary artery disease Mother    • Heart attack Mother             • Prostate cancer Father    • Hypertension Sister    • Hypertension Sister    • Hypertension Brother    • Hyperlipidemia Brother    • Hypertension Brother    • No Known Problems Paternal Grandmother        PHYSICAL EXAM:  No jaundice icterus or pallor.  No respiratory distress.    /77   Pulse 75   Temp 99.1 °F (37.3 °C)   Resp 16   Ht 175.3 cm (69\")   Wt 83 kg (183 lb)   SpO2 96%   BMI 27.02 kg/m²     ECOG score: 0           ECOG: (0) Fully Active - Able to Carry On All Pre-disease Performance Without Restriction    Lab Results   Component Value Date    HGB 12.3 (L) 2025    HCT 41.0 2025    MCV 85.6 2025     2025    WBC 4.79 2025    NEUTROABS 2.03 2025    LYMPHSABS 1.86 2025    MONOSABS 0.71 2025    EOSABS 0.12 2025    BASOSABS 0.06 2025     Lab " Results   Component Value Date    GLUCOSE 101 (H) 12/20/2024    BUN 14 12/20/2024    CREATININE 1.16 12/20/2024     (L) 12/20/2024    K 4.7 12/20/2024     12/20/2024    CO2 27.0 12/20/2024    CALCIUM 9.1 12/20/2024    PROTEINTOT 7.3 12/20/2024    ALBUMIN 4.2 12/20/2024    BILITOT 0.4 12/20/2024    ALKPHOS 74 12/20/2024    AST 19 12/20/2024    ALT 12 12/20/2024         Assessment & Plan    1.  Iron deficiency anemia with reflux on EGD  2.  Hepatitis C  3.  Hyperlipidemia  4.  Hypertension  5.  Tremor    -12/15/2023 hemoglobin 13.5 MCV 88  -4/25/2024 colonoscopy Dr. Patel tubular adenoma descending colon polyp with hyperplastic sigmoid polyp.  -12/19/2024 hemoglobin 7.9 MCV 70.4  -12/20/2024 hemoglobin 7.2 ferritin 30 iron 19 iron saturation 4% transferrin 328 total iron binding capacity 489.  Normal B12.  CT abdomen pelvis showed no acute findings.  No suspicious hepatic lesion.  No bowel obstruction.  -12/23/2024 hemoglobin 8.8 MCV 73.9  -1/8/2025 Dr. Betts EGD duodenal biopsy unremarkable.  Antrum and body of stomach biopsy showing chronic minimal inflammation with mild reactive epithelial changes.  Esophageal distal biopsy shows squamous mucosa with mild to moderate chronic inflammation of the gastric cardia.  Dr. Betts made referral for further evaluation of his anemia including SPEP and UPEP which were not drawn.  And had normal total protein albumin globulin calcium and creatinine on all CMP's of which she has had multiple over the last 3 years.  - 2/3/2025 hemoglobin 12.3 MCV 85 otherwise unremarkable CBC.  Ferritin 58.  Iron 94 saturation normal 22% total iron binding capacity normal 429.  Transferrin 288.    -2/10/2025 [facility] hematology consult: The patient had clear-cut iron deficiency anemia with a tubular adenoma in April with Dr. Patel and an EGD in January showing some reflux.  Dr. Betts sent him to me as there was no explanation for his iron deficiency but with excellent  response to oral iron now with normal hemoglobin and MCV and ferritin and iron and iron saturation.  Almost assuredly has occult blood loss perhaps in the small bowel but I would not put him through a capsule endoscopy at this junction.  The odds of any other cause of anemia with such a robust response to iron supplementation is exceedingly unlikely.  He will take ferrous every other day with vitamin C and we discussed that in detail today and I will repeat his blood count and iron panel and ferritin prior to return in about 4 months along with his hepatitis C PCR history of hepatitis C and a CMP.  Assuming labs look good then then he will follow-up with Dr. Raymundo for routine health maintenance and CBCs per routine and I would just keep him on the oral iron indefinitely given the occult nature of the iron deficiency.  If oral iron becomes ineffective, I will get him back to Dr. Betts for colonoscopy in light of the tubular adenoma history for repeat colonoscopy and also to assess for celiac disease but I am very doubtful of malabsorption given the excellent response to oral iron thus far.  As for the SPEP and UPEP which were not drawn when he saw Dr. Betts, his CMP was unremarkable with normal total protein, albumin, globulin, ratios thereof as well as alkaline phosphatase and calcium and creatinine in the odds for monoclonal gammopathy related anemia is very low.  Given that he had microcytic anemia with iron deficient indices that have responded to oral iron I do not think we need to look for other explanations as to the anemia.  Most people in this setting have occult loss in the small bowel that, as long as he is feeling fit and given the low frequency of small bowel malignancy, does not necessarily warrant further attention provided that we can fill the proverbial dike with iron faster than the proverbial dam is leaking.    Total time of care today inclusive of time spent today prior to patient's arrival  reviewing past records and cataloging during visit interviewing Mora signs or symptoms of his disease and management thereof and after visit instituting this plan took 1 hour patient care time throughout the day today.    Porfirio Freire MD    2/10/2025

## 2025-02-10 NOTE — PROGRESS NOTES
CHIEF COMPLAINT: Occult cause for iron deficiency anemia good after oral iron    REASON FOR REFERRAL: Iron deficiency anemia      RECORDS OBTAINED  Records of the patients history including those obtained from emergency room primary care and Dr. Betts were reviewed and summarized in detail.    Oncology/Hematology History Overview Note   1.  Iron deficiency anemia with reflux on EGD  2.  Hepatitis C  3.  Hyperlipidemia  4.  Hypertension  5.  Tremor    -12/15/2023 hemoglobin 13.5 MCV 88  -4/25/2024 colonoscopy Dr. Patel tubular adenoma descending colon polyp with hyperplastic sigmoid polyp.  -12/19/2024 hemoglobin 7.9 MCV 70.4  -12/20/2024 hemoglobin 7.2 ferritin 30 iron 19 iron saturation 4% transferrin 328 total iron binding capacity 489.  Normal B12.  CT abdomen pelvis showed no acute findings.  No suspicious hepatic lesion.  No bowel obstruction.  -12/23/2024 hemoglobin 8.8 MCV 73.9  -1/8/2025 Dr. Betts EGD duodenal biopsy unremarkable.  Antrum and body of stomach biopsy showing chronic minimal inflammation with mild reactive epithelial changes.  Esophageal distal biopsy shows squamous mucosa with mild to moderate chronic inflammation of the gastric cardia.  Dr. Betts made referral for further evaluation of his anemia including SPEP and UPEP which were not drawn.  And had normal total protein albumin globulin calcium and creatinine on all CMP's of which she has had multiple over the last 3 years.  - 2/3/2025 hemoglobin 12.3 MCV 85 otherwise unremarkable CBC.  Ferritin 58.  Iron 94 saturation normal 22% total iron binding capacity normal 429.  Transferrin 288.    -2/10/2025 [facility] hematology consult: The patient had clear-cut iron deficiency anemia with a tubular adenoma in April with Dr. Patel and an EGD in January showing some reflux.  Dr. Betts sent him to me as there was no explanation for his iron deficiency but with excellent response to oral iron now with normal hemoglobin and MCV and  ferritin and iron and iron saturation.  Almost assuredly has occult blood loss perhaps in the small bowel but I would not put him through a capsule endoscopy at this junction.  The odds of any other cause of anemia with such a robust response to iron supplementation is exceedingly unlikely.  He will take ferrous every other day with vitamin C and we discussed that in detail today and I will repeat his blood count and iron panel and ferritin prior to return in about 4 months along with his hepatitis C PCR history of hepatitis C and a CMP.  Assuming labs look good then then he will follow-up with Dr. Raymundo for routine health maintenance and CBCs per routine and I would just keep him on the oral iron indefinitely given the occult nature of the iron deficiency.  If oral iron becomes ineffective, I will get him back to Dr. Betts for colonoscopy in light of the tubular adenoma history for repeat colonoscopy and also to assess for celiac disease but I am very doubtful of malabsorption given the excellent response to oral iron thus far.  As for the SPEP and UPEP which were not drawn when he saw Dr. Betts, his CMP was unremarkable with normal total protein, albumin, globulin, ratios thereof as well as alkaline phosphatase and calcium and creatinine in the odds for monoclonal gammopathy related anemia is very low.  Given that he had microcytic anemia with iron deficient indices that have responded to oral iron I do not think we need to look for other explanations as to the anemia.  Most people in this setting have occult loss in the small bowel that, as long as he is feeling fit and given the low frequency of small bowel malignancy, does not necessarily warrant further attention provided that we can fill the proverbial dike with iron faster than the proverbial dam is leaking.         HISTORY OF PRESENT ILLNESS:  The patient is a 69 y.o.  male, referred for iron deficiency anemia of occult cause responding well to oral  iron with no change in the color caliber or consistency of stools prior to oral iron with remote history of hepatitis C.  His sister had a lifetime of anemia but he is not sure the cause.    REVIEW OF SYSTEMS:  No somatic complaints presently and did not feel very fatigued even at the point of his hemoglobin in the sevens    Past Medical History:   Diagnosis Date    Abnormal EKG     Acute bacterial prostatitis     Arrhythmia     Benign prostatic hypertrophy     ED (erectile dysfunction) of non-organic origin     Esophageal reflux     Hepatitis C     Hyperlipidemia     Hypertension     Labyrinthitis     Retinal detachment     Retinal detachment     Vertigo      Past Surgical History:   Procedure Laterality Date    CATARACT EXTRACTION Left     COLONOSCOPY      HERNIA REPAIR         Current Outpatient Medications on File Prior to Visit   Medication Sig Dispense Refill    aspirin 81 MG tablet Take 1 tablet by mouth Daily.      atorvastatin (LIPITOR) 40 MG tablet Take 1 tablet by mouth Every Night. 90 tablet 3    cholecalciferol (VITAMIN D3) 25 MCG (1000 UT) tablet Take 1 tablet by mouth Daily.      esomeprazole (nexIUM) 40 MG capsule Take 1 capsule by mouth Daily. 30 capsule 5    ferrous gluconate (FERGON) 324 MG tablet Take 1 tablet by mouth Daily With Breakfast. 90 tablet 0    lisinopril (PRINIVIL,ZESTRIL) 5 MG tablet Take 1 tablet by mouth Daily. 90 tablet 3    tadalafil (Cialis) 10 MG tablet Take 1 tablet by mouth Daily As Needed for Erectile Dysfunction. 30 tablet 2    sodium-potassium-magnesium sulfates (Suprep Bowel Prep Kit) 17.5-3.13-1.6 GM/177ML solution oral solution Take 1 bottle by mouth Take As Directed. 354 mL 0     No current facility-administered medications on file prior to visit.       No Known Allergies    Social History     Socioeconomic History    Marital status:    Tobacco Use    Smoking status: Former     Current packs/day: 0.00     Average packs/day: 0.5 packs/day for 28.0 years (14.0 ttl  "pk-yrs)     Types: Cigarettes     Start date: 1968     Quit date: 1996     Years since quittin.8     Passive exposure: Past    Smokeless tobacco: Never   Vaping Use    Vaping status: Never Used   Substance and Sexual Activity    Alcohol use: Not Currently    Drug use: Never    Sexual activity: Yes     Partners: Female     Birth control/protection: None       Family History   Problem Relation Age of Onset    Coronary artery disease Mother     Heart attack Mother              Prostate cancer Father     Hypertension Sister     Hypertension Sister     Hypertension Brother     Hyperlipidemia Brother     Hypertension Brother     No Known Problems Paternal Grandmother        PHYSICAL EXAM:  No jaundice icterus or pallor.  No respiratory distress.    /77   Pulse 75   Temp 99.1 °F (37.3 °C)   Resp 16   Ht 175.3 cm (69\")   Wt 83 kg (183 lb)   SpO2 96%   BMI 27.02 kg/m²     ECOG score: 0           ECOG: (0) Fully Active - Able to Carry On All Pre-disease Performance Without Restriction    Lab Results   Component Value Date    HGB 12.3 (L) 2025    HCT 41.0 2025    MCV 85.6 2025     2025    WBC 4.79 2025    NEUTROABS 2.03 2025    LYMPHSABS 1.86 2025    MONOSABS 0.71 2025    EOSABS 0.12 2025    BASOSABS 0.06 2025     Lab Results   Component Value Date    GLUCOSE 101 (H) 2024    BUN 14 2024    CREATININE 1.16 2024     (L) 2024    K 4.7 2024     2024    CO2 27.0 2024    CALCIUM 9.1 2024    PROTEINTOT 7.3 2024    ALBUMIN 4.2 2024    BILITOT 0.4 2024    ALKPHOS 74 2024    AST 19 2024    ALT 12 2024         Assessment & Plan     1.  Iron deficiency anemia with reflux on EGD  2.  Hepatitis C  3.  Hyperlipidemia  4.  Hypertension  5.  Tremor    -12/15/2023 hemoglobin 13.5 MCV 88  -2024 colonoscopy Dr. Patel tubular adenoma " descending colon polyp with hyperplastic sigmoid polyp.  -12/19/2024 hemoglobin 7.9 MCV 70.4  -12/20/2024 hemoglobin 7.2 ferritin 30 iron 19 iron saturation 4% transferrin 328 total iron binding capacity 489.  Normal B12.  CT abdomen pelvis showed no acute findings.  No suspicious hepatic lesion.  No bowel obstruction.  -12/23/2024 hemoglobin 8.8 MCV 73.9  -1/8/2025 Dr. Betts EGD duodenal biopsy unremarkable.  Antrum and body of stomach biopsy showing chronic minimal inflammation with mild reactive epithelial changes.  Esophageal distal biopsy shows squamous mucosa with mild to moderate chronic inflammation of the gastric cardia.  Dr. Betts made referral for further evaluation of his anemia including SPEP and UPEP which were not drawn.  And had normal total protein albumin globulin calcium and creatinine on all CMP's of which she has had multiple over the last 3 years.  - 2/3/2025 hemoglobin 12.3 MCV 85 otherwise unremarkable CBC.  Ferritin 58.  Iron 94 saturation normal 22% total iron binding capacity normal 429.  Transferrin 288.    -2/10/2025 [facility] hematology consult: The patient had clear-cut iron deficiency anemia with a tubular adenoma in April with Dr. Patel and an EGD in January showing some reflux.  Dr. Betts sent him to me as there was no explanation for his iron deficiency but with excellent response to oral iron now with normal hemoglobin and MCV and ferritin and iron and iron saturation.  Almost assuredly has occult blood loss perhaps in the small bowel but I would not put him through a capsule endoscopy at this junction.  The odds of any other cause of anemia with such a robust response to iron supplementation is exceedingly unlikely.  He will take ferrous every other day with vitamin C and we discussed that in detail today and I will repeat his blood count and iron panel and ferritin prior to return in about 4 months along with his hepatitis C PCR history of hepatitis C and a CMP.   Assuming labs look good then then he will follow-up with Dr. Raymundo for routine health maintenance and CBCs per routine and I would just keep him on the oral iron indefinitely given the occult nature of the iron deficiency.  If oral iron becomes ineffective, I will get him back to Dr. Betts for colonoscopy in light of the tubular adenoma history for repeat colonoscopy and also to assess for celiac disease but I am very doubtful of malabsorption given the excellent response to oral iron thus far.  As for the SPEP and UPEP which were not drawn when he saw Dr. Betts, his CMP was unremarkable with normal total protein, albumin, globulin, ratios thereof as well as alkaline phosphatase and calcium and creatinine in the odds for monoclonal gammopathy related anemia is very low.  Given that he had microcytic anemia with iron deficient indices that have responded to oral iron I do not think we need to look for other explanations as to the anemia.  Most people in this setting have occult loss in the small bowel that, as long as he is feeling fit and given the low frequency of small bowel malignancy, does not necessarily warrant further attention provided that we can fill the proverbial dike with iron faster than the proverbial dam is leaking.    Total time of care today inclusive of time spent today prior to patient's arrival reviewing past records and cataloging during visit interviewing Mora signs or symptoms of his disease and management thereof and after visit instituting this plan took 1 hour patient care time throughout the day today.    Porfirio Freire MD    2/10/2025   No masses; no nipple discharge not examined

## 2025-02-28 ENCOUNTER — PATIENT MESSAGE (OUTPATIENT)
Dept: NEUROLOGY | Facility: CLINIC | Age: 70
End: 2025-02-28
Payer: MEDICARE

## 2025-06-03 ENCOUNTER — LAB (OUTPATIENT)
Dept: LAB | Facility: HOSPITAL | Age: 70
End: 2025-06-03
Payer: MEDICARE

## 2025-06-03 DIAGNOSIS — D50.0 IRON DEFICIENCY ANEMIA DUE TO CHRONIC BLOOD LOSS: ICD-10-CM

## 2025-06-03 LAB
ALBUMIN SERPL-MCNC: 4.5 G/DL (ref 3.5–5.2)
ALBUMIN/GLOB SERPL: 1.3 G/DL
ALP SERPL-CCNC: 79 U/L (ref 39–117)
ALT SERPL W P-5'-P-CCNC: 20 U/L (ref 1–41)
ANION GAP SERPL CALCULATED.3IONS-SCNC: 11 MMOL/L (ref 5–15)
AST SERPL-CCNC: 21 U/L (ref 1–40)
BASOPHILS # BLD AUTO: 0.07 10*3/MM3 (ref 0–0.2)
BASOPHILS NFR BLD AUTO: 1.4 % (ref 0–1.5)
BILIRUB SERPL-MCNC: 1 MG/DL (ref 0–1.2)
BUN SERPL-MCNC: 11.2 MG/DL (ref 8–23)
BUN/CREAT SERPL: 9.8 (ref 7–25)
CALCIUM SPEC-SCNC: 9.9 MG/DL (ref 8.6–10.5)
CHLORIDE SERPL-SCNC: 98 MMOL/L (ref 98–107)
CO2 SERPL-SCNC: 28 MMOL/L (ref 22–29)
CREAT SERPL-MCNC: 1.14 MG/DL (ref 0.76–1.27)
DEPRECATED RDW RBC AUTO: 44.4 FL (ref 37–54)
EGFRCR SERPLBLD CKD-EPI 2021: 69.2 ML/MIN/1.73
EOSINOPHIL # BLD AUTO: 0.15 10*3/MM3 (ref 0–0.4)
EOSINOPHIL NFR BLD AUTO: 2.9 % (ref 0.3–6.2)
ERYTHROCYTE [DISTWIDTH] IN BLOOD BY AUTOMATED COUNT: 13 % (ref 12.3–15.4)
FERRITIN SERPL-MCNC: 40.34 NG/ML (ref 30–400)
GLOBULIN UR ELPH-MCNC: 3.6 GM/DL
GLUCOSE SERPL-MCNC: 98 MG/DL (ref 65–99)
HCT VFR BLD AUTO: 43.3 % (ref 37.5–51)
HGB BLD-MCNC: 14.1 G/DL (ref 13–17.7)
IMM GRANULOCYTES # BLD AUTO: 0.01 10*3/MM3 (ref 0–0.05)
IMM GRANULOCYTES NFR BLD AUTO: 0.2 % (ref 0–0.5)
IRON 24H UR-MRATE: 85 MCG/DL (ref 59–158)
IRON SATN MFR SERPL: 21 % (ref 20–50)
LYMPHOCYTES # BLD AUTO: 2.32 10*3/MM3 (ref 0.7–3.1)
LYMPHOCYTES NFR BLD AUTO: 45.5 % (ref 19.6–45.3)
MCH RBC QN AUTO: 30.4 PG (ref 26.6–33)
MCHC RBC AUTO-ENTMCNC: 32.6 G/DL (ref 31.5–35.7)
MCV RBC AUTO: 93.3 FL (ref 79–97)
MONOCYTES # BLD AUTO: 0.69 10*3/MM3 (ref 0.1–0.9)
MONOCYTES NFR BLD AUTO: 13.5 % (ref 5–12)
NEUTROPHILS NFR BLD AUTO: 1.86 10*3/MM3 (ref 1.7–7)
NEUTROPHILS NFR BLD AUTO: 36.5 % (ref 42.7–76)
NRBC BLD AUTO-RTO: 0 /100 WBC (ref 0–0.2)
PLATELET # BLD AUTO: 258 10*3/MM3 (ref 140–450)
PMV BLD AUTO: 10.4 FL (ref 6–12)
POTASSIUM SERPL-SCNC: 4.2 MMOL/L (ref 3.5–5.2)
PROT SERPL-MCNC: 8.1 G/DL (ref 6–8.5)
RBC # BLD AUTO: 4.64 10*6/MM3 (ref 4.14–5.8)
SODIUM SERPL-SCNC: 137 MMOL/L (ref 136–145)
TIBC SERPL-MCNC: 411 MCG/DL (ref 298–536)
TRANSFERRIN SERPL-MCNC: 276 MG/DL (ref 200–360)
WBC NRBC COR # BLD AUTO: 5.1 10*3/MM3 (ref 3.4–10.8)

## 2025-06-03 PROCEDURE — 87522 HEPATITIS C REVRS TRNSCRPJ: CPT

## 2025-06-03 PROCEDURE — 82728 ASSAY OF FERRITIN: CPT

## 2025-06-03 PROCEDURE — 84466 ASSAY OF TRANSFERRIN: CPT

## 2025-06-03 PROCEDURE — 83540 ASSAY OF IRON: CPT

## 2025-06-03 PROCEDURE — 80053 COMPREHEN METABOLIC PANEL: CPT

## 2025-06-03 PROCEDURE — 85025 COMPLETE CBC W/AUTO DIFF WBC: CPT

## 2025-06-03 PROCEDURE — 36415 COLL VENOUS BLD VENIPUNCTURE: CPT

## 2025-06-06 LAB
HCV RNA SERPL NAA+PROBE-ACNC: NORMAL IU/ML
REF LAB TEST REF RANGE: NORMAL

## 2025-06-09 ENCOUNTER — OFFICE VISIT (OUTPATIENT)
Dept: ONCOLOGY | Facility: CLINIC | Age: 70
End: 2025-06-09
Payer: MEDICARE

## 2025-06-09 VITALS
WEIGHT: 181 LBS | HEART RATE: 74 BPM | SYSTOLIC BLOOD PRESSURE: 111 MMHG | TEMPERATURE: 98.3 F | OXYGEN SATURATION: 97 % | RESPIRATION RATE: 18 BRPM | HEIGHT: 69 IN | DIASTOLIC BLOOD PRESSURE: 70 MMHG | BODY MASS INDEX: 26.81 KG/M2

## 2025-06-09 DIAGNOSIS — K21.00 GASTROESOPHAGEAL REFLUX DISEASE WITH ESOPHAGITIS WITHOUT HEMORRHAGE: Primary | ICD-10-CM

## 2025-06-09 PROCEDURE — 1160F RVW MEDS BY RX/DR IN RCRD: CPT | Performed by: INTERNAL MEDICINE

## 2025-06-09 PROCEDURE — 1159F MED LIST DOCD IN RCRD: CPT | Performed by: INTERNAL MEDICINE

## 2025-06-09 PROCEDURE — 3074F SYST BP LT 130 MM HG: CPT | Performed by: INTERNAL MEDICINE

## 2025-06-09 PROCEDURE — 1126F AMNT PAIN NOTED NONE PRSNT: CPT | Performed by: INTERNAL MEDICINE

## 2025-06-09 PROCEDURE — 99214 OFFICE O/P EST MOD 30 MIN: CPT | Performed by: INTERNAL MEDICINE

## 2025-06-09 PROCEDURE — 3078F DIAST BP <80 MM HG: CPT | Performed by: INTERNAL MEDICINE

## 2025-06-09 NOTE — PROGRESS NOTES
CHIEF COMPLAINT: Occasional atypical chest pain left chest fleeting in seconds and sometimes exacerbated by raising his arms    Problem List:  Oncology/Hematology History Overview Note   1.  Iron deficiency anemia with reflux on EGD  2.  Hepatitis C  3.  Hyperlipidemia  4.  Hypertension  5.  Tremor    -12/15/2023 hemoglobin 13.5 MCV 88  -4/25/2024 colonoscopy Dr. Patel tubular adenoma descending colon polyp with hyperplastic sigmoid polyp.  -12/19/2024 hemoglobin 7.9 MCV 70.4  -12/20/2024 hemoglobin 7.2 ferritin 30 iron 19 iron saturation 4% transferrin 328 total iron binding capacity 489.  Normal B12.  CT abdomen pelvis showed no acute findings.  No suspicious hepatic lesion.  No bowel obstruction.  -12/23/2024 hemoglobin 8.8 MCV 73.9  -1/8/2025 Dr. Betts EGD duodenal biopsy unremarkable.  Antrum and body of stomach biopsy showing chronic minimal inflammation with mild reactive epithelial changes.  Esophageal distal biopsy shows squamous mucosa with mild to moderate chronic inflammation of the gastric cardia.  Dr. Betts made referral for further evaluation of his anemia including SPEP and UPEP which were not drawn.  And had normal total protein albumin globulin calcium and creatinine on all CMP's of which she has had multiple over the last 3 years.  - 2/3/2025 hemoglobin 12.3 MCV 85 otherwise unremarkable CBC.  Ferritin 58.  Iron 94 saturation normal 22% total iron binding capacity normal 429.  Transferrin 288.    -2/10/2025 [facility] hematology consult: The patient had clear-cut iron deficiency anemia with a tubular adenoma in April with Dr. Patel and an EGD in January showing some reflux.  Dr. Betts sent him to me as there was no explanation for his iron deficiency but with excellent response to oral iron now with normal hemoglobin and MCV and ferritin and iron and iron saturation.  Almost assuredly has occult blood loss perhaps in the small bowel but I would not put him through a capsule endoscopy  at this junction.  The odds of any other cause of anemia with such a robust response to iron supplementation is exceedingly unlikely.  He will take ferrous every other day with vitamin C and we discussed that in detail today and I will repeat his blood count and iron panel and ferritin prior to return in about 4 months along with his hepatitis C PCR history of hepatitis C and a CMP.  Assuming labs look good then then he will follow-up with Dr. Raymundo for routine health maintenance and CBCs per routine and I would just keep him on the oral iron indefinitely given the occult nature of the iron deficiency.  If oral iron becomes ineffective, I will get him back to Dr. Betts for colonoscopy in light of the tubular adenoma history for repeat colonoscopy and also to assess for celiac disease but I am very doubtful of malabsorption given the excellent response to oral iron thus far.  As for the SPEP and UPEP which were not drawn when he saw Dr. Betts, his CMP was unremarkable with normal total protein, albumin, globulin, ratios thereof as well as alkaline phosphatase and calcium and creatinine in the odds for monoclonal gammopathy related anemia is very low.  Given that he had microcytic anemia with iron deficient indices that have responded to oral iron I do not think we need to look for other explanations as to the anemia.  Most people in this setting have occult loss in the small bowel that, as long as he is feeling fit and given the low frequency of small bowel malignancy, does not necessarily warrant further attention provided that we can fill the proverbial dike with iron faster than the proverbial dam is leaking.      No history exists.       HISTORY OF PRESENT ILLNESS:  The patient is a 70 y.o. male, here for follow up on management of iron deficiency anemia feeling great    Past Medical History:   Diagnosis Date    Abnormal EKG     Acute bacterial prostatitis     Arrhythmia     Benign prostatic hypertrophy   "   ED (erectile dysfunction) of non-organic origin     Esophageal reflux     Hepatitis C     Hyperlipidemia     Hypertension     Labyrinthitis     Retinal detachment     Retinal detachment     Vertigo      Past Surgical History:   Procedure Laterality Date    CATARACT EXTRACTION Left     COLONOSCOPY      HERNIA REPAIR         No Known Allergies    Family History and Social History reviewed and changed as necessary    REVIEW OF SYSTEM:   No new somatic complaints other than occasional odd chest pain as described nonexertional.    PHYSICAL EXAM:  Pallor.  No respiratory distress.    Vitals:    06/09/25 1136   BP: 111/70   Pulse: 74   Resp: 18   Temp: 98.3 °F (36.8 °C)   SpO2: 97%   Weight: 82.1 kg (181 lb)   Height: 175.3 cm (69\")     Vitals:    06/09/25 1136   PainSc: 0-No pain          ECOG score: 1           Vitals reviewed.    ECOG: (0) Fully Active - Able to Carry On All Pre-disease Performance Without Restriction    Lab Results   Component Value Date    HGB 14.1 06/03/2025    HCT 43.3 06/03/2025    MCV 93.3 06/03/2025     06/03/2025    WBC 5.10 06/03/2025    NEUTROABS 1.86 06/03/2025    LYMPHSABS 2.32 06/03/2025    MONOSABS 0.69 06/03/2025    EOSABS 0.15 06/03/2025    BASOSABS 0.07 06/03/2025       Lab Results   Component Value Date    GLUCOSE 98 06/03/2025    BUN 11.2 06/03/2025    CREATININE 1.14 06/03/2025     06/03/2025    K 4.2 06/03/2025    CL 98 06/03/2025    CO2 28.0 06/03/2025    CALCIUM 9.9 06/03/2025    PROTEINTOT 8.1 06/03/2025    ALBUMIN 4.5 06/03/2025    BILITOT 1.0 06/03/2025    ALKPHOS 79 06/03/2025    AST 21 06/03/2025    ALT 20 06/03/2025             ASSESSMENT & PLAN:  1.  Iron deficiency anemia with reflux on EGD  2.  Hepatitis C  3.  Hyperlipidemia  4.  Hypertension  5.  Tremor     -12/15/2023 hemoglobin 13.5 MCV 88  -4/25/2024 colonoscopy Dr. Patel tubular adenoma descending colon polyp with hyperplastic sigmoid polyp.  -12/19/2024 hemoglobin 7.9 MCV 70.4  -12/20/2024 " hemoglobin 7.2 ferritin 30 iron 19 iron saturation 4% transferrin 328 total iron binding capacity 489.  Normal B12.  CT abdomen pelvis showed no acute findings.  No suspicious hepatic lesion.  No bowel obstruction.  -12/23/2024 hemoglobin 8.8 MCV 73.9  -1/8/2025 Dr. Betts EGD duodenal biopsy unremarkable.  Antrum and body of stomach biopsy showing chronic minimal inflammation with mild reactive epithelial changes.  Esophageal distal biopsy shows squamous mucosa with mild to moderate chronic inflammation of the gastric cardia.  Dr. Betts made referral for further evaluation of his anemia including SPEP and UPEP which were not drawn.  And had normal total protein albumin globulin calcium and creatinine on all CMP's of which she has had multiple over the last 3 years.  - 2/3/2025 hemoglobin 12.3 MCV 85 otherwise unremarkable CBC.  Ferritin 58.  Iron 94 saturation normal 22% total iron binding capacity normal 429.  Transferrin 288.     -2/10/2025 [facility] consult: The patient had clear-cut iron deficiency anemia with a tubular adenoma in April with Dr. Patel and an EGD in January showing some reflux.  Dr. Betts sent him to me as there was no explanation for his iron deficiency but with excellent response to oral iron now with normal hemoglobin and MCV and ferritin and iron and iron saturation.  Almost assuredly has occult blood loss perhaps in the small bowel but I would not put him through a capsule endoscopy at this junction.  The odds of any other cause of anemia with such a robust response to iron supplementation is exceedingly unlikely.  He will take ferrous every other day with vitamin C and we discussed that in detail today and I will repeat his blood count and iron panel and ferritin prior to return in about 4 months along with his hepatitis C PCR history of hepatitis C and a CMP.  Assuming labs look good then then he will follow-up with Dr. Raymundo for routine health maintenance and CBCs  per routine and I would just keep him on the oral iron indefinitely given the occult nature of the iron deficiency.  If oral iron becomes ineffective, I will get him back to Dr. Betts for colonoscopy in light of the tubular adenoma history for repeat colonoscopy and also to assess for celiac disease but I am very doubtful of malabsorption given the excellent response to oral iron thus far.  As for the SPEP and UPEP which were not drawn when he saw Dr. Betts, his CMP was unremarkable with normal total protein, albumin, globulin, ratios thereof as well as alkaline phosphatase and calcium and creatinine in the odds for monoclonal gammopathy related anemia is very low.  Given that he had microcytic anemia with iron deficient indices that have responded to oral iron I do not think we need to look for other explanations as to the anemia.  Most people in this setting have occult loss in the small bowel that, as long as he is feeling fit and given the low frequency of small bowel malignancy, does not necessarily warrant further attention provided that we can fill the proverbial dike with iron faster than the proverbial dam is leaking.    - 6/9/2025 [facility] follow-up: 6/3/2025 hemoglobin now 14.1 with MCV now 93.3 with normal ferritin 40 and normal iron 85 with normal saturation 21%.  He will continue ferrous sulfate every other day with vitamin C to improve absorption and follow-up with Dr. Betts and primary care.  No need for follow-up with hematology.  Having some occasional weird chest pain not exertional but I asked him to mention to Dr. Raymundo to get worked up further perhaps with a stress test but I will defer to him.  Unlikely to be esophageal.    Time of care today inclusive time reviewing interval data and during visit translating to patient and after visit communicating to his other physicians took 35 minutes patient care time throughout the day today.  Porfirio Freire MD    06/09/2025

## 2025-06-09 NOTE — LETTER
June 9, 2025     Howie Raymundo DO  [facility] [address]    Patient: Mp Wen   YOB: 1955   Date of Visit: 6/9/2025     Dear Howie Raymundo DO:       Thank you for referring Mp Wen to me for evaluation. Below are the relevant portions of my assessment and plan of care.    If you have questions, please do not hesitate to call me. I look forward to following Mp along with you.         Sincerely,        Porfirio Freire MD        CC: MD Tani Rodriguez Lee G, MD  06/09/25 1201  Sign when Signing Visit  CHIEF COMPLAINT: Occasional atypical chest pain left chest fleeting in seconds and sometimes exacerbated by raising his arms    Problem List:  Oncology/Hematology History Overview Note   1.  Iron deficiency anemia with reflux on EGD  2.  Hepatitis C  3.  Hyperlipidemia  4.  Hypertension  5.  Tremor    -12/15/2023 hemoglobin 13.5 MCV 88  -4/25/2024 colonoscopy Dr. Patel tubular adenoma descending colon polyp with hyperplastic sigmoid polyp.  -12/19/2024 hemoglobin 7.9 MCV 70.4  -12/20/2024 hemoglobin 7.2 ferritin 30 iron 19 iron saturation 4% transferrin 328 total iron binding capacity 489.  Normal B12.  CT abdomen pelvis showed no acute findings.  No suspicious hepatic lesion.  No bowel obstruction.  -12/23/2024 hemoglobin 8.8 MCV 73.9  -1/8/2025 Dr. Betts EGD duodenal biopsy unremarkable.  Antrum and body of stomach biopsy showing chronic minimal inflammation with mild reactive epithelial changes.  Esophageal distal biopsy shows squamous mucosa with mild to moderate chronic inflammation of the gastric cardia.  Dr. Betts made referral for further evaluation of his anemia including SPEP and UPEP which were not drawn.  And had normal total protein albumin globulin calcium and creatinine on all CMP's of which she has had multiple over the last 3 years.  - 2/3/2025 hemoglobin 12.3 MCV 85 otherwise unremarkable CBC.  Ferritin 58.  Iron 94 saturation normal  22% total iron binding capacity normal 429.  Transferrin 288.    -2/10/2025 [facility] hematology consult: The patient had clear-cut iron deficiency anemia with a tubular adenoma in April with Dr. Patel and an EGD in January showing some reflux.  Dr. Betts sent him to me as there was no explanation for his iron deficiency but with excellent response to oral iron now with normal hemoglobin and MCV and ferritin and iron and iron saturation.  Almost assuredly has occult blood loss perhaps in the small bowel but I would not put him through a capsule endoscopy at this junction.  The odds of any other cause of anemia with such a robust response to iron supplementation is exceedingly unlikely.  He will take ferrous every other day with vitamin C and we discussed that in detail today and I will repeat his blood count and iron panel and ferritin prior to return in about 4 months along with his hepatitis C PCR history of hepatitis C and a CMP.  Assuming labs look good then then he will follow-up with Dr. Raymundo for routine health maintenance and CBCs per routine and I would just keep him on the oral iron indefinitely given the occult nature of the iron deficiency.  If oral iron becomes ineffective, I will get him back to Dr. Betts for colonoscopy in light of the tubular adenoma history for repeat colonoscopy and also to assess for celiac disease but I am very doubtful of malabsorption given the excellent response to oral iron thus far.  As for the SPEP and UPEP which were not drawn when he saw Dr. Betts, his CMP was unremarkable with normal total protein, albumin, globulin, ratios thereof as well as alkaline phosphatase and calcium and creatinine in the odds for monoclonal gammopathy related anemia is very low.  Given that he had microcytic anemia with iron deficient indices that have responded to oral iron I do not think we need to look for other explanations as to the anemia.  Most people in this setting have  "occult loss in the small bowel that, as long as he is feeling fit and given the low frequency of small bowel malignancy, does not necessarily warrant further attention provided that we can fill the proverbial dike with iron faster than the proverbial dam is leaking.      No history exists.       HISTORY OF PRESENT ILLNESS:  The patient is a 70 y.o. male, here for follow up on management of iron deficiency anemia feeling great    Past Medical History:   Diagnosis Date   • Abnormal EKG    • Acute bacterial prostatitis    • Arrhythmia    • Benign prostatic hypertrophy    • ED (erectile dysfunction) of non-organic origin    • Esophageal reflux    • Hepatitis C    • Hyperlipidemia    • Hypertension    • Labyrinthitis    • Retinal detachment    • Retinal detachment    • Vertigo      Past Surgical History:   Procedure Laterality Date   • CATARACT EXTRACTION Left    • COLONOSCOPY     • HERNIA REPAIR         No Known Allergies    Family History and Social History reviewed and changed as necessary    REVIEW OF SYSTEM:   No new somatic complaints other than occasional odd chest pain as described nonexertional.    PHYSICAL EXAM:  Pallor.  No respiratory distress.    Vitals:    06/09/25 1136   BP: 111/70   Pulse: 74   Resp: 18   Temp: 98.3 °F (36.8 °C)   SpO2: 97%   Weight: 82.1 kg (181 lb)   Height: 175.3 cm (69\")     Vitals:    06/09/25 1136   PainSc: 0-No pain          ECOG score: 1           Vitals reviewed.    ECOG: (0) Fully Active - Able to Carry On All Pre-disease Performance Without Restriction    Lab Results   Component Value Date    HGB 14.1 06/03/2025    HCT 43.3 06/03/2025    MCV 93.3 06/03/2025     06/03/2025    WBC 5.10 06/03/2025    NEUTROABS 1.86 06/03/2025    LYMPHSABS 2.32 06/03/2025    MONOSABS 0.69 06/03/2025    EOSABS 0.15 06/03/2025    BASOSABS 0.07 06/03/2025       Lab Results   Component Value Date    GLUCOSE 98 06/03/2025    BUN 11.2 06/03/2025    CREATININE 1.14 06/03/2025     06/03/2025 "    K 4.2 06/03/2025    CL 98 06/03/2025    CO2 28.0 06/03/2025    CALCIUM 9.9 06/03/2025    PROTEINTOT 8.1 06/03/2025    ALBUMIN 4.5 06/03/2025    BILITOT 1.0 06/03/2025    ALKPHOS 79 06/03/2025    AST 21 06/03/2025    ALT 20 06/03/2025             ASSESSMENT & PLAN:  1.  Iron deficiency anemia with reflux on EGD  2.  Hepatitis C  3.  Hyperlipidemia  4.  Hypertension  5.  Tremor     -12/15/2023 hemoglobin 13.5 MCV 88  -4/25/2024 colonoscopy Dr. Patel tubular adenoma descending colon polyp with hyperplastic sigmoid polyp.  -12/19/2024 hemoglobin 7.9 MCV 70.4  -12/20/2024 hemoglobin 7.2 ferritin 30 iron 19 iron saturation 4% transferrin 328 total iron binding capacity 489.  Normal B12.  CT abdomen pelvis showed no acute findings.  No suspicious hepatic lesion.  No bowel obstruction.  -12/23/2024 hemoglobin 8.8 MCV 73.9  -1/8/2025 Dr. Betts EGD duodenal biopsy unremarkable.  Antrum and body of stomach biopsy showing chronic minimal inflammation with mild reactive epithelial changes.  Esophageal distal biopsy shows squamous mucosa with mild to moderate chronic inflammation of the gastric cardia.  Dr. Betts made referral for further evaluation of his anemia including SPEP and UPEP which were not drawn.  And had normal total protein albumin globulin calcium and creatinine on all CMP's of which she has had multiple over the last 3 years.  - 2/3/2025 hemoglobin 12.3 MCV 85 otherwise unremarkable CBC.  Ferritin 58.  Iron 94 saturation normal 22% total iron binding capacity normal 429.  Transferrin 288.     -2/10/2025 [facility] hematology consult: The patient had clear-cut iron deficiency anemia with a tubular adenoma in April with Dr. Patel and an EGD in January showing some reflux.  Dr. Betts sent him to me as there was no explanation for his iron deficiency but with excellent response to oral iron now with normal hemoglobin and MCV and ferritin and iron and iron saturation.  Almost assuredly has occult blood  loss perhaps in the small bowel but I would not put him through a capsule endoscopy at this junction.  The odds of any other cause of anemia with such a robust response to iron supplementation is exceedingly unlikely.  He will take ferrous every other day with vitamin C and we discussed that in detail today and I will repeat his blood count and iron panel and ferritin prior to return in about 4 months along with his hepatitis C PCR history of hepatitis C and a CMP.  Assuming labs look good then then he will follow-up with Dr. Raymundo for routine health maintenance and CBCs per routine and I would just keep him on the oral iron indefinitely given the occult nature of the iron deficiency.  If oral iron becomes ineffective, I will get him back to Dr. Betts for colonoscopy in light of the tubular adenoma history for repeat colonoscopy and also to assess for celiac disease but I am very doubtful of malabsorption given the excellent response to oral iron thus far.  As for the SPEP and UPEP which were not drawn when he saw Dr. Betts, his CMP was unremarkable with normal total protein, albumin, globulin, ratios thereof as well as alkaline phosphatase and calcium and creatinine in the odds for monoclonal gammopathy related anemia is very low.  Given that he had microcytic anemia with iron deficient indices that have responded to oral iron I do not think we need to look for other explanations as to the anemia.  Most people in this setting have occult loss in the small bowel that, as long as he is feeling fit and given the low frequency of small bowel malignancy, does not necessarily warrant further attention provided that we can fill the proverbial dike with iron faster than the proverbial dam is leaking.    - 6/9/2025 [facility] hematology follow-up: 6/3/2025 hemoglobin now 14.1 with MCV now 93.3 with normal ferritin 40 and normal iron 85 with normal saturation 21%.  He will continue ferrous sulfate every other day with  vitamin C to improve absorption and follow-up with Dr. Betts and primary care.  No need for follow-up with hematology.  Having some occasional weird chest pain not exertional but I asked him to mention to Dr. Raymundo to get worked up further perhaps with a stress test but I will defer to him.  Unlikely to be esophageal.    Time of care today inclusive time reviewing interval data and during visit translating to patient and after visit communicating to his other physicians took 35 minutes patient care time throughout the day today.  Porfirio Freire MD    06/09/2025

## 2025-06-19 ENCOUNTER — OFFICE VISIT (OUTPATIENT)
Dept: FAMILY MEDICINE CLINIC | Facility: CLINIC | Age: 70
End: 2025-06-19
Payer: MEDICARE

## 2025-06-19 ENCOUNTER — LAB (OUTPATIENT)
Dept: LAB | Facility: HOSPITAL | Age: 70
End: 2025-06-19
Payer: MEDICARE

## 2025-06-19 VITALS
HEART RATE: 76 BPM | HEIGHT: 68 IN | SYSTOLIC BLOOD PRESSURE: 104 MMHG | BODY MASS INDEX: 27.34 KG/M2 | WEIGHT: 180.4 LBS | DIASTOLIC BLOOD PRESSURE: 78 MMHG | TEMPERATURE: 98.7 F | OXYGEN SATURATION: 97 %

## 2025-06-19 DIAGNOSIS — R39.12 WEAK URINARY STREAM: Primary | ICD-10-CM

## 2025-06-19 DIAGNOSIS — R39.12 WEAK URINARY STREAM: ICD-10-CM

## 2025-06-19 DIAGNOSIS — M25.512 CHRONIC LEFT SHOULDER PAIN: ICD-10-CM

## 2025-06-19 DIAGNOSIS — G89.29 CHRONIC LEFT SHOULDER PAIN: ICD-10-CM

## 2025-06-19 DIAGNOSIS — Z23 IMMUNIZATION DUE: ICD-10-CM

## 2025-06-19 LAB
BACTERIA UR QL AUTO: NORMAL /HPF
BILIRUB UR QL STRIP: NEGATIVE
CLARITY UR: CLEAR
COLOR UR: YELLOW
GLUCOSE UR STRIP-MCNC: NEGATIVE MG/DL
HGB UR QL STRIP.AUTO: NEGATIVE
HYALINE CASTS UR QL AUTO: NORMAL /LPF
KETONES UR QL STRIP: ABNORMAL
LEUKOCYTE ESTERASE UR QL STRIP.AUTO: NEGATIVE
NITRITE UR QL STRIP: NEGATIVE
PH UR STRIP.AUTO: 5.5 [PH] (ref 5–8)
PROT UR QL STRIP: NEGATIVE
RBC # UR STRIP: NORMAL /HPF
REF LAB TEST METHOD: NORMAL
SP GR UR STRIP: 1.03 (ref 1–1.03)
SQUAMOUS #/AREA URNS HPF: NORMAL /HPF
UROBILINOGEN UR QL STRIP: ABNORMAL
WBC # UR STRIP: NORMAL /HPF

## 2025-06-19 PROCEDURE — 36415 COLL VENOUS BLD VENIPUNCTURE: CPT

## 2025-06-19 PROCEDURE — 84153 ASSAY OF PSA TOTAL: CPT

## 2025-06-19 PROCEDURE — 81001 URINALYSIS AUTO W/SCOPE: CPT

## 2025-06-19 NOTE — PROGRESS NOTES
Follow Up Office Visit      Patient Name: Mp Wen  : 1955   MRN: [id number]     Chief Complaint:    Chief Complaint   Patient presents with    Chest Pain     Left side    Prostate Check     PT is concerned he may have some prostate issues.       History of Present Illness: Mp Wen is a 70 y.o. male who is here today to follow up with chest pain, anemia, and prostate issues.     He saw Dr. Freire and they addressed the low blood count. Had egd that showed acid reflux.     He says he has left shoulder pain. He tried to do some exercises. Exercise made the shoulder pain worse. He spoke to cardiology about it.     He also has been having discharge from penis with sneezing. It was not urine.  It wasn't painful. Had it one time. No pain when he pees. No urinary frequency but has a weak stream.     He also gets discharge in his left eye. Sometimes he has crustiness around his left eye. He uses eye drops - sustain. Has been using 2-3 years. Uses every day. 1-2x per day. No itchiness. Doesn't have crustiness everyday.     Physical exam: Breast exam performed and did not feel any lumps.  Patient had a positive speeds test and an empty can test with pain and weakness.  Patient prostate exam did not show any nodules.      Subjective        I have reviewed and the following portions of the patient's history were updated as appropriate: past family history, past medical history, past social history, past surgical history and problem list.    Medications:     Current Outpatient Medications:     aspirin 81 MG tablet, Take 1 tablet by mouth Daily., Disp: , Rfl:     atorvastatin (LIPITOR) 40 MG tablet, Take 1 tablet by mouth Every Night., Disp: 90 tablet, Rfl: 3    cholecalciferol (VITAMIN D3) 25 MCG (1000 UT) tablet, Take 1 tablet by mouth Daily., Disp: , Rfl:     esomeprazole (nexIUM) 40 MG capsule, Take 1 capsule by mouth Daily., Disp: 30 capsule, Rfl: 5    ferrous gluconate (FERGON) 324 MG tablet, Take 1  "tablet by mouth Daily With Breakfast., Disp: 90 tablet, Rfl: 0    lisinopril (PRINIVIL,ZESTRIL) 5 MG tablet, Take 1 tablet by mouth Daily., Disp: 90 tablet, Rfl: 3    tadalafil (Cialis) 10 MG tablet, Take 1 tablet by mouth Daily As Needed for Erectile Dysfunction., Disp: 30 tablet, Rfl: 2    Allergies:   No Known Allergies    Objective     Physical Exam: Please see above  Vital Signs:   Vitals:    06/19/25 1156   BP: 104/78   Pulse: 76   Temp: 98.7 °F (37.1 °C)   TempSrc: Infrared   SpO2: 97%   Weight: 81.8 kg (180 lb 6.4 oz)   Height: 172.7 cm (68\")     Body mass index is 27.43 kg/m².          Assessment / Plan      Assessment/Plan:   Diagnoses and all orders for this visit:    1. Weak urinary stream (Primary)  -     Urinalysis With Microscopic - Urine, Clean Catch; Future  -     PSA DIAGNOSTIC ONLY; Future    2. Chronic left shoulder pain  -     Ambulatory Referral to Physical Therapy for Evaluation & Treatment    3. Immunization due  -     Hepatitis B Vaccine Adult IM (ENGERIX/RECOMBIVAX)  -     Hepatitis B Vaccine Adult IM (ENGERIX/RECOMBIVAX); Future    Patient does have weak urine stream and reports discharge from his penis after sneezing.  This was likely just prostate fluid.  He has not had this issue before or after.  We did a prostate exam which was fairly normal.  Patient will have a PSA and urinalysis done today.  If things worsened or are recurrent-we can consider further workup and treatment    Patient's left shoulder pain and chest pain seems to be pectoral tendinitis and strain or biceps tendinitis.  He has a little bit of rotator cuff weakness.  Sent patient to physical therapy.  Appreciate their recommendations.  Consider alternative diagnoses and further workup if needed      Vaccines updated as above    Follow Up:   Return for Medicare Wellness, Labs today.    Howie Raymundo, DO  [facility]   "

## 2025-06-19 NOTE — LETTER
[facility]  Vaccine Consent Form    Patient Name:  Mp Wen  Patient :  1955     Vaccine(s) Ordered    Hepatitis B Vaccine Adult IM (ENGERIX/RECOMBIVAX)  Hepatitis B Vaccine Adult IM (ENGERIX/RECOMBIVAX)        Screening Checklist  The following questions should be completed prior to vaccination. If you answer “yes” to any question, it does not necessarily mean you should not be vaccinated. It just means we may need to clarify or ask more questions. If a question is unclear, please ask your healthcare provider to explain it.    Yes No   Any fever or moderate to severe illness today (mild illness and/or antibiotic treatment are not contraindications)?     Do you have a history of a serious reaction to any previous vaccinations, such as anaphylaxis, encephalopathy within 7 days, Guillain-Glendale syndrome within 6 weeks, seizure?     Have you received any live vaccine(s) (e.g MMR, PK) or any other vaccines in the last month (to ensure duplicate doses aren't given)?     Do you have an anaphylactic allergy to latex (DTaP, DTaP-IPV, Hep A, Hep B, MenB, RV, Td, Tdap), baker’s yeast (Hep B, HPV), polysorbates (RSV, nirsevimab, PCV 20 and 21, Rotavirrus, Tdap, Shingrix), or gelatin (PK, MMR)?     Do you have an anaphylactic allergy to neomycin (Rabies, PK, MMR, IPV, Hep A), polymyxin B (IPV), or streptomycin (IPV)?      Any cancer, leukemia, AIDS, or other immune system disorder? (PK, MMR, RV)     Do you have a parent, brother, or sister with an immune system problem (if immune competence of vaccine recipient clinically verified, can proceed)? (MMR, PK)     Any recent steroid treatments for >2 weeks, chemotherapy, or radiation treatment? (PK, MMR)     Have you received antibody-containing blood transfusions or IVIG in the past 11 months (recommended interval is dependent on product)? (MMR, PK)     Have you taken antiviral drugs (acyclovir, famciclovir, valacyclovir for PK) in the last 24 or 48 hours,  "respectively?      Are you pregnant or planning to become pregnant within 1 month? (PK, MMR, HPV, IPV, MenB, Abrexvy; For Hep B- refer to Engerix-B; For RSV - Abrysvo is indicated for 32-36 weeks of pregnancy from September to January)     For infants, have you ever been told your child has had intussusception or a medical emergency involving obstruction of the intestine (Rotavirus)? If not for an infant, can skip this question.         *Ordering Physicians/APC should be consulted if \"yes\" is checked by the patient or guardian above.  I have received, read, and understand the Vaccine Information Statement (VIS) for each vaccine ordered.  I have considered my or my child's health status as well as the health status of my close contacts.  I have taken the opportunity to discuss my vaccine questions with my or my child's health care provider.   I have requested that the ordered vaccine(s) be given to me or my child.  I understand the benefits and risks of the vaccines.  I understand that I should remain in the clinic for 15 minutes after receiving the vaccine(s).  _________________________________________________________  Signature of Patient or Parent/Legal Guardian ____________________  Date     "

## 2025-06-20 ENCOUNTER — RESULTS FOLLOW-UP (OUTPATIENT)
Dept: LAB | Facility: HOSPITAL | Age: 70
End: 2025-06-20
Payer: MEDICARE

## 2025-06-20 LAB — PSA SERPL-MCNC: 0.48 NG/ML (ref 0–4)

## 2025-06-26 ENCOUNTER — TREATMENT (OUTPATIENT)
Dept: PHYSICAL THERAPY | Facility: CLINIC | Age: 70
End: 2025-06-26
Payer: MEDICARE

## 2025-06-26 DIAGNOSIS — M25.612 IMPAIRED RANGE OF MOTION OF BOTH SHOULDERS: ICD-10-CM

## 2025-06-26 DIAGNOSIS — M25.511 ACUTE PAIN OF BOTH SHOULDERS: Primary | ICD-10-CM

## 2025-06-26 DIAGNOSIS — M25.512 ACUTE PAIN OF BOTH SHOULDERS: Primary | ICD-10-CM

## 2025-06-26 DIAGNOSIS — R29.898 WEAKNESS OF BOTH SHOULDERS: ICD-10-CM

## 2025-06-26 DIAGNOSIS — M25.611 IMPAIRED RANGE OF MOTION OF BOTH SHOULDERS: ICD-10-CM

## 2025-06-26 PROCEDURE — 97110 THERAPEUTIC EXERCISES: CPT | Performed by: PHYSICAL THERAPIST

## 2025-06-26 PROCEDURE — 97535 SELF CARE MNGMENT TRAINING: CPT | Performed by: PHYSICAL THERAPIST

## 2025-06-26 PROCEDURE — 97162 PT EVAL MOD COMPLEX 30 MIN: CPT | Performed by: PHYSICAL THERAPIST

## 2025-06-26 NOTE — PROGRESS NOTES
Physical Therapy Initial Evaluation and Plan of Care  [facility] [place]  [address] [address]        Patient: Mp Wen   : 1955  Diagnosis/ICD-10 Code:  No primary diagnosis found.  Referring practitioner: Howie Raymundo DO  Date of Initial Visit: 2025  Today's Date: 2025  Patient seen for Visit count could not be calculated. Make sure you are using a visit which is associated with an episode. sessions         Visit Diagnoses:  No diagnosis found.      Subjective Questionnaire: QuickDASH: 43    Subjective Evaluation    History of Present Illness  Mechanism of injury: The pt reported a few months of L shoulder pain that began with no apparent cause. Pain is felt lateral and deep into the armpit. Pain is worsened with reaching across his body, reaching overhead, and reaching behind his back. He has not been able to tolerate sleeping on his L side, which is his normal sleeping position, and feels better switching position. Pain is generally worse at night. He enjoys fishing and has had to move the reel from the left to the right due to pain reeling on the L. Pain is improved with rest and change in position.     He has recently developed R shoulder pain as well. It is primarily superolateral and extends into the neck.       Patient Occupation: Works as a  - off for the summer Pain  Current pain ratin  At best pain ratin  At worst pain ratin  Location: L shoulder  Quality: dull ache  Relieving factors: rest  Aggravating factors: overhead activity, movement, lifting, outstretched reach and repetitive movement  Progression: worsening    Social Support  Lives with: spouse    Hand dominance: right    Diagnostic Tests  No diagnostic tests performed    Treatments  No previous or current treatments  Patient Goals  Patient goals for therapy: decreased pain, increased motion, independence with ADLs/IADLs, return to sport/leisure  activities, increased strength and return to work             Objective          Tenderness     Left Shoulder   Tenderness in the infraspinatus tendon, subscapularis tendon and supraspinatus tendon. No tenderness in the biceps tendon (proximal).     Right Shoulder  Tenderness in the infraspinatus tendon and supraspinatus tendon. No tenderness in the biceps tendon (proximal) and subscapularis tendon.     Active Range of Motion   Left Shoulder   Flexion: 99 degrees with pain  Abduction: 90 degrees with pain  External rotation 0°: 30 degrees with pain  Internal rotation BTB: L5 with pain    Right Shoulder   Flexion: 140 degrees with pain  Abduction: 140 degrees with pain  External rotation 0°: 70 degrees with pain  Internal rotation BTB: T7     Strength/Myotome Testing     Left Shoulder     Planes of Motion   Flexion: 4-   Abduction: 4   External rotation at 0°: 3+   Internal rotation at 0°: 4-     Right Shoulder     Planes of Motion   Flexion: 4-   Abduction: 4+   External rotation at 0°: 4-   Internal rotation at 0°: 4+     Tests     Left Shoulder   Positive full can, Hawkin's, lift-off, Neer's and painful arc.   Negative empty can and Speed's.     Right Shoulder   Positive empty can, full can, Hawkin's and Neer's.   Negative lift-off, painful arc and Speed's.           Assessment & Plan       Assessment  Impairments: abnormal muscle firing, abnormal or restricted ROM, impaired physical strength, lacks appropriate home exercise program and pain with function   Functional limitations: carrying objects, lifting, reaching behind back, reaching overhead and unable to perform repetitive tasks   Assessment details: The patient is a 69 yo male who presented to PT with evolving characteristics of B shoulder pain, ROM limitations, and weakness with moderate complexity. Signs and symptoms are consistent with rotator cuff injury, with particular concern for the supraspinatus on the right and the subscapularis on the left.   Right sided symptoms are more acute and are likely inflammatory in nature.  I anticipate the right sided issue will calm down relatively quickly with low-level exercise and strategies for activity modification and inflammation management.  The left side is a bit more chronic and mobility deficits are significantly worse in each plane.  He had general irritability with movement of the left shoulder as well as relatively global tenderness to touch in the rotator cuff tendons.  He was prescribed an HEP for scapular retraction, closed kinetic chain active range of motion, and light cuff isometrics.  He was instructed to let pain be his guide with exercise and not to push through sharp or severe pain. I expect the patient to make a timely recovery with skilled PT intervention.     Prognosis: good    Goals  Plan Goals: Short Term Goals (4 weeks):     1. The patient will be independent and compliant with initial HEP.     2. The patient will report pain at rest 3/10 or less and worst pain 5/10 or less.    3. The patient will display decreased TTP in the R SS tendon, L subscap tendon and dec mm tension in the surrounding musculature.    4.  L shoulder AROM will improve to flex 140 deg, abd 140 deg, ER 50 deg, IR T12 deg.    5. The patient will demonstrate inc strength evidenced by MMT as follows: flex 4+/5, abd 4+/5, ER 4+/5, and IR 4+/5.    6. Quick DASH will improve by 11 points or more.         Long Term Goals (8 weeks):     1. The patient will be appropriate for independent management and compliant with progressed HEP.     2. The patient will report pain at rest 1/10 or less and worst pain 3/10 or less.    3. L shoulder AROM will be within 5 degrees of the contralateral side in flex, abd, ER, and IR.    4. The patient will return to work duties and/or ADLs with no limitations due to shoulder pain or dysfunction.    5. The patient will return to recreational and community activities with no limitations due to shoulder  pain or dysfunction.      Plan  Therapy options: will be seen for skilled therapy services  Planned modality interventions: cryotherapy, iontophoresis, TENS, electrical stimulation/Russian stimulation and thermotherapy (hydrocollator packs)  Planned therapy interventions: ADL retraining, body mechanics training, flexibility, functional ROM exercises, home exercise program, joint mobilization, manual therapy, neuromuscular re-education, postural training, soft tissue mobilization, strengthening, therapeutic activities and stretching  Frequency: 1x week  Duration in visits: 8  Duration in weeks: 8  Treatment plan discussed with: patient  Plan details: The patient will likely benefit from TE/TA/NMED to improve RC strength, UE proprioception, and scapular mobility. MT will be utilized in addition to stretching for improved GH jt mobility and AROM. Modalities will be used as needed for pain modulation and reduction of swelling. Dry needling as indicated.         History # of Personal Factors and/or Comorbidities: MODERATE (1-2)  Examination of Body System(s): # of elements: MODERATE (3)  Clinical Presentation: EVOLVING  Clinical Decision Making: LOW     Timed:         Manual Therapy:    0     mins  61595;     Therapeutic Exercise:    15     mins  44909;     Neuromuscular Paul:    0    mins  85623;    Therapeutic Activity:     0     mins  52513;     Gait Trainin     mins  12322;     Ultrasound:     0     mins  37478;    Ionto                               0    mins   36500  Self Care                       10     mins   13847      Un-Timed:  Canalith Repos    0     mins 05567  Group Therapy    0     mins 18944  Electrical Stimulation:    0     mins  09081 ( );  Dry Needling     0     mins self-pay  Traction     0     mins 71807  Low Eval     0     Mins  26410  Mod Eval     20     Mins  36548  High Eval                       0     Mins  32240        Timed Treatment:   25   mins   Total Treatment:     45    mins          PT: Balta Powell PT     License Number: [id number]  Electronically signed by Balta Powell PT, 06/26/25, 4:15 PM EDT    Initial Certification  Certification Period: 9/24/2025  I certify that the therapy services are furnished while this patient is under my care.  The services outlined above are required by this patient, and will be reviewed every 90 days.     PHYSICIAN: ________________________________________________________  Howie Raymundo,         DATE: ____________________________________________________________    Please sign and return via fax to [phone]. Thank you, [facility].

## 2025-07-03 ENCOUNTER — TREATMENT (OUTPATIENT)
Dept: PHYSICAL THERAPY | Facility: CLINIC | Age: 70
End: 2025-07-03
Payer: MEDICARE

## 2025-07-03 DIAGNOSIS — R29.898 WEAKNESS OF BOTH SHOULDERS: ICD-10-CM

## 2025-07-03 DIAGNOSIS — M25.512 ACUTE PAIN OF BOTH SHOULDERS: Primary | ICD-10-CM

## 2025-07-03 DIAGNOSIS — M25.511 ACUTE PAIN OF BOTH SHOULDERS: Primary | ICD-10-CM

## 2025-07-03 DIAGNOSIS — M25.611 IMPAIRED RANGE OF MOTION OF BOTH SHOULDERS: ICD-10-CM

## 2025-07-03 DIAGNOSIS — M25.612 IMPAIRED RANGE OF MOTION OF BOTH SHOULDERS: ICD-10-CM

## 2025-07-03 PROCEDURE — 97110 THERAPEUTIC EXERCISES: CPT | Performed by: PHYSICAL THERAPIST

## 2025-07-03 PROCEDURE — 97140 MANUAL THERAPY 1/> REGIONS: CPT | Performed by: PHYSICAL THERAPIST

## 2025-07-07 ENCOUNTER — TELEPHONE (OUTPATIENT)
Dept: FAMILY MEDICINE CLINIC | Facility: CLINIC | Age: 70
End: 2025-07-07
Payer: MEDICARE

## 2025-07-07 RX ORDER — FERROUS GLUCONATE 324(38)MG
324 TABLET ORAL
Qty: 90 TABLET | Refills: 0 | Status: SHIPPED | OUTPATIENT
Start: 2025-07-07

## 2025-07-07 NOTE — TELEPHONE ENCOUNTER
Caller: Mp Wen    Relationship: Self    Best call back number: [phone]     Requested Prescriptions:   IRON PILL - PATIENT DIDN'T KNOW THE NAME OF THIS MEDICATION

## 2025-07-09 ENCOUNTER — TREATMENT (OUTPATIENT)
Dept: PHYSICAL THERAPY | Facility: CLINIC | Age: 70
End: 2025-07-09
Payer: MEDICARE

## 2025-07-09 DIAGNOSIS — M25.511 ACUTE PAIN OF BOTH SHOULDERS: Primary | ICD-10-CM

## 2025-07-09 DIAGNOSIS — M25.512 ACUTE PAIN OF BOTH SHOULDERS: Primary | ICD-10-CM

## 2025-07-09 DIAGNOSIS — R29.898 WEAKNESS OF BOTH SHOULDERS: ICD-10-CM

## 2025-07-09 DIAGNOSIS — M25.612 IMPAIRED RANGE OF MOTION OF BOTH SHOULDERS: ICD-10-CM

## 2025-07-09 DIAGNOSIS — M25.611 IMPAIRED RANGE OF MOTION OF BOTH SHOULDERS: ICD-10-CM

## 2025-07-09 PROCEDURE — 97110 THERAPEUTIC EXERCISES: CPT | Performed by: PHYSICAL THERAPIST

## 2025-07-09 PROCEDURE — 97112 NEUROMUSCULAR REEDUCATION: CPT | Performed by: PHYSICAL THERAPIST

## 2025-07-09 PROCEDURE — 97140 MANUAL THERAPY 1/> REGIONS: CPT | Performed by: PHYSICAL THERAPIST

## 2025-07-09 NOTE — PROGRESS NOTES
Physical Therapy Daily Treatment Note  [facility] [place]  [address], [address]  [place] [address]      Patient: Mp Wen   : 1955  Referring practitioner: Howie Raymundo DO  Date of Initial Visit: Type: THERAPY  Noted: 2025  Today's Date: 2025  Patient seen for 3 sessions       Visit Diagnoses:    ICD-10-CM ICD-9-CM   1. Acute pain of both shoulders  M25.511 719.41    M25.512    2. Impaired range of motion of both shoulders  M25.611 719.51    M25.612    3. Weakness of both shoulders  R29.898 781.99       Subjective Evaluation    History of Present Illness  Mechanism of injury: The patient stated that he has been consistent with his home program and has been pushing through discomfort to raise his arm higher.    Pain  Current pain ratin  Location: Bilateral shoulders           Objective          Active Range of Motion   Left Shoulder   Flexion: 117 degrees   Abduction: 113 degrees   External rotation 0°: 50 degrees   Internal rotation BTB: L1     Right Shoulder   Flexion: 143 degrees   Abduction: 152 degrees   External rotation 0°: 73 degrees   Internal rotation BTB: T9       See Exercise, Manual, and Modality Logs for complete treatment.       Assessment & Plan       Assessment  Assessment details: The patient demonstrated improved active range of motion of both shoulders today as compared to his initial evaluation and has responded well to his initial home program.  He has been pushing through pain to perform exercises and was instructed to avoid this so as not to increase inflammation or build pain related compensatory habits.  Inferior gliding of the glenohumeral joint via active cuff contraction was emphasized today was tolerated well, though fatigue led to loss of control.  Wall slides were transition to table slides on his home program this week and he was instructed to focus on shoulder depression.    Plan  Plan details: Continue active inferior gliding, manual therapy,  and light cuff strengthening.          Timed:         Manual Therapy:    25     mins  53185;     Therapeutic Exercise:    20     mins  49003;     Neuromuscular Paul:    15    mins  43097;    Therapeutic Activity:     0     mins  37139;     Gait Trainin     mins  41834;     Ultrasound:     0     mins  32769;    Ionto                               0    mins   00050  Self Care                       0     mins   87328      Un-Timed:  Canalith Repos    0     mins 67057  Group Therapy    0     mins 13440  Electrical Stimulation:    0     mins  70522 ( );  Dry Needling     0     mins self-pay  Traction     0     mins 03504      Timed Treatment:   60   mins   Total Treatment:     60   mins    Balta Powell, PT  [place] License: [id number]

## 2025-07-12 DIAGNOSIS — K21.00 GASTROESOPHAGEAL REFLUX DISEASE WITH ESOPHAGITIS WITHOUT HEMORRHAGE: ICD-10-CM

## 2025-07-14 RX ORDER — ESOMEPRAZOLE MAGNESIUM 40 MG/1
40 CAPSULE, DELAYED RELEASE ORAL DAILY
Qty: 90 CAPSULE | Refills: 1 | Status: SHIPPED | OUTPATIENT
Start: 2025-07-14

## 2025-07-14 NOTE — TELEPHONE ENCOUNTER
Rx Refill Note  Requested Prescriptions     Pending Prescriptions Disp Refills    esomeprazole (nexIUM) 40 MG capsule [Pharmacy Med Name: ESOMEPRAZOLE MAG DR 40 MG CAP] 90 capsule 1     Sig: TAKE 1 CAPSULE BY MOUTH EVERY DAY      Last office visit with prescribing clinician: Visit date not found   Last telemedicine visit with prescribing clinician: Visit date not found   Next office visit with prescribing clinician: Visit date not found                           Juju Maxwell MA  07/14/25, 08:35 EDT

## 2025-07-15 ENCOUNTER — OFFICE VISIT (OUTPATIENT)
Dept: NEUROLOGY | Facility: CLINIC | Age: 70
End: 2025-07-15
Payer: MEDICARE

## 2025-07-15 VITALS
HEART RATE: 87 BPM | OXYGEN SATURATION: 96 % | HEIGHT: 68 IN | DIASTOLIC BLOOD PRESSURE: 80 MMHG | SYSTOLIC BLOOD PRESSURE: 118 MMHG | BODY MASS INDEX: 27.19 KG/M2 | WEIGHT: 179.4 LBS

## 2025-07-15 DIAGNOSIS — R25.1 TREMOR: Primary | ICD-10-CM

## 2025-07-15 PROCEDURE — 3074F SYST BP LT 130 MM HG: CPT | Performed by: PSYCHIATRY & NEUROLOGY

## 2025-07-15 PROCEDURE — 99204 OFFICE O/P NEW MOD 45 MIN: CPT | Performed by: PSYCHIATRY & NEUROLOGY

## 2025-07-15 PROCEDURE — 3079F DIAST BP 80-89 MM HG: CPT | Performed by: PSYCHIATRY & NEUROLOGY

## 2025-07-15 PROCEDURE — 1159F MED LIST DOCD IN RCRD: CPT | Performed by: PSYCHIATRY & NEUROLOGY

## 2025-07-15 PROCEDURE — 1160F RVW MEDS BY RX/DR IN RCRD: CPT | Performed by: PSYCHIATRY & NEUROLOGY

## 2025-07-15 RX ORDER — NEOMYCIN SULFATE, POLYMYXIN B SULFATE AND DEXAMETHASONE 3.5; 10000; 1 MG/ML; [USP'U]/ML; MG/ML
SUSPENSION/ DROPS OPHTHALMIC
COMMUNITY
Start: 2025-07-11

## 2025-07-15 RX ORDER — PRIMIDONE 50 MG/1
TABLET ORAL
Qty: 30 TABLET | Refills: 5 | Status: SHIPPED | OUTPATIENT
Start: 2025-07-15

## 2025-07-15 NOTE — PROGRESS NOTES
Subjective:    CC: Mp Wen is seen today in consultation at the request of Howie Raymundo DO for new patient (tremor)       HPI:  70-year-old -American male with a history of hypertension, BPH, hepatitis C in remission presents with tremors.  As per patient he has had tremors in his hands for a really long time but in the past few years they have worsened in severity and intensity.  He mainly has them while carrying out tasks such as holding a cup or while writing.  He does have some balance issues which he attributes to having vertigo.  He denies having any family history of tremors.  Also denies heavy alcohol intake.  He had blood work that showed a B12 level of 656 with normal TSH and LDL of 70.  He did have severe fatigue a few months ago secondary to anemia for which he received a blood transfusion.  Subsequent workup was unremarkable.  He has been taking iron supplements along with vitamin C.  Mainly    The following portions of the patient's history were reviewed today and updated as of 07/15/2025  : allergies, current medications, past family history, past medical history, past social history, past surgical history, and problem list  These document will be scanned to patient's chart.      Current Outpatient Medications:     aspirin 81 MG tablet, Take 1 tablet by mouth Daily., Disp: , Rfl:     atorvastatin (LIPITOR) 40 MG tablet, Take 1 tablet by mouth Every Night., Disp: 90 tablet, Rfl: 3    cholecalciferol (VITAMIN D3) 25 MCG (1000 UT) tablet, Take 1 tablet by mouth Daily., Disp: , Rfl:     esomeprazole (nexIUM) 40 MG capsule, TAKE 1 CAPSULE BY MOUTH EVERY DAY, Disp: 90 capsule, Rfl: 1    ferrous gluconate (FERGON) 324 MG tablet, Take 1 tablet by mouth Daily With Breakfast., Disp: 90 tablet, Rfl: 0    lisinopril (PRINIVIL,ZESTRIL) 5 MG tablet, Take 1 tablet by mouth Daily., Disp: 90 tablet, Rfl: 3    neomycin-polymyxin-dexamethasone (MAXITROL) 3.5-20876-8.1 ophthalmic suspension, INSTILL 1 DROP  IN RIGHT EYE THREE TIMES A DAY FOR 1 WEEK, Disp: , Rfl:     tadalafil (Cialis) 10 MG tablet, Take 1 tablet by mouth Daily As Needed for Erectile Dysfunction., Disp: 30 tablet, Rfl: 2    primidone (MYSOLINE) 50 MG tablet, Take half a tablet at night for 1 week then 1 tablet at night, Disp: 30 tablet, Rfl: 5   Past Medical History:   Diagnosis Date    Abnormal EKG     Acute bacterial prostatitis     Arrhythmia     Arthritis     Benign prostatic hypertrophy     Cataract     ED (erectile dysfunction) of non-organic origin     Erectile dysfunction     Esophageal reflux     Hepatitis C     Hyperlipidemia     Hypertension     Injury of back 2019    Injury of neck 2019    Labyrinthitis     Retinal detachment     Retinal detachment     Tremor     Vertigo     Visual impairment     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      Past Surgical History:   Procedure Laterality Date    CATARACT EXTRACTION Left     COLONOSCOPY      EYE SURGERY      Refer to records    HERNIA REPAIR      INGUINAL HERNIA REPAIR      2023      Family History   Problem Relation Age of Onset    Coronary artery disease Mother     Heart attack Mother              Prostate cancer Father     Hypertension Sister     Hypertension Sister     Hypertension Brother     Hyperlipidemia Brother     Hypertension Brother     No Known Problems Paternal Grandmother       Social History     Socioeconomic History    Marital status:    Tobacco Use    Smoking status: Former     Current packs/day: 0.00     Average packs/day: 0.5 packs/day for 28.0 years (14.0 ttl pk-yrs)     Types: Cigarettes     Start date: 1968     Quit date: 1996     Years since quittin.2     Passive exposure: Past    Smokeless tobacco: Never   Vaping Use    Vaping status: Never Used   Substance and Sexual Activity    Alcohol use: Not Currently    Drug use: Never    Sexual activity: Yes     Partners: Female     Birth control/protection: None     Review of Systems   Neurological:   "Positive for tremors.   All other systems reviewed and are negative.      Objective:    /80   Pulse 87   Ht 172.7 cm (68\")   Wt 81.4 kg (179 lb 6.4 oz)   SpO2 96%   BMI 27.28 kg/m²     Neurology Exam:    General apperance: NAD.     Mental status: Alert, awake and oriented to time place and person.    Recent and Remote memory: Intact.    Attention span and Concentration: Normal.     Language and Speech: Intact- No dysarthria.    Fluency, Naming , Repitition and Comprehension:  Intact    Cranial Nerves:   CN II: Visual fields are full. Intact. Fundi - Normal, No papillederma, Pupils - CHUNG  CN III, IV and VI: Extraocular movements are intact. Normal saccades.   CN V: Facial sensation is intact.   CN VII: Muscles of facial expression reveal no asymmetry. Intact.   CN VIII: Hearing is intact. Whispered voice intact.   CN IX and X: Palate elevates symmetrically. Intact  CN XI: Shoulder shrug is intact.   CN XII: Tongue is midline without evidence of atrophy or fasciculation.     Ophthalmoscopic exam of optic disc-normal    Motor: Mild postural and kinetic tremor noted in both hands.  He did have a mild tremor while driving or Wharton's wheel and while writing.  No micrographia noted.    Right UE muscle strength 5/5. Normal tone.     Left UE muscle strength 5/5. Normal tone.      Right LE muscle strength5/5. Normal tone.     Left LE muscle strength 5/5. Normal tone.      Sensory: Normal light touch, vibration and pinprick sensation bilaterally.    DTRs: 2+ bilaterally in upper and lower extremities.    Babinski: Negative bilaterally.    Co-ordination: Normal finger-to-nose, heel to shin B/L.    Rhomberg: Negative.    Gait: Normal.  Good armswing bilaterally    Cardiovascular: Regular rate and rhythm without murmur, gallop or rub.    Assessment and Plan:  1. Tremor  He most likely has an essential tremor.  I do not see features of Parkinson's on examination today  I will start him on primidone gradually increasing " to 50 mg nightly  His B12/TSH level was within normal limits.       Return in about 3 months (around 10/15/2025).     I spent over 40 minutes with the patient face to face out of which over 50% (30 minutes) was spent in management, instructions and education.     Aparna Cain MD

## 2025-08-07 DIAGNOSIS — E78.5 HYPERLIPIDEMIA, UNSPECIFIED HYPERLIPIDEMIA TYPE: ICD-10-CM

## 2025-08-07 RX ORDER — ATORVASTATIN CALCIUM 40 MG/1
40 TABLET, FILM COATED ORAL NIGHTLY
Qty: 90 TABLET | Refills: 0 | Status: SHIPPED | OUTPATIENT
Start: 2025-08-07

## 2025-08-08 ENCOUNTER — TELEPHONE (OUTPATIENT)
Dept: FAMILY MEDICINE CLINIC | Facility: CLINIC | Age: 70
End: 2025-08-08
Payer: MEDICARE

## 2025-08-11 ENCOUNTER — TELEPHONE (OUTPATIENT)
Dept: PHYSICAL THERAPY | Facility: CLINIC | Age: 70
End: 2025-08-11
Payer: MEDICARE